# Patient Record
Sex: FEMALE | Race: WHITE | NOT HISPANIC OR LATINO
[De-identification: names, ages, dates, MRNs, and addresses within clinical notes are randomized per-mention and may not be internally consistent; named-entity substitution may affect disease eponyms.]

---

## 2015-05-01 RX ORDER — GABAPENTIN 400 MG/1
4 CAPSULE ORAL
Qty: 0 | Refills: 0 | DISCHARGE
Start: 2015-05-01

## 2017-01-23 ENCOUNTER — APPOINTMENT (OUTPATIENT)
Dept: FAMILY MEDICINE | Facility: CLINIC | Age: 67
End: 2017-01-23

## 2017-01-23 VITALS
DIASTOLIC BLOOD PRESSURE: 72 MMHG | WEIGHT: 178 LBS | HEART RATE: 66 BPM | HEIGHT: 61 IN | OXYGEN SATURATION: 96 % | SYSTOLIC BLOOD PRESSURE: 140 MMHG | BODY MASS INDEX: 33.61 KG/M2 | RESPIRATION RATE: 14 BRPM | TEMPERATURE: 98 F

## 2017-01-23 DIAGNOSIS — M25.562 PAIN IN LEFT KNEE: ICD-10-CM

## 2017-01-26 ENCOUNTER — NON-APPOINTMENT (OUTPATIENT)
Age: 67
End: 2017-01-26

## 2017-01-26 ENCOUNTER — APPOINTMENT (OUTPATIENT)
Dept: CARDIOLOGY | Facility: CLINIC | Age: 67
End: 2017-01-26

## 2017-01-26 VITALS
HEART RATE: 60 BPM | HEIGHT: 61 IN | OXYGEN SATURATION: 95 % | RESPIRATION RATE: 15 BRPM | WEIGHT: 185 LBS | DIASTOLIC BLOOD PRESSURE: 75 MMHG | SYSTOLIC BLOOD PRESSURE: 130 MMHG | BODY MASS INDEX: 34.93 KG/M2

## 2017-01-26 VITALS — SYSTOLIC BLOOD PRESSURE: 110 MMHG | HEART RATE: 60 BPM | DIASTOLIC BLOOD PRESSURE: 60 MMHG

## 2017-02-02 ENCOUNTER — RX RENEWAL (OUTPATIENT)
Age: 67
End: 2017-02-02

## 2017-02-14 ENCOUNTER — RX RENEWAL (OUTPATIENT)
Age: 67
End: 2017-02-14

## 2017-02-21 ENCOUNTER — MEDICATION RENEWAL (OUTPATIENT)
Age: 67
End: 2017-02-21

## 2017-02-22 ENCOUNTER — MEDICATION RENEWAL (OUTPATIENT)
Age: 67
End: 2017-02-22

## 2017-03-04 ENCOUNTER — RX RENEWAL (OUTPATIENT)
Age: 67
End: 2017-03-04

## 2017-03-08 ENCOUNTER — RX RENEWAL (OUTPATIENT)
Age: 67
End: 2017-03-08

## 2017-03-09 ENCOUNTER — APPOINTMENT (OUTPATIENT)
Dept: OBGYN | Facility: CLINIC | Age: 67
End: 2017-03-09

## 2017-03-09 VITALS
BODY MASS INDEX: 33.99 KG/M2 | RESPIRATION RATE: 16 BRPM | SYSTOLIC BLOOD PRESSURE: 112 MMHG | DIASTOLIC BLOOD PRESSURE: 72 MMHG | WEIGHT: 180 LBS | OXYGEN SATURATION: 96 % | HEIGHT: 61 IN | HEART RATE: 67 BPM

## 2017-03-09 DIAGNOSIS — Z12.31 ENCOUNTER FOR SCREENING MAMMOGRAM FOR MALIGNANT NEOPLASM OF BREAST: ICD-10-CM

## 2017-03-09 DIAGNOSIS — Z01.419 ENCOUNTER FOR GYNECOLOGICAL EXAMINATION (GENERAL) (ROUTINE) W/OUT ABNORMAL FINDINGS: ICD-10-CM

## 2017-03-09 DIAGNOSIS — Z87.891 PERSONAL HISTORY OF NICOTINE DEPENDENCE: ICD-10-CM

## 2017-03-09 DIAGNOSIS — Z87.59 PERSONAL HISTORY OF OTHER COMPLICATIONS OF PREGNANCY, CHILDBIRTH AND THE PUERPERIUM: ICD-10-CM

## 2017-03-09 DIAGNOSIS — Z13.820 ENCOUNTER FOR SCREENING FOR OSTEOPOROSIS: ICD-10-CM

## 2017-03-10 ENCOUNTER — APPOINTMENT (OUTPATIENT)
Dept: ORTHOPEDIC SURGERY | Facility: CLINIC | Age: 67
End: 2017-03-10

## 2017-03-13 LAB — HPV HIGH+LOW RISK DNA PNL CVX: NEGATIVE

## 2017-03-15 LAB — CYTOLOGY CVX/VAG DOC THIN PREP: NORMAL

## 2017-03-20 ENCOUNTER — RX RENEWAL (OUTPATIENT)
Age: 67
End: 2017-03-20

## 2017-03-20 ENCOUNTER — APPOINTMENT (OUTPATIENT)
Dept: ORTHOPEDIC SURGERY | Facility: CLINIC | Age: 67
End: 2017-03-20
Payer: MEDICAID

## 2017-03-20 ENCOUNTER — APPOINTMENT (OUTPATIENT)
Dept: FAMILY MEDICINE | Facility: CLINIC | Age: 67
End: 2017-03-20

## 2017-03-20 VITALS
SYSTOLIC BLOOD PRESSURE: 130 MMHG | TEMPERATURE: 98.3 F | DIASTOLIC BLOOD PRESSURE: 80 MMHG | OXYGEN SATURATION: 94 % | BODY MASS INDEX: 34.95 KG/M2 | HEIGHT: 60 IN | HEART RATE: 77 BPM | WEIGHT: 178 LBS

## 2017-03-20 PROCEDURE — 99214 OFFICE O/P EST MOD 30 MIN: CPT

## 2017-03-22 ENCOUNTER — APPOINTMENT (OUTPATIENT)
Dept: SURGERY | Facility: CLINIC | Age: 67
End: 2017-03-22

## 2017-03-22 VITALS — BODY MASS INDEX: 32.62 KG/M2 | HEIGHT: 61.5 IN | WEIGHT: 175 LBS

## 2017-03-22 DIAGNOSIS — Z86.79 PERSONAL HISTORY OF OTHER DISEASES OF THE CIRCULATORY SYSTEM: ICD-10-CM

## 2017-03-22 DIAGNOSIS — Z87.39 PERSONAL HISTORY OF OTHER DISEASES OF THE MUSCULOSKELETAL SYSTEM AND CONNECTIVE TISSUE: ICD-10-CM

## 2017-03-22 DIAGNOSIS — Z12.11 ENCOUNTER FOR SCREENING FOR MALIGNANT NEOPLASM OF COLON: ICD-10-CM

## 2017-03-27 ENCOUNTER — APPOINTMENT (OUTPATIENT)
Dept: MAMMOGRAPHY | Facility: HOSPITAL | Age: 67
End: 2017-03-27

## 2017-03-27 ENCOUNTER — APPOINTMENT (OUTPATIENT)
Dept: RADIOLOGY | Facility: HOSPITAL | Age: 67
End: 2017-03-27

## 2017-03-27 ENCOUNTER — OUTPATIENT (OUTPATIENT)
Dept: OUTPATIENT SERVICES | Facility: HOSPITAL | Age: 67
LOS: 1 days | End: 2017-03-27
Payer: MEDICAID

## 2017-03-27 DIAGNOSIS — Z96.653 PRESENCE OF ARTIFICIAL KNEE JOINT, BILATERAL: Chronic | ICD-10-CM

## 2017-03-27 DIAGNOSIS — Z98.89 OTHER SPECIFIED POSTPROCEDURAL STATES: Chronic | ICD-10-CM

## 2017-03-27 DIAGNOSIS — Z12.31 ENCOUNTER FOR SCREENING MAMMOGRAM FOR MALIGNANT NEOPLASM OF BREAST: ICD-10-CM

## 2017-03-27 DIAGNOSIS — M85.839 OTHER SPECIFIED DISORDERS OF BONE DENSITY AND STRUCTURE, UNSPECIFIED FOREARM: ICD-10-CM

## 2017-03-27 DIAGNOSIS — Z78.0 ASYMPTOMATIC MENOPAUSAL STATE: ICD-10-CM

## 2017-03-27 DIAGNOSIS — K43.2 INCISIONAL HERNIA WITHOUT OBSTRUCTION OR GANGRENE: Chronic | ICD-10-CM

## 2017-03-27 PROCEDURE — 77067 SCR MAMMO BI INCL CAD: CPT

## 2017-03-27 PROCEDURE — 77080 DXA BONE DENSITY AXIAL: CPT

## 2017-03-27 PROCEDURE — 77063 BREAST TOMOSYNTHESIS BI: CPT

## 2017-03-28 ENCOUNTER — TRANSCRIPTION ENCOUNTER (OUTPATIENT)
Age: 67
End: 2017-03-28

## 2017-03-28 ENCOUNTER — OUTPATIENT (OUTPATIENT)
Dept: OUTPATIENT SERVICES | Facility: HOSPITAL | Age: 67
LOS: 1 days | Discharge: HOME | End: 2017-03-28
Payer: MEDICAID

## 2017-03-28 DIAGNOSIS — Z98.89 OTHER SPECIFIED POSTPROCEDURAL STATES: Chronic | ICD-10-CM

## 2017-03-28 DIAGNOSIS — K43.2 INCISIONAL HERNIA WITHOUT OBSTRUCTION OR GANGRENE: Chronic | ICD-10-CM

## 2017-03-28 DIAGNOSIS — Z96.653 PRESENCE OF ARTIFICIAL KNEE JOINT, BILATERAL: Chronic | ICD-10-CM

## 2017-03-28 DIAGNOSIS — K57.30 DIVERTICULOSIS OF LARGE INTESTINE WITHOUT PERFORATION OR ABSCESS WITHOUT BLEEDING: ICD-10-CM

## 2017-03-28 DIAGNOSIS — Z12.11 ENCOUNTER FOR SCREENING FOR MALIGNANT NEOPLASM OF COLON: ICD-10-CM

## 2017-03-28 DIAGNOSIS — K59.00 CONSTIPATION, UNSPECIFIED: ICD-10-CM

## 2017-03-28 DIAGNOSIS — M10.9 GOUT, UNSPECIFIED: ICD-10-CM

## 2017-03-28 DIAGNOSIS — K62.5 HEMORRHAGE OF ANUS AND RECTUM: ICD-10-CM

## 2017-03-28 DIAGNOSIS — G89.29 OTHER CHRONIC PAIN: ICD-10-CM

## 2017-03-28 PROCEDURE — 45378 DIAGNOSTIC COLONOSCOPY: CPT | Mod: 33

## 2017-03-28 PROCEDURE — G0121: CPT

## 2017-03-31 ENCOUNTER — RX RENEWAL (OUTPATIENT)
Age: 67
End: 2017-03-31

## 2017-04-04 ENCOUNTER — RX RENEWAL (OUTPATIENT)
Age: 67
End: 2017-04-04

## 2017-04-07 ENCOUNTER — APPOINTMENT (OUTPATIENT)
Dept: FAMILY MEDICINE | Facility: CLINIC | Age: 67
End: 2017-04-07

## 2017-04-07 VITALS
HEIGHT: 61 IN | BODY MASS INDEX: 32.85 KG/M2 | DIASTOLIC BLOOD PRESSURE: 70 MMHG | SYSTOLIC BLOOD PRESSURE: 130 MMHG | RESPIRATION RATE: 16 BRPM | HEART RATE: 70 BPM | WEIGHT: 174 LBS | OXYGEN SATURATION: 96 %

## 2017-04-07 DIAGNOSIS — K12.1 OTHER FORMS OF STOMATITIS: ICD-10-CM

## 2017-04-10 ENCOUNTER — RX RENEWAL (OUTPATIENT)
Age: 67
End: 2017-04-10

## 2017-04-28 ENCOUNTER — RX RENEWAL (OUTPATIENT)
Age: 67
End: 2017-04-28

## 2017-05-03 ENCOUNTER — FORM ENCOUNTER (OUTPATIENT)
Age: 67
End: 2017-05-03

## 2017-05-03 ENCOUNTER — APPOINTMENT (OUTPATIENT)
Dept: FAMILY MEDICINE | Facility: CLINIC | Age: 67
End: 2017-05-03

## 2017-05-03 VITALS
TEMPERATURE: 97.9 F | HEART RATE: 52 BPM | BODY MASS INDEX: 32.28 KG/M2 | DIASTOLIC BLOOD PRESSURE: 58 MMHG | HEIGHT: 61 IN | WEIGHT: 171 LBS | SYSTOLIC BLOOD PRESSURE: 110 MMHG | OXYGEN SATURATION: 94 %

## 2017-05-03 VITALS — RESPIRATION RATE: 16 BRPM

## 2017-05-03 DIAGNOSIS — Z78.9 OTHER SPECIFIED HEALTH STATUS: ICD-10-CM

## 2017-05-04 ENCOUNTER — OUTPATIENT (OUTPATIENT)
Dept: OUTPATIENT SERVICES | Facility: HOSPITAL | Age: 67
LOS: 1 days | End: 2017-05-04
Payer: MEDICARE

## 2017-05-04 ENCOUNTER — APPOINTMENT (OUTPATIENT)
Dept: CT IMAGING | Facility: HOSPITAL | Age: 67
End: 2017-05-04

## 2017-05-04 DIAGNOSIS — K57.30 DIVERTICULOSIS OF LARGE INTESTINE WITHOUT PERFORATION OR ABSCESS WITHOUT BLEEDING: ICD-10-CM

## 2017-05-04 DIAGNOSIS — K43.2 INCISIONAL HERNIA WITHOUT OBSTRUCTION OR GANGRENE: Chronic | ICD-10-CM

## 2017-05-04 DIAGNOSIS — Z98.89 OTHER SPECIFIED POSTPROCEDURAL STATES: Chronic | ICD-10-CM

## 2017-05-04 DIAGNOSIS — N28.1 CYST OF KIDNEY, ACQUIRED: ICD-10-CM

## 2017-05-04 DIAGNOSIS — Z96.653 PRESENCE OF ARTIFICIAL KNEE JOINT, BILATERAL: Chronic | ICD-10-CM

## 2017-05-04 PROCEDURE — 74176 CT ABD & PELVIS W/O CONTRAST: CPT

## 2017-05-23 ENCOUNTER — FORM ENCOUNTER (OUTPATIENT)
Age: 67
End: 2017-05-23

## 2017-05-24 ENCOUNTER — APPOINTMENT (OUTPATIENT)
Dept: MRI IMAGING | Facility: HOSPITAL | Age: 67
End: 2017-05-24

## 2017-05-24 ENCOUNTER — OUTPATIENT (OUTPATIENT)
Dept: OUTPATIENT SERVICES | Facility: HOSPITAL | Age: 67
LOS: 1 days | End: 2017-05-24
Payer: MEDICARE

## 2017-05-24 DIAGNOSIS — Z96.653 PRESENCE OF ARTIFICIAL KNEE JOINT, BILATERAL: Chronic | ICD-10-CM

## 2017-05-24 DIAGNOSIS — Z98.89 OTHER SPECIFIED POSTPROCEDURAL STATES: Chronic | ICD-10-CM

## 2017-05-24 DIAGNOSIS — M47.814 SPONDYLOSIS WITHOUT MYELOPATHY OR RADICULOPATHY, THORACIC REGION: ICD-10-CM

## 2017-05-24 DIAGNOSIS — K43.2 INCISIONAL HERNIA WITHOUT OBSTRUCTION OR GANGRENE: Chronic | ICD-10-CM

## 2017-05-24 PROCEDURE — 72146 MRI CHEST SPINE W/O DYE: CPT

## 2017-05-31 ENCOUNTER — APPOINTMENT (OUTPATIENT)
Dept: FAMILY MEDICINE | Facility: CLINIC | Age: 67
End: 2017-05-31

## 2017-05-31 VITALS
DIASTOLIC BLOOD PRESSURE: 66 MMHG | BODY MASS INDEX: 32.47 KG/M2 | TEMPERATURE: 98.6 F | OXYGEN SATURATION: 94 % | HEART RATE: 56 BPM | WEIGHT: 172 LBS | HEIGHT: 61 IN | SYSTOLIC BLOOD PRESSURE: 119 MMHG

## 2017-05-31 VITALS — RESPIRATION RATE: 16 BRPM

## 2017-06-01 ENCOUNTER — LABORATORY RESULT (OUTPATIENT)
Age: 67
End: 2017-06-01

## 2017-06-02 LAB
ALBUMIN SERPL ELPH-MCNC: 4.1 G/DL
ALP BLD-CCNC: 65 U/L
ALT SERPL-CCNC: 11 U/L
ANION GAP SERPL CALC-SCNC: 13 MMOL/L
AST SERPL-CCNC: 20 U/L
BASOPHILS # BLD AUTO: 0.1 K/UL
BASOPHILS NFR BLD AUTO: 2.3 %
BILIRUB SERPL-MCNC: 0.3 MG/DL
BUN SERPL-MCNC: 21 MG/DL
CALCIUM SERPL-MCNC: 9.1 MG/DL
CHLORIDE SERPL-SCNC: 105 MMOL/L
CHOLEST SERPL-MCNC: 148 MG/DL
CHOLEST/HDLC SERPL: 2.6 RATIO
CK SERPL-CCNC: 107 U/L
CO2 SERPL-SCNC: 26 MMOL/L
CREAT SERPL-MCNC: 1.1 MG/DL
EOSINOPHIL # BLD AUTO: 0.28 K/UL
EOSINOPHIL NFR BLD AUTO: 6.3 %
GLUCOSE SERPL-MCNC: 83 MG/DL
HBA1C MFR BLD HPLC: 5.8 %
HCT VFR BLD CALC: 34.4 %
HDLC SERPL-MCNC: 58 MG/DL
HGB BLD-MCNC: 10.3 G/DL
IMM GRANULOCYTES NFR BLD AUTO: 0.2 %
LDLC SERPL CALC-MCNC: 74 MG/DL
LYMPHOCYTES # BLD AUTO: 2.08 K/UL
LYMPHOCYTES NFR BLD AUTO: 47 %
MAN DIFF?: NORMAL
MCHC RBC-ENTMCNC: 28.9 PG
MCHC RBC-ENTMCNC: 29.9 GM/DL
MCV RBC AUTO: 96.6 FL
MONOCYTES # BLD AUTO: 0.49 K/UL
MONOCYTES NFR BLD AUTO: 11.1 %
NEUTROPHILS # BLD AUTO: 1.47 K/UL
NEUTROPHILS NFR BLD AUTO: 33.1 %
PLATELET # BLD AUTO: 186 K/UL
POTASSIUM SERPL-SCNC: 4.4 MMOL/L
PROT SERPL-MCNC: 6.8 G/DL
RBC # BLD: 3.56 M/UL
RBC # FLD: 14.4 %
SODIUM SERPL-SCNC: 144 MMOL/L
T4 FREE SERPL-MCNC: 1.5 NG/DL
TRIGL SERPL-MCNC: 78 MG/DL
TSH SERPL-ACNC: 0.97 UIU/ML
WBC # FLD AUTO: 4.43 K/UL

## 2017-06-08 ENCOUNTER — RX RENEWAL (OUTPATIENT)
Age: 67
End: 2017-06-08

## 2017-06-13 ENCOUNTER — RX RENEWAL (OUTPATIENT)
Age: 67
End: 2017-06-13

## 2017-07-05 ENCOUNTER — RX RENEWAL (OUTPATIENT)
Age: 67
End: 2017-07-05

## 2017-07-07 ENCOUNTER — RX RENEWAL (OUTPATIENT)
Age: 67
End: 2017-07-07

## 2017-07-14 ENCOUNTER — RX RENEWAL (OUTPATIENT)
Age: 67
End: 2017-07-14

## 2017-07-20 ENCOUNTER — APPOINTMENT (OUTPATIENT)
Dept: CARDIOLOGY | Facility: CLINIC | Age: 67
End: 2017-07-20

## 2017-07-24 ENCOUNTER — RX RENEWAL (OUTPATIENT)
Age: 67
End: 2017-07-24

## 2017-07-26 ENCOUNTER — APPOINTMENT (OUTPATIENT)
Dept: FAMILY MEDICINE | Facility: CLINIC | Age: 67
End: 2017-07-26

## 2017-07-26 VITALS
WEIGHT: 171 LBS | OXYGEN SATURATION: 95 % | HEIGHT: 61 IN | HEART RATE: 54 BPM | TEMPERATURE: 98.1 F | SYSTOLIC BLOOD PRESSURE: 123 MMHG | DIASTOLIC BLOOD PRESSURE: 75 MMHG | BODY MASS INDEX: 32.28 KG/M2

## 2017-07-26 VITALS — RESPIRATION RATE: 14 BRPM

## 2017-08-02 ENCOUNTER — RX RENEWAL (OUTPATIENT)
Age: 67
End: 2017-08-02

## 2017-08-30 ENCOUNTER — APPOINTMENT (OUTPATIENT)
Dept: FAMILY MEDICINE | Facility: CLINIC | Age: 67
End: 2017-08-30
Payer: MEDICARE

## 2017-08-30 ENCOUNTER — APPOINTMENT (OUTPATIENT)
Dept: SURGERY | Facility: CLINIC | Age: 67
End: 2017-08-30
Payer: MEDICARE

## 2017-08-30 VITALS
TEMPERATURE: 98 F | OXYGEN SATURATION: 96 % | HEART RATE: 62 BPM | DIASTOLIC BLOOD PRESSURE: 60 MMHG | SYSTOLIC BLOOD PRESSURE: 97 MMHG

## 2017-08-30 VITALS — RESPIRATION RATE: 16 BRPM

## 2017-08-30 DIAGNOSIS — Z86.711 PERSONAL HISTORY OF PULMONARY EMBOLISM: ICD-10-CM

## 2017-08-30 PROCEDURE — 99214 OFFICE O/P EST MOD 30 MIN: CPT

## 2017-08-31 ENCOUNTER — MEDICATION RENEWAL (OUTPATIENT)
Age: 67
End: 2017-08-31

## 2017-08-31 RX ORDER — AZITHROMYCIN 250 MG/1
250 TABLET, FILM COATED ORAL
Qty: 6 | Refills: 1 | Status: DISCONTINUED | COMMUNITY
Start: 2017-01-23 | End: 2017-08-31

## 2017-09-11 ENCOUNTER — RX RENEWAL (OUTPATIENT)
Age: 67
End: 2017-09-11

## 2017-09-14 ENCOUNTER — RX RENEWAL (OUTPATIENT)
Age: 67
End: 2017-09-14

## 2017-09-21 ENCOUNTER — APPOINTMENT (OUTPATIENT)
Dept: FAMILY MEDICINE | Facility: CLINIC | Age: 67
End: 2017-09-21
Payer: MEDICARE

## 2017-09-21 VITALS
WEIGHT: 174 LBS | TEMPERATURE: 98 F | HEIGHT: 61 IN | DIASTOLIC BLOOD PRESSURE: 70 MMHG | RESPIRATION RATE: 14 BRPM | HEART RATE: 66 BPM | BODY MASS INDEX: 32.85 KG/M2 | OXYGEN SATURATION: 98 % | SYSTOLIC BLOOD PRESSURE: 120 MMHG

## 2017-09-21 DIAGNOSIS — H60.509 UNSPECIFIED ACUTE NONINFECTIVE OTITIS EXTERNA, UNSPECIFIED EAR: ICD-10-CM

## 2017-09-21 PROCEDURE — 99213 OFFICE O/P EST LOW 20 MIN: CPT

## 2017-09-28 ENCOUNTER — APPOINTMENT (OUTPATIENT)
Dept: PHYSICAL MEDICINE AND REHAB | Facility: CLINIC | Age: 67
End: 2017-09-28
Payer: MEDICARE

## 2017-09-28 VITALS
BODY MASS INDEX: 32.85 KG/M2 | DIASTOLIC BLOOD PRESSURE: 76 MMHG | HEART RATE: 55 BPM | WEIGHT: 174 LBS | HEIGHT: 61 IN | SYSTOLIC BLOOD PRESSURE: 157 MMHG

## 2017-09-28 PROCEDURE — 99214 OFFICE O/P EST MOD 30 MIN: CPT

## 2017-10-02 ENCOUNTER — APPOINTMENT (OUTPATIENT)
Dept: FAMILY MEDICINE | Facility: CLINIC | Age: 67
End: 2017-10-02
Payer: MEDICARE

## 2017-10-02 VITALS
HEART RATE: 57 BPM | RESPIRATION RATE: 14 BRPM | OXYGEN SATURATION: 96 % | WEIGHT: 180 LBS | SYSTOLIC BLOOD PRESSURE: 120 MMHG | DIASTOLIC BLOOD PRESSURE: 70 MMHG | TEMPERATURE: 98 F | HEIGHT: 61 IN | BODY MASS INDEX: 33.99 KG/M2

## 2017-10-02 PROCEDURE — 99214 OFFICE O/P EST MOD 30 MIN: CPT | Mod: 25

## 2017-10-02 PROCEDURE — G0008: CPT

## 2017-10-02 PROCEDURE — 90688 IIV4 VACCINE SPLT 0.5 ML IM: CPT

## 2017-10-09 ENCOUNTER — RX RENEWAL (OUTPATIENT)
Age: 67
End: 2017-10-09

## 2017-10-17 ENCOUNTER — APPOINTMENT (OUTPATIENT)
Dept: PHYSICAL MEDICINE AND REHAB | Facility: CLINIC | Age: 67
End: 2017-10-17
Payer: MEDICARE

## 2017-10-17 ENCOUNTER — OUTPATIENT (OUTPATIENT)
Dept: OUTPATIENT SERVICES | Facility: HOSPITAL | Age: 67
LOS: 1 days | End: 2017-10-17
Payer: MEDICARE

## 2017-10-17 DIAGNOSIS — M54.14 RADICULOPATHY, THORACIC REGION: ICD-10-CM

## 2017-10-17 DIAGNOSIS — Z96.653 PRESENCE OF ARTIFICIAL KNEE JOINT, BILATERAL: Chronic | ICD-10-CM

## 2017-10-17 DIAGNOSIS — Z98.89 OTHER SPECIFIED POSTPROCEDURAL STATES: Chronic | ICD-10-CM

## 2017-10-17 DIAGNOSIS — K43.2 INCISIONAL HERNIA WITHOUT OBSTRUCTION OR GANGRENE: Chronic | ICD-10-CM

## 2017-10-17 PROCEDURE — 64484 NJX AA&/STRD TFRM EPI L/S EA: CPT

## 2017-10-17 PROCEDURE — 64483 NJX AA&/STRD TFRM EPI L/S 1: CPT

## 2017-10-20 DIAGNOSIS — M54.15 RADICULOPATHY, THORACOLUMBAR REGION: ICD-10-CM

## 2017-11-16 ENCOUNTER — MEDICATION RENEWAL (OUTPATIENT)
Age: 67
End: 2017-11-16

## 2017-11-29 ENCOUNTER — RX RENEWAL (OUTPATIENT)
Age: 67
End: 2017-11-29

## 2017-12-06 ENCOUNTER — APPOINTMENT (OUTPATIENT)
Dept: FAMILY MEDICINE | Facility: CLINIC | Age: 67
End: 2017-12-06

## 2017-12-13 ENCOUNTER — APPOINTMENT (OUTPATIENT)
Dept: FAMILY MEDICINE | Facility: CLINIC | Age: 67
End: 2017-12-13
Payer: MEDICARE

## 2017-12-13 VITALS
TEMPERATURE: 98.2 F | HEIGHT: 61 IN | SYSTOLIC BLOOD PRESSURE: 118 MMHG | DIASTOLIC BLOOD PRESSURE: 70 MMHG | RESPIRATION RATE: 14 BRPM | BODY MASS INDEX: 32.66 KG/M2 | WEIGHT: 173 LBS | HEART RATE: 62 BPM | OXYGEN SATURATION: 96 %

## 2017-12-13 DIAGNOSIS — S80.02XA CONTUSION OF LEFT KNEE, INITIAL ENCOUNTER: ICD-10-CM

## 2017-12-13 DIAGNOSIS — Z87.39 PERSONAL HISTORY OF OTHER DISEASES OF THE MUSCULOSKELETAL SYSTEM AND CONNECTIVE TISSUE: ICD-10-CM

## 2017-12-13 DIAGNOSIS — S20.00XA CONTUSION OF BREAST, UNSPECIFIED BREAST, INITIAL ENCOUNTER: ICD-10-CM

## 2017-12-13 DIAGNOSIS — Z87.898 PERSONAL HISTORY OF OTHER SPECIFIED CONDITIONS: ICD-10-CM

## 2017-12-13 PROCEDURE — 90732 PPSV23 VACC 2 YRS+ SUBQ/IM: CPT

## 2017-12-13 PROCEDURE — 99214 OFFICE O/P EST MOD 30 MIN: CPT | Mod: 25

## 2017-12-13 PROCEDURE — G0009: CPT

## 2017-12-13 RX ORDER — IBUPROFEN 800 MG/1
800 TABLET, FILM COATED ORAL
Qty: 20 | Refills: 0 | Status: COMPLETED | COMMUNITY
Start: 2017-09-29

## 2017-12-13 RX ORDER — POLYETHYLENE GLYCOL 3350, SODIUM SULFATE ANHYDROUS, SODIUM BICARBONATE, SODIUM CHLORIDE, POTASSIUM CHLORIDE 227.1; 21.5; 6.36; 5.53; .754 G/L; G/L; G/L; G/L; G/L
227.1 POWDER, FOR SOLUTION ORAL
Qty: 1 | Refills: 0 | Status: COMPLETED | COMMUNITY
Start: 2017-03-25 | End: 2017-12-13

## 2017-12-13 RX ORDER — CIPROFLOXACIN HYDROCHLORIDE AND HYDROCORTISONE 2; 10 MG/ML; MG/ML
0.2-1 SUSPENSION/ DROPS AURICULAR (OTIC) TWICE DAILY
Qty: 10 | Refills: 1 | Status: COMPLETED | COMMUNITY
Start: 2017-09-21 | End: 2017-12-13

## 2017-12-13 RX ORDER — CIPROFLOXACIN 3 MG/ML
0.3 SOLUTION OPHTHALMIC
Qty: 5 | Refills: 0 | Status: COMPLETED | COMMUNITY
Start: 2017-10-16

## 2017-12-13 RX ORDER — CHLORHEXIDINE GLUCONATE, 0.12% ORAL RINSE 1.2 MG/ML
0.12 SOLUTION DENTAL
Qty: 473 | Refills: 0 | Status: COMPLETED | COMMUNITY
Start: 2017-09-29

## 2017-12-13 RX ORDER — AZITHROMYCIN 250 MG/1
250 TABLET, FILM COATED ORAL
Qty: 6 | Refills: 1 | Status: COMPLETED | COMMUNITY
Start: 2017-08-30 | End: 2017-12-13

## 2017-12-24 ENCOUNTER — RX RENEWAL (OUTPATIENT)
Age: 67
End: 2017-12-24

## 2018-01-17 ENCOUNTER — RX RENEWAL (OUTPATIENT)
Age: 68
End: 2018-01-17

## 2018-02-07 ENCOUNTER — APPOINTMENT (OUTPATIENT)
Dept: FAMILY MEDICINE | Facility: CLINIC | Age: 68
End: 2018-02-07

## 2018-02-20 ENCOUNTER — RX RENEWAL (OUTPATIENT)
Age: 68
End: 2018-02-20

## 2018-02-21 ENCOUNTER — RX RENEWAL (OUTPATIENT)
Age: 68
End: 2018-02-21

## 2018-02-24 ENCOUNTER — APPOINTMENT (OUTPATIENT)
Dept: MRI IMAGING | Facility: HOSPITAL | Age: 68
End: 2018-02-24
Payer: MEDICARE

## 2018-02-24 ENCOUNTER — OUTPATIENT (OUTPATIENT)
Dept: OUTPATIENT SERVICES | Facility: HOSPITAL | Age: 68
LOS: 1 days | End: 2018-02-24
Payer: MEDICARE

## 2018-02-24 DIAGNOSIS — M41.86 OTHER FORMS OF SCOLIOSIS, LUMBAR REGION: ICD-10-CM

## 2018-02-24 DIAGNOSIS — Z96.653 PRESENCE OF ARTIFICIAL KNEE JOINT, BILATERAL: Chronic | ICD-10-CM

## 2018-02-24 DIAGNOSIS — Z98.89 OTHER SPECIFIED POSTPROCEDURAL STATES: Chronic | ICD-10-CM

## 2018-02-24 DIAGNOSIS — M47.816 SPONDYLOSIS WITHOUT MYELOPATHY OR RADICULOPATHY, LUMBAR REGION: ICD-10-CM

## 2018-02-24 DIAGNOSIS — M51.36 OTHER INTERVERTEBRAL DISC DEGENERATION, LUMBAR REGION: ICD-10-CM

## 2018-02-24 DIAGNOSIS — Z00.8 ENCOUNTER FOR OTHER GENERAL EXAMINATION: ICD-10-CM

## 2018-02-24 DIAGNOSIS — K43.2 INCISIONAL HERNIA WITHOUT OBSTRUCTION OR GANGRENE: Chronic | ICD-10-CM

## 2018-02-24 PROCEDURE — 72158 MRI LUMBAR SPINE W/O & W/DYE: CPT

## 2018-02-24 PROCEDURE — A9579: CPT

## 2018-02-24 PROCEDURE — 72158 MRI LUMBAR SPINE W/O & W/DYE: CPT | Mod: 26

## 2018-02-26 ENCOUNTER — APPOINTMENT (OUTPATIENT)
Dept: FAMILY MEDICINE | Facility: CLINIC | Age: 68
End: 2018-02-26
Payer: MEDICARE

## 2018-02-26 VITALS — RESPIRATION RATE: 14 BRPM

## 2018-02-26 VITALS
BODY MASS INDEX: 32.28 KG/M2 | DIASTOLIC BLOOD PRESSURE: 68 MMHG | SYSTOLIC BLOOD PRESSURE: 110 MMHG | WEIGHT: 171 LBS | OXYGEN SATURATION: 93 % | HEIGHT: 61 IN | HEART RATE: 59 BPM

## 2018-02-26 PROCEDURE — 99213 OFFICE O/P EST LOW 20 MIN: CPT

## 2018-02-26 RX ORDER — METRONIDAZOLE 500 MG/1
500 TABLET ORAL
Qty: 14 | Refills: 0 | Status: COMPLETED | COMMUNITY
Start: 2018-02-06

## 2018-02-26 RX ORDER — DICYCLOMINE HYDROCHLORIDE 20 MG/1
20 TABLET ORAL
Qty: 20 | Refills: 0 | Status: COMPLETED | COMMUNITY
Start: 2018-02-06

## 2018-02-28 ENCOUNTER — EMERGENCY (EMERGENCY)
Facility: HOSPITAL | Age: 68
LOS: 1 days | Discharge: HOME | End: 2018-02-28
Admitting: EMERGENCY MEDICINE
Payer: MEDICARE

## 2018-02-28 DIAGNOSIS — K52.9 NONINFECTIVE GASTROENTERITIS AND COLITIS, UNSPECIFIED: ICD-10-CM

## 2018-02-28 DIAGNOSIS — I10 ESSENTIAL (PRIMARY) HYPERTENSION: ICD-10-CM

## 2018-02-28 DIAGNOSIS — M75.52 BURSITIS OF LEFT SHOULDER: ICD-10-CM

## 2018-02-28 DIAGNOSIS — R10.32 LEFT LOWER QUADRANT PAIN: ICD-10-CM

## 2018-02-28 DIAGNOSIS — Z96.653 PRESENCE OF ARTIFICIAL KNEE JOINT, BILATERAL: Chronic | ICD-10-CM

## 2018-02-28 DIAGNOSIS — K21.9 GASTRO-ESOPHAGEAL REFLUX DISEASE WITHOUT ESOPHAGITIS: ICD-10-CM

## 2018-02-28 DIAGNOSIS — Z88.8 ALLERGY STATUS TO OTHER DRUGS, MEDICAMENTS AND BIOLOGICAL SUBSTANCES: ICD-10-CM

## 2018-02-28 DIAGNOSIS — R65.10 SYSTEMIC INFLAMMATORY RESPONSE SYNDROME (SIRS) OF NON-INFECTIOUS ORIGIN WITHOUT ACUTE ORGAN DYSFUNCTION: ICD-10-CM

## 2018-02-28 DIAGNOSIS — E03.9 HYPOTHYROIDISM, UNSPECIFIED: ICD-10-CM

## 2018-02-28 DIAGNOSIS — Z98.89 OTHER SPECIFIED POSTPROCEDURAL STATES: Chronic | ICD-10-CM

## 2018-02-28 DIAGNOSIS — Z98.890 OTHER SPECIFIED POSTPROCEDURAL STATES: ICD-10-CM

## 2018-02-28 DIAGNOSIS — Z87.39 PERSONAL HISTORY OF OTHER DISEASES OF THE MUSCULOSKELETAL SYSTEM AND CONNECTIVE TISSUE: ICD-10-CM

## 2018-02-28 DIAGNOSIS — K43.2 INCISIONAL HERNIA WITHOUT OBSTRUCTION OR GANGRENE: Chronic | ICD-10-CM

## 2018-02-28 DIAGNOSIS — Z87.891 PERSONAL HISTORY OF NICOTINE DEPENDENCE: ICD-10-CM

## 2018-02-28 PROCEDURE — 93010 ELECTROCARDIOGRAM REPORT: CPT

## 2018-02-28 PROCEDURE — 74177 CT ABD & PELVIS W/CONTRAST: CPT | Mod: 26

## 2018-02-28 PROCEDURE — 71045 X-RAY EXAM CHEST 1 VIEW: CPT | Mod: 26

## 2018-02-28 PROCEDURE — 73030 X-RAY EXAM OF SHOULDER: CPT | Mod: 26,LT

## 2018-02-28 PROCEDURE — 99285 EMERGENCY DEPT VISIT HI MDM: CPT

## 2018-03-01 PROCEDURE — 96365 THER/PROPH/DIAG IV INF INIT: CPT | Mod: XU

## 2018-03-01 PROCEDURE — 96367 TX/PROPH/DG ADDL SEQ IV INF: CPT

## 2018-03-01 PROCEDURE — 85610 PROTHROMBIN TIME: CPT

## 2018-03-01 PROCEDURE — 85027 COMPLETE CBC AUTOMATED: CPT

## 2018-03-01 PROCEDURE — 73030 X-RAY EXAM OF SHOULDER: CPT

## 2018-03-01 PROCEDURE — 93005 ELECTROCARDIOGRAM TRACING: CPT

## 2018-03-01 PROCEDURE — 83605 ASSAY OF LACTIC ACID: CPT

## 2018-03-01 PROCEDURE — 87086 URINE CULTURE/COLONY COUNT: CPT

## 2018-03-01 PROCEDURE — 80053 COMPREHEN METABOLIC PANEL: CPT

## 2018-03-01 PROCEDURE — 87040 BLOOD CULTURE FOR BACTERIA: CPT

## 2018-03-01 PROCEDURE — 83690 ASSAY OF LIPASE: CPT

## 2018-03-01 PROCEDURE — 96375 TX/PRO/DX INJ NEW DRUG ADDON: CPT

## 2018-03-01 PROCEDURE — 74177 CT ABD & PELVIS W/CONTRAST: CPT

## 2018-03-01 PROCEDURE — 81003 URINALYSIS AUTO W/O SCOPE: CPT

## 2018-03-01 PROCEDURE — 85730 THROMBOPLASTIN TIME PARTIAL: CPT

## 2018-03-01 PROCEDURE — 82550 ASSAY OF CK (CPK): CPT

## 2018-03-01 PROCEDURE — 99284 EMERGENCY DEPT VISIT MOD MDM: CPT | Mod: 25

## 2018-03-01 PROCEDURE — 71045 X-RAY EXAM CHEST 1 VIEW: CPT

## 2018-03-16 ENCOUNTER — APPOINTMENT (OUTPATIENT)
Dept: FAMILY MEDICINE | Facility: CLINIC | Age: 68
End: 2018-03-16
Payer: MEDICARE

## 2018-03-16 VITALS
WEIGHT: 170 LBS | TEMPERATURE: 98 F | SYSTOLIC BLOOD PRESSURE: 120 MMHG | DIASTOLIC BLOOD PRESSURE: 70 MMHG | RESPIRATION RATE: 14 BRPM | HEIGHT: 61 IN | OXYGEN SATURATION: 96 % | HEART RATE: 66 BPM | BODY MASS INDEX: 32.1 KG/M2

## 2018-03-16 DIAGNOSIS — T30.0 BURN OF UNSPECIFIED BODY REGION, UNSPECIFIED DEGREE: ICD-10-CM

## 2018-03-16 DIAGNOSIS — K52.9 NONINFECTIVE GASTROENTERITIS AND COLITIS, UNSPECIFIED: ICD-10-CM

## 2018-03-16 PROCEDURE — 99215 OFFICE O/P EST HI 40 MIN: CPT | Mod: 25

## 2018-04-18 ENCOUNTER — RX RENEWAL (OUTPATIENT)
Age: 68
End: 2018-04-18

## 2018-07-06 ENCOUNTER — RX RENEWAL (OUTPATIENT)
Age: 68
End: 2018-07-06

## 2018-07-09 ENCOUNTER — RX RENEWAL (OUTPATIENT)
Age: 68
End: 2018-07-09

## 2018-07-10 ENCOUNTER — APPOINTMENT (OUTPATIENT)
Dept: FAMILY MEDICINE | Facility: CLINIC | Age: 68
End: 2018-07-10

## 2018-07-11 ENCOUNTER — APPOINTMENT (OUTPATIENT)
Dept: PHYSICAL MEDICINE AND REHAB | Facility: CLINIC | Age: 68
End: 2018-07-11
Payer: MEDICARE

## 2018-07-11 VITALS
DIASTOLIC BLOOD PRESSURE: 67 MMHG | TEMPERATURE: 98 F | HEART RATE: 61 BPM | SYSTOLIC BLOOD PRESSURE: 102 MMHG | OXYGEN SATURATION: 95 %

## 2018-07-11 PROCEDURE — 99213 OFFICE O/P EST LOW 20 MIN: CPT

## 2018-07-12 ENCOUNTER — APPOINTMENT (OUTPATIENT)
Dept: FAMILY MEDICINE | Facility: CLINIC | Age: 68
End: 2018-07-12
Payer: MEDICARE

## 2018-07-12 VITALS
BODY MASS INDEX: 32.1 KG/M2 | HEART RATE: 66 BPM | TEMPERATURE: 98.7 F | DIASTOLIC BLOOD PRESSURE: 60 MMHG | SYSTOLIC BLOOD PRESSURE: 110 MMHG | OXYGEN SATURATION: 94 % | WEIGHT: 170 LBS | HEIGHT: 61 IN

## 2018-07-12 VITALS — RESPIRATION RATE: 14 BRPM

## 2018-07-12 VITALS — RESPIRATION RATE: 16 BRPM

## 2018-07-12 PROCEDURE — 99214 OFFICE O/P EST MOD 30 MIN: CPT

## 2018-07-12 RX ORDER — CIPROFLOXACIN HYDROCHLORIDE 500 MG/1
500 TABLET, FILM COATED ORAL
Qty: 20 | Refills: 0 | Status: COMPLETED | COMMUNITY
Start: 2018-02-28

## 2018-07-12 NOTE — HISTORY OF PRESENT ILLNESS
[Hyperlipidemia] : Hyperlipidemia [Hypertension] : Hypertension [Obesity] : Obesity [Other: ___] : [unfilled] [Checks BP Sporadically] : The patient checks ~his/her~ blood pressure sporadically [<130/90] : Target blood pressure is  <130/90 [Target goal met] : BP target goal met [Doing Well] : Patient is doing well [Managed with medications] : managed with  medication [Weight Loss ___ Pounds] : Weight loss of [unfilled] pounds [___ # of attempts] : [unfilled] weight lost attempts [Following Diet Successfully] : Following the diet successfully [Sedentary] : Patient is sedentary [Following  treatment as advised] : Patient is following  treatment as advised [Action] : Action: patient is following a plan to lose weight [Good understanding] : Patient has a good understanding of disease, goals and obesity follow-up plan [FreeTextEntry6] : Patient is a 68-year-old female, who presents today for disease management. Patient's chief complaint right-sided flank mass that has been increasing in size since last year. Patient had CT scan of abdomen, which was basically unremarkable. The CAT scan was done in May of 2017. She was also seen by surgery. Presently, the flank mass has increased in size, firm to touch tender with deep palpation. Patient says that the abdominal pain decreases with rest and increases as the day progresses. Medical history is significant for hypertension, hyperlipidemia, and elevated BMI. Patient has been following a weight loss program since October has lost 10 pounds.
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
## 2018-07-12 NOTE — REVIEW OF SYSTEMS
[Abdominal Pain] : abdominal pain [Joint Pain] : joint pain [Joint Stiffness] : joint stiffness [Back Pain] : back pain [Negative] : Heme/Lymph [Nausea] : no nausea [Constipation] : no constipation [Diarrhea] : diarrhea [Vomiting] : no vomiting [Heartburn] : no heartburn [Melena] : no melena [Joint Swelling] : no joint swelling [Muscle Weakness] : no muscle weakness [Muscle Pain] : no muscle pain

## 2018-07-12 NOTE — HEALTH RISK ASSESSMENT
[No falls in past year] : Patient reported no falls in the past year [0] : 2) Feeling down, depressed, or hopeless: Not at all (0) [] : No [de-identified] : Social [[id number]] : 0

## 2018-07-12 NOTE — COUNSELING
[Weight management counseling provided] : Weight management [Healthy eating counseling provided] : healthy eating [Activity counseling provided] : activity [Disease Management counseling provided] : disease management  [Behavioral health counseling provided] : behavioral health  [Take medications as directed] : Take medications as directed [Sleep ___ hours/day] : Sleep [unfilled] hours/day [Engage in a relaxing activity] : Engage in a relaxing activity [Plan in advance] : Plan in advance

## 2018-07-12 NOTE — ASSESSMENT
[FreeTextEntry1] : Assessment and plan:\par \par Left flank/abdominal mass. Obtain MRI with and without contrast.\par \par Continue present medical management would not change detailed discussion with patient regarding weight loss program. Chronic pain syndrome. Unfortunately, her physiatry as that is not available. I will give one-week supply of Percocet.

## 2018-07-15 ENCOUNTER — FORM ENCOUNTER (OUTPATIENT)
Age: 68
End: 2018-07-15

## 2018-07-16 ENCOUNTER — OUTPATIENT (OUTPATIENT)
Dept: OUTPATIENT SERVICES | Facility: HOSPITAL | Age: 68
LOS: 1 days | End: 2018-07-16
Payer: MEDICARE

## 2018-07-16 ENCOUNTER — APPOINTMENT (OUTPATIENT)
Dept: ULTRASOUND IMAGING | Facility: HOSPITAL | Age: 68
End: 2018-07-16
Payer: MEDICARE

## 2018-07-16 ENCOUNTER — OUTPATIENT (OUTPATIENT)
Dept: OUTPATIENT SERVICES | Facility: HOSPITAL | Age: 68
LOS: 1 days | End: 2018-07-16

## 2018-07-16 ENCOUNTER — APPOINTMENT (OUTPATIENT)
Dept: MRI IMAGING | Facility: HOSPITAL | Age: 68
End: 2018-07-16
Payer: MEDICARE

## 2018-07-16 DIAGNOSIS — Z98.89 OTHER SPECIFIED POSTPROCEDURAL STATES: Chronic | ICD-10-CM

## 2018-07-16 DIAGNOSIS — R19.00 INTRA-ABDOMINAL AND PELVIC SWELLING, MASS AND LUMP, UNSPECIFIED SITE: ICD-10-CM

## 2018-07-16 DIAGNOSIS — K43.2 INCISIONAL HERNIA WITHOUT OBSTRUCTION OR GANGRENE: Chronic | ICD-10-CM

## 2018-07-16 DIAGNOSIS — Z00.8 ENCOUNTER FOR OTHER GENERAL EXAMINATION: ICD-10-CM

## 2018-07-16 DIAGNOSIS — Z96.653 PRESENCE OF ARTIFICIAL KNEE JOINT, BILATERAL: Chronic | ICD-10-CM

## 2018-07-16 PROCEDURE — 74183 MRI ABD W/O CNTR FLWD CNTR: CPT | Mod: 26

## 2018-07-16 PROCEDURE — 74183 MRI ABD W/O CNTR FLWD CNTR: CPT

## 2018-07-16 PROCEDURE — A9579: CPT

## 2018-07-16 PROCEDURE — 76705 ECHO EXAM OF ABDOMEN: CPT

## 2018-07-16 PROCEDURE — 76705 ECHO EXAM OF ABDOMEN: CPT | Mod: 26

## 2018-07-16 PROCEDURE — 82565 ASSAY OF CREATININE: CPT

## 2018-07-18 ENCOUNTER — RX RENEWAL (OUTPATIENT)
Age: 68
End: 2018-07-18

## 2018-07-20 ENCOUNTER — APPOINTMENT (OUTPATIENT)
Dept: FAMILY MEDICINE | Facility: CLINIC | Age: 68
End: 2018-07-20
Payer: MEDICARE

## 2018-07-20 VITALS — RESPIRATION RATE: 14 BRPM

## 2018-07-20 VITALS
HEART RATE: 65 BPM | WEIGHT: 170 LBS | BODY MASS INDEX: 32.1 KG/M2 | DIASTOLIC BLOOD PRESSURE: 64 MMHG | HEIGHT: 61 IN | OXYGEN SATURATION: 94 % | SYSTOLIC BLOOD PRESSURE: 110 MMHG | TEMPERATURE: 97.7 F

## 2018-07-20 PROCEDURE — 99213 OFFICE O/P EST LOW 20 MIN: CPT
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
## 2018-07-20 NOTE — HISTORY OF PRESENT ILLNESS
[FreeTextEntry1] : See history of present illness [de-identified] : This is a 68-year-old female presents today for followup appointment. Still complaining of right-sided abdominal discomfort. Still has asymmetry of the abdominal wall. Both CT scan and MRI with and without contrast were done, which showed no abdominal wall, and no abdominal mass. Patient will be following up with pain management and physiatry. Most likely will get epidural steroid injection for her chronic back pain and possibly will work for the neuropathy. Also.

## 2018-07-20 NOTE — HEALTH RISK ASSESSMENT
[No falls in past year] : Patient reported no falls in the past year [0] : 2) Feeling down, depressed, or hopeless: Not at all (0) [] : No [[id number]] : 0

## 2018-07-20 NOTE — ASSESSMENT
[FreeTextEntry1] : Assessment and plan:\par \par Continue present medical management without change. Followup with physiatry and pain management

## 2018-07-25 ENCOUNTER — RX RENEWAL (OUTPATIENT)
Age: 68
End: 2018-07-25

## 2018-07-27 PROBLEM — Z12.31 BREAST CANCER SCREENING: Status: ACTIVE | Noted: 2017-03-09

## 2018-07-31 ENCOUNTER — APPOINTMENT (OUTPATIENT)
Dept: PHYSICAL MEDICINE AND REHAB | Facility: CLINIC | Age: 68
End: 2018-07-31
Payer: MEDICARE

## 2018-07-31 ENCOUNTER — OUTPATIENT (OUTPATIENT)
Dept: OUTPATIENT SERVICES | Facility: HOSPITAL | Age: 68
LOS: 1 days | End: 2018-07-31
Payer: MEDICARE

## 2018-07-31 DIAGNOSIS — Z96.653 PRESENCE OF ARTIFICIAL KNEE JOINT, BILATERAL: Chronic | ICD-10-CM

## 2018-07-31 DIAGNOSIS — K43.2 INCISIONAL HERNIA WITHOUT OBSTRUCTION OR GANGRENE: Chronic | ICD-10-CM

## 2018-07-31 DIAGNOSIS — M54.15 RADICULOPATHY, THORACOLUMBAR REGION: ICD-10-CM

## 2018-07-31 DIAGNOSIS — Z98.89 OTHER SPECIFIED POSTPROCEDURAL STATES: Chronic | ICD-10-CM

## 2018-07-31 DIAGNOSIS — M54.14 RADICULOPATHY, THORACIC REGION: ICD-10-CM

## 2018-07-31 PROCEDURE — 64479 NJX AA&/STRD TFRM EPI C/T 1: CPT

## 2018-07-31 PROCEDURE — 64483 NJX AA&/STRD TFRM EPI L/S 1: CPT

## 2018-07-31 PROCEDURE — 64484 NJX AA&/STRD TFRM EPI L/S EA: CPT

## 2018-07-31 PROCEDURE — 64480 NJX AA&/STRD TFRM EPI C/T EA: CPT

## 2018-08-01 ENCOUNTER — APPOINTMENT (OUTPATIENT)
Dept: FAMILY MEDICINE | Facility: CLINIC | Age: 68
End: 2018-08-01

## 2018-08-08 ENCOUNTER — RX RENEWAL (OUTPATIENT)
Age: 68
End: 2018-08-08

## 2018-08-23 ENCOUNTER — RX RENEWAL (OUTPATIENT)
Age: 68
End: 2018-08-23

## 2018-09-26 ENCOUNTER — APPOINTMENT (OUTPATIENT)
Dept: FAMILY MEDICINE | Facility: CLINIC | Age: 68
End: 2018-09-26
Payer: MEDICARE

## 2018-09-26 VITALS
TEMPERATURE: 98 F | BODY MASS INDEX: 30.96 KG/M2 | HEART RATE: 73 BPM | OXYGEN SATURATION: 93 % | HEIGHT: 61 IN | WEIGHT: 164 LBS | DIASTOLIC BLOOD PRESSURE: 60 MMHG | SYSTOLIC BLOOD PRESSURE: 116 MMHG

## 2018-09-26 VITALS — RESPIRATION RATE: 14 BRPM
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
## 2018-09-26 DIAGNOSIS — S81.812S LACERATION W/OUT FOREIGN BODY, LEFT LOWER LEG, SEQUELA: ICD-10-CM

## 2018-09-26 PROCEDURE — G0008: CPT

## 2018-09-26 PROCEDURE — 90662 IIV NO PRSV INCREASED AG IM: CPT

## 2018-09-26 PROCEDURE — 99214 OFFICE O/P EST MOD 30 MIN: CPT | Mod: 25

## 2018-09-26 RX ORDER — CEPHALEXIN 500 MG/1
500 CAPSULE ORAL
Qty: 40 | Refills: 0 | Status: COMPLETED | COMMUNITY
Start: 2018-08-21
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
## 2018-09-26 NOTE — HEALTH RISK ASSESSMENT
[Any fall with injury in past year] : Patient reported fall with injury in the past year [0] : 2) Feeling down, depressed, or hopeless: Not at all (0) [] : No [[id number]] : 0
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
## 2018-09-26 NOTE — REVIEW OF SYSTEMS
[Abdominal Pain] : abdominal pain [Joint Pain] : joint pain [Joint Stiffness] : joint stiffness [Back Pain] : back pain [Negative] : Heme/Lymph [Nausea] : no nausea [Constipation] : no constipation [Diarrhea] : diarrhea [Vomiting] : no vomiting [Heartburn] : no heartburn [Melena] : no melena [Joint Swelling] : no joint swelling [Muscle Weakness] : no muscle weakness [Muscle Pain] : no muscle pain [de-identified] : Laceration left calf
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
## 2018-09-27 ENCOUNTER — MEDICATION RENEWAL (OUTPATIENT)
Age: 68
End: 2018-09-27

## 2018-10-04 ENCOUNTER — APPOINTMENT (OUTPATIENT)
Dept: MRI IMAGING | Facility: HOSPITAL | Age: 68
End: 2018-10-04

## 2018-10-04 ENCOUNTER — RX RENEWAL (OUTPATIENT)
Age: 68
End: 2018-10-04

## 2018-10-04 ENCOUNTER — OUTPATIENT (OUTPATIENT)
Dept: OUTPATIENT SERVICES | Facility: HOSPITAL | Age: 68
LOS: 1 days | End: 2018-10-04
Payer: SELF-PAY

## 2018-10-04 DIAGNOSIS — Z00.8 ENCOUNTER FOR OTHER GENERAL EXAMINATION: ICD-10-CM

## 2018-10-04 DIAGNOSIS — K43.2 INCISIONAL HERNIA WITHOUT OBSTRUCTION OR GANGRENE: Chronic | ICD-10-CM

## 2018-10-04 DIAGNOSIS — Z98.89 OTHER SPECIFIED POSTPROCEDURAL STATES: Chronic | ICD-10-CM

## 2018-10-04 DIAGNOSIS — Z96.653 PRESENCE OF ARTIFICIAL KNEE JOINT, BILATERAL: Chronic | ICD-10-CM

## 2018-10-04 PROCEDURE — 72141 MRI NECK SPINE W/O DYE: CPT

## 2018-10-04 PROCEDURE — 72148 MRI LUMBAR SPINE W/O DYE: CPT

## 2018-10-04 PROCEDURE — 72141 MRI NECK SPINE W/O DYE: CPT | Mod: 26

## 2018-10-04 PROCEDURE — 72148 MRI LUMBAR SPINE W/O DYE: CPT | Mod: 26

## 2018-10-11 ENCOUNTER — APPOINTMENT (OUTPATIENT)
Dept: RADIOLOGY | Facility: HOSPITAL | Age: 68
End: 2018-10-11
Payer: MEDICARE

## 2018-10-11 ENCOUNTER — OUTPATIENT (OUTPATIENT)
Dept: OUTPATIENT SERVICES | Facility: HOSPITAL | Age: 68
LOS: 1 days | End: 2018-10-11
Payer: MEDICARE

## 2018-10-11 ENCOUNTER — APPOINTMENT (OUTPATIENT)
Dept: CT IMAGING | Facility: HOSPITAL | Age: 68
End: 2018-10-11
Payer: MEDICARE

## 2018-10-11 ENCOUNTER — APPOINTMENT (OUTPATIENT)
Dept: ORTHOPEDIC SURGERY | Facility: CLINIC | Age: 68
End: 2018-10-11

## 2018-10-11 DIAGNOSIS — K43.2 INCISIONAL HERNIA WITHOUT OBSTRUCTION OR GANGRENE: Chronic | ICD-10-CM

## 2018-10-11 DIAGNOSIS — Z96.653 PRESENCE OF ARTIFICIAL KNEE JOINT, BILATERAL: Chronic | ICD-10-CM

## 2018-10-11 DIAGNOSIS — Z00.8 ENCOUNTER FOR OTHER GENERAL EXAMINATION: ICD-10-CM

## 2018-10-11 DIAGNOSIS — Z98.89 OTHER SPECIFIED POSTPROCEDURAL STATES: Chronic | ICD-10-CM

## 2018-10-11 PROCEDURE — 72125 CT NECK SPINE W/O DYE: CPT

## 2018-10-11 PROCEDURE — 72125 CT NECK SPINE W/O DYE: CPT | Mod: 26

## 2018-10-11 PROCEDURE — 73562 X-RAY EXAM OF KNEE 3: CPT | Mod: 26,RT

## 2018-10-11 PROCEDURE — 73562 X-RAY EXAM OF KNEE 3: CPT

## 2018-10-25 ENCOUNTER — APPOINTMENT (OUTPATIENT)
Dept: FAMILY MEDICINE | Facility: CLINIC | Age: 68
End: 2018-10-25
Payer: MEDICARE

## 2018-10-25 VITALS — RESPIRATION RATE: 16 BRPM

## 2018-10-25 VITALS
OXYGEN SATURATION: 95 % | HEIGHT: 61 IN | HEART RATE: 61 BPM | SYSTOLIC BLOOD PRESSURE: 90 MMHG | WEIGHT: 168 LBS | DIASTOLIC BLOOD PRESSURE: 48 MMHG | BODY MASS INDEX: 31.72 KG/M2 | TEMPERATURE: 97.8 F

## 2018-10-25 DIAGNOSIS — M75.32 CALCIFIC TENDINITIS OF LEFT SHOULDER: ICD-10-CM

## 2018-10-25 DIAGNOSIS — M54.2 CERVICALGIA: ICD-10-CM

## 2018-10-25 PROCEDURE — 99214 OFFICE O/P EST MOD 30 MIN: CPT

## 2018-10-25 RX ORDER — DILTIAZEM HYDROCHLORIDE 240 MG/1
240 CAPSULE, EXTENDED RELEASE ORAL
Qty: 30 | Refills: 4 | Status: COMPLETED | COMMUNITY
Start: 2017-09-11 | End: 2018-10-25

## 2018-10-25 NOTE — HISTORY OF PRESENT ILLNESS
[FreeTextEntry1] : See history of present illness [de-identified] : Patient is a 68-year-old female, who presents today for followup appointment. Patient has multiple medical problems, especially arthritic patient has a long history of back pain. She has a history of calcific tendinitis of shoulder and neck pain recently seen by physiatry and steroid injection was administered. Medical history is significant for hypothyroidism, hypertension, hyperlipidemia, and elevated BMI. Patient is awake, alert, and oriented x3 in no acute distress at this time. Patient presently denies any chest pain, shortness of breath, nausea, or vomiting

## 2018-10-25 NOTE — REVIEW OF SYSTEMS
[Joint Pain] : joint pain [Joint Stiffness] : joint stiffness [Back Pain] : back pain [Negative] : Heme/Lymph [Joint Swelling] : no joint swelling [Muscle Weakness] : no muscle weakness [Muscle Pain] : no muscle pain [FreeTextEntry7] : History of diverticulitis, resolved [de-identified] : Laceration left calf

## 2018-10-25 NOTE — ASSESSMENT
[FreeTextEntry1] : Assessment and plan:\par \par 1. Hypertension in fact, the patient presently is hypotensive. Therefore, I will decrease the diltiazem to 120 mg p.o. daily. Patient will be checking blood pressure and notify me if the blood pressure increases.\par \par 2. Hypothyroidism. Continue levothyroxine 125 mcg\par \par 3. Peripheral neuropathy decreased the dose of gabapentin to 300 mg 3 times a day.\par \par 4. No change in other medications. Detailed discussion with patient regarding blood pressure management. Patient will be returning within one month at that time. I will obtain comprehensive blood work.\par \par 5. Blood pressure monitor prescribed. Patient will be checking home blood pressure and keep a log
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
## 2018-10-25 NOTE — HEALTH RISK ASSESSMENT
[Any fall with injury in past year] : Patient reported fall with injury in the past year [0] : 2) Feeling down, depressed, or hopeless: Not at all (0) [] : No [[id number]] : 0

## 2018-11-20 ENCOUNTER — RX RENEWAL (OUTPATIENT)
Age: 68
End: 2018-11-20

## 2018-11-27 ENCOUNTER — RX RENEWAL (OUTPATIENT)
Age: 68
End: 2018-11-27

## 2018-12-05 ENCOUNTER — LABORATORY RESULT (OUTPATIENT)
Age: 68
End: 2018-12-05

## 2018-12-05 ENCOUNTER — APPOINTMENT (OUTPATIENT)
Dept: FAMILY MEDICINE | Facility: CLINIC | Age: 68
End: 2018-12-05
Payer: MEDICARE

## 2018-12-05 VITALS
TEMPERATURE: 97.9 F | HEIGHT: 61 IN | BODY MASS INDEX: 30.21 KG/M2 | WEIGHT: 160 LBS | SYSTOLIC BLOOD PRESSURE: 128 MMHG | DIASTOLIC BLOOD PRESSURE: 80 MMHG | HEART RATE: 63 BPM | OXYGEN SATURATION: 96 %

## 2018-12-05 PROCEDURE — 99214 OFFICE O/P EST MOD 30 MIN: CPT | Mod: 25

## 2018-12-05 PROCEDURE — 36415 COLL VENOUS BLD VENIPUNCTURE: CPT

## 2018-12-05 NOTE — ASSESSMENT
[FreeTextEntry1] : Assessment and plan:\par \par 1. Hypertension excellent blood pressure control. We will continue present medical management without change.\par \par 2. Hyperlipidemia. Patient tolerating statin without side effects. Continue lovastatin.\par \par 3. Hypothyroidism. Obtain comprehensive blood work, TSH, and free T4.\par \par 4. Elevated BMI. Patient doing well with diet continues to lose weight.
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
## 2018-12-05 NOTE — HISTORY OF PRESENT ILLNESS
[Hyperlipidemia] : Hyperlipidemia [Hypertension] : Hypertension [Obesity] : Obesity [Checks BP Sporadically] : The patient checks ~his/her~ blood pressure sporadically [<130/90] : Target blood pressure is  <130/90 [Target goal met] : BP target goal met [Doing Well] : Patient is doing well [Managed with medications] : managed with  medication [Weight Loss ___ Pounds] : Weight loss of [unfilled] pounds [___ # of attempts] : [unfilled] weight lost attempts [Following Diet Successfully] : Following the diet successfully [Sedentary] : Patient is sedentary [Following  treatment as advised] : Patient is following  treatment as advised [Action] : Action: patient is following a plan to lose weight [Good understanding] : Patient has a good understanding of disease, goals and obesity follow-up plan [FreeTextEntry6] : Patient is a 68-year-old female, who presents today for followup appointment. Medical history is significant for essential hypertension. Patient's blood pressure was running high. Socially. She has been taking home blood pressure in home blood pressure log is within normal limits. Therefore, the blood pressure appears to have returned to normal. Patient also has history of hyperlipidemia, hypothyroidism, and degenerative joint disease with chronic back and knee pain. Patient is followed closely by physiatry and physical therapy.

## 2018-12-05 NOTE — REVIEW OF SYSTEMS
[Joint Pain] : joint pain [Joint Stiffness] : joint stiffness [Back Pain] : back pain [Negative] : Heme/Lymph [Joint Swelling] : no joint swelling [Muscle Weakness] : no muscle weakness [Muscle Pain] : no muscle pain [FreeTextEntry7] : History of diverticulitis, resolved [de-identified] : Laceration left calf

## 2018-12-05 NOTE — HEALTH RISK ASSESSMENT
[No falls in past year] : Patient reported no falls in the past year [0] : 2) Feeling down, depressed, or hopeless: Not at all (0) [] : No [[id number]] : 0

## 2018-12-06 LAB
ALBUMIN SERPL ELPH-MCNC: 4.1 G/DL
ALP BLD-CCNC: 71 U/L
ALT SERPL-CCNC: 10 U/L
ANION GAP SERPL CALC-SCNC: 14 MMOL/L
AST SERPL-CCNC: 19 U/L
BASOPHILS # BLD AUTO: 0.19 K/UL
BASOPHILS NFR BLD AUTO: 2.7 %
BILIRUB SERPL-MCNC: 0.3 MG/DL
BUN SERPL-MCNC: 21 MG/DL
CALCIUM SERPL-MCNC: 9.5 MG/DL
CHLORIDE SERPL-SCNC: 106 MMOL/L
CHOLEST SERPL-MCNC: 135 MG/DL
CHOLEST/HDLC SERPL: 2.9 RATIO
CK SERPL-CCNC: 95 U/L
CO2 SERPL-SCNC: 25 MMOL/L
CREAT SERPL-MCNC: 1.21 MG/DL
EOSINOPHIL # BLD AUTO: 0.28 K/UL
EOSINOPHIL NFR BLD AUTO: 4 %
GLUCOSE SERPL-MCNC: 92 MG/DL
HBA1C MFR BLD HPLC: 5.7 %
HCT VFR BLD CALC: 38.7 %
HDLC SERPL-MCNC: 46 MG/DL
HGB BLD-MCNC: 11.7 G/DL
IMM GRANULOCYTES NFR BLD AUTO: 0.4 %
LDLC SERPL CALC-MCNC: 67 MG/DL
LYMPHOCYTES # BLD AUTO: 2.44 K/UL
LYMPHOCYTES NFR BLD AUTO: 34.7 %
MAN DIFF?: NORMAL
MCHC RBC-ENTMCNC: 27.3 PG
MCHC RBC-ENTMCNC: 30.2 GM/DL
MCV RBC AUTO: 90.4 FL
MONOCYTES # BLD AUTO: 0.71 K/UL
MONOCYTES NFR BLD AUTO: 10.1 %
NEUTROPHILS # BLD AUTO: 3.38 K/UL
NEUTROPHILS NFR BLD AUTO: 48.1 %
PLATELET # BLD AUTO: 258 K/UL
POTASSIUM SERPL-SCNC: 4.5 MMOL/L
PROT SERPL-MCNC: 6.8 G/DL
RBC # BLD: 4.28 M/UL
RBC # FLD: 14.8 %
SODIUM SERPL-SCNC: 145 MMOL/L
T4 FREE SERPL-MCNC: 1.8 NG/DL
TRIGL SERPL-MCNC: 109 MG/DL
TSH SERPL-ACNC: 0.15 UIU/ML
URATE SERPL-MCNC: 4.5 MG/DL
WBC # FLD AUTO: 7.03 K/UL

## 2019-01-17 ENCOUNTER — RX RENEWAL (OUTPATIENT)
Age: 69
End: 2019-01-17

## 2019-01-28 ENCOUNTER — RX RENEWAL (OUTPATIENT)
Age: 69
End: 2019-01-28

## 2019-02-01 ENCOUNTER — APPOINTMENT (OUTPATIENT)
Dept: SURGERY | Facility: CLINIC | Age: 69
End: 2019-02-01
Payer: MEDICARE

## 2019-02-01 VITALS — BODY MASS INDEX: 31.34 KG/M2 | HEIGHT: 61 IN | WEIGHT: 166 LBS

## 2019-02-01 DIAGNOSIS — Z80.0 FAMILY HISTORY OF MALIGNANT NEOPLASM OF DIGESTIVE ORGANS: ICD-10-CM

## 2019-02-01 DIAGNOSIS — S30.1XXA CONTUSION OF ABDOMINAL WALL, INITIAL ENCOUNTER: ICD-10-CM

## 2019-02-01 PROCEDURE — 99214 OFFICE O/P EST MOD 30 MIN: CPT

## 2019-02-03 NOTE — ASSESSMENT
[FreeTextEntry1] : Long discussion regarding all options and risks\par I did not recommend repair of the ventral hernia due to the very high recurrence rate in this subcostal position.

## 2019-02-03 NOTE — HISTORY OF PRESENT ILLNESS
[de-identified] : The right sided abdominal mass is larger and becoming more uncomfortable. She denies any fever or chills nor any difficulty eating or drinking. The mass disappears upon reclining

## 2019-02-03 NOTE — REVIEW OF SYSTEMS
[Heart Rate Is Slow] : the heart rate was not slow [Chest Pain] : no chest pain [Shortness Of Breath] : no shortness of breath [Abdominal Pain] : no abdominal pain [Constipation] : no constipation [Negative] : Genitourinary

## 2019-02-03 NOTE — PHYSICAL EXAM
[Normal Breath Sounds] : Normal breath sounds [Normal Heart Sounds] : normal heart sounds [Normal Rate and Rhythm] : normal rate and rhythm [Abdominal Masses] : Abdominal mass present [Abdomen Tenderness] : ~T ~M No abdominal tenderness [No Rash or Lesion] : No rash or lesion [de-identified] : nl [de-identified] : nl [de-identified] : reducible right subcostal hernia [de-identified] : nl

## 2019-02-04 ENCOUNTER — RX RENEWAL (OUTPATIENT)
Age: 69
End: 2019-02-04

## 2019-02-05 ENCOUNTER — MEDICATION RENEWAL (OUTPATIENT)
Age: 69
End: 2019-02-05

## 2019-02-11 ENCOUNTER — RX RENEWAL (OUTPATIENT)
Age: 69
End: 2019-02-11

## 2019-02-13 ENCOUNTER — APPOINTMENT (OUTPATIENT)
Dept: FAMILY MEDICINE | Facility: CLINIC | Age: 69
End: 2019-02-13

## 2019-03-05 ENCOUNTER — APPOINTMENT (OUTPATIENT)
Dept: FAMILY MEDICINE | Facility: CLINIC | Age: 69
End: 2019-03-05
Payer: MEDICARE

## 2019-03-05 VITALS
TEMPERATURE: 97.8 F | HEIGHT: 61 IN | DIASTOLIC BLOOD PRESSURE: 76 MMHG | HEART RATE: 65 BPM | OXYGEN SATURATION: 96 % | BODY MASS INDEX: 31.53 KG/M2 | SYSTOLIC BLOOD PRESSURE: 148 MMHG | WEIGHT: 167 LBS

## 2019-03-05 DIAGNOSIS — R19.00 INTRA-ABDOMINAL AND PELVIC SWELLING, MASS AND LUMP, UNSPECIFIED SITE: ICD-10-CM

## 2019-03-05 DIAGNOSIS — H61.23 IMPACTED CERUMEN, BILATERAL: ICD-10-CM

## 2019-03-05 DIAGNOSIS — H60.90 UNSPECIFIED OTITIS EXTERNA, UNSPECIFIED EAR: ICD-10-CM

## 2019-03-05 PROCEDURE — 99215 OFFICE O/P EST HI 40 MIN: CPT | Mod: 25

## 2019-03-05 PROCEDURE — 69210 REMOVE IMPACTED EAR WAX UNI: CPT

## 2019-03-05 RX ORDER — AZITHROMYCIN 250 MG/1
250 TABLET, FILM COATED ORAL
Qty: 1 | Refills: 0 | Status: COMPLETED | COMMUNITY
Start: 2018-10-25 | End: 2019-03-05

## 2019-03-05 RX ORDER — DILTIAZEM HYDROCHLORIDE 120 MG/1
120 CAPSULE, EXTENDED RELEASE ORAL
Qty: 90 | Refills: 3 | Status: COMPLETED | COMMUNITY
Start: 2018-11-20 | End: 2019-03-05

## 2019-03-05 RX ORDER — AMOXICILLIN 500 MG/1
500 TABLET, FILM COATED ORAL
Qty: 20 | Refills: 1 | Status: COMPLETED | COMMUNITY
Start: 2017-12-13 | End: 2019-03-05

## 2019-03-05 NOTE — HISTORY OF PRESENT ILLNESS
[FreeTextEntry1] : 1. Ear pain, right.\par \par 2. Flank pain, right, radiating to back.\par \par 3. Rectal bleeding [de-identified] : Patient is a 68-year-old female, who presents today for a followup appointment with multiple medical problems. Chief complaint today is ear pain, right ear that's been ongoing for greater than one week and a right flank pain that radiates to the back. Patient has been seen by surgeons. Has had CT scans, MRIs, and also x-rays. Her most recent evaluation by surgery there stating that the patient has a hernia in that area. Patient is going for a second opinion medical history is significant for hypothyroidism, hypertension, hyperlipidemia, and elevated BMI.\par \par Patient also complaining of rectal bleeding, which has been worked up in the past with colonoscopy done by Dr. Villafana approximately 2 years ago. Patient will be following up which general surgery later today. Most likely the rectal bleeding is hemorrhoidal, but she will have comprehensive workup later today.
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
## 2019-03-05 NOTE — REVIEW OF SYSTEMS
[Fatigue] : fatigue [Earache] : earache [Hearing Loss] : hearing loss [Abdominal Pain] : abdominal pain [Joint Pain] : joint pain [Joint Stiffness] : joint stiffness [Back Pain] : back pain [Negative] : Heme/Lymph [Nosebleed] : no nosebleeds [Hoarseness] : no hoarseness [Nasal Discharge] : no nasal discharge [Sore Throat] : no sore throat [Postnasal Drip] : no postnasal drip [Nausea] : no nausea [Constipation] : no constipation [Diarrhea] : diarrhea [Vomiting] : no vomiting [Heartburn] : no heartburn [Melena] : no melena [Joint Swelling] : no joint swelling [Muscle Weakness] : no muscle weakness [Muscle Pain] : no muscle pain [de-identified] : Laceration left calf

## 2019-03-06 ENCOUNTER — APPOINTMENT (OUTPATIENT)
Dept: PHYSICAL MEDICINE AND REHAB | Facility: CLINIC | Age: 69
End: 2019-03-06
Payer: MEDICARE

## 2019-03-06 VITALS
TEMPERATURE: 97.6 F | SYSTOLIC BLOOD PRESSURE: 116 MMHG | HEART RATE: 62 BPM | OXYGEN SATURATION: 91 % | DIASTOLIC BLOOD PRESSURE: 70 MMHG

## 2019-03-06 PROCEDURE — 99214 OFFICE O/P EST MOD 30 MIN: CPT

## 2019-03-06 NOTE — REVIEW OF SYSTEMS
[Abdominal Pain] : abdominal pain [Constipation] : constipation [Joint Pain] : joint pain [Joint Stiffness] : joint stiffness [Easy Bruising] : a tendency for easy bruising [Negative] : Heme/Lymph

## 2019-03-06 NOTE — PHYSICAL EXAM
[FreeTextEntry1] : General: NAD, alert\par Psych: normal mood and affect\par HEENT: NC/AT, normal visual tracking\par Pulmonary: no resp distress, chest expansion appears symmetrical\par CV: extremities are warm and perfused\par Abd: non-distended\par Ext: no c/c/e\par \par Lumbar/Hip Spine:\par Inspection: normal muscle bulk without asymmetry\par Tenderness to palpation: swelling over the right lateral abdomen with tenderness, mild TTP over the right upper lumbar paraspinals, low thoracic paraspinals, no TTP over the PSIS, greater trochanter, sacroiliac joints,  piriformis\par ROM: within functional limits and without pain\par MMT: 5/5 bilateral lower extremities\par Reflexes: depressed right patella reflex, otherwise symmetric bilateral achilles\par Sensory: intact to light touch in all dermatomes of the bilateral lower extremities.

## 2019-03-06 NOTE — ASSESSMENT
[FreeTextEntry1] : Ms. WRIGHT is a 68 year old woman here for evaluation of mid back pain radiating to her abdomen. Based on the clinical evaluation, review of available imaging, pain is likely secondary to right thoracic radiculopathy; however, there is a degree with swelling over the right anterior abdomen. She is planned for hernia repair. In regards to her pain, I feel that there is a radicular component to her pain. Recommedn repeat right  T11/12, T12/L1 TFESI with fluoroscopic guidance; however, given the proximity of surgery, recommedn that she contact her surgeon prior to procedure. She will follow up with me for procedure.\par

## 2019-03-06 NOTE — HISTORY OF PRESENT ILLNESS
[FreeTextEntry1] : Ms. EZEQUIEL WRIGHT is a 68 year old female here for follow up for right mid back pain radiating to her abdomen. She had improvement with MRI, epidural injection on 10/2017. She reports swelling over her right anterior abdomen. She spoke with surgeon, thought to be related to hernia, is pending surgical correction. She had prior MRI, found to have right T11/12 neuroforaminal narrowing with worsening radicular, here for consultation for repeat BRYSON.\par \par Location: right sided mid back radiating to her abdomen\par Duration: started > 1 year ago, improved with BRYSON, now returned\par Inciting Event/Context: no specific inciting event or trauma\par Onset/Timing: constant\par Quality: sharp, shooting\par Severity: 7-8/10\par Exacerbating factors: worse with activities\par Relieving factors: gabapentin\par Radiation: into the right abdomen in T10, 11 distribution\par Numbness/Tingling: in distribution of radiation at times\par Bowel/Bladder neurological incontinence: denies\par lower ext. weakness: denies\par \par Prior Treatments:\par Physical Therapy: PT without benefit\par Injections: BRYSON with several months of relief\par Surgeries: gabapentin\par Imaging: MRI thoracic spine with T11/ 12 neuroforaminal narrowing

## 2019-03-12 ENCOUNTER — APPOINTMENT (OUTPATIENT)
Dept: PHYSICAL MEDICINE AND REHAB | Facility: CLINIC | Age: 69
End: 2019-03-12

## 2019-03-15 ENCOUNTER — OUTPATIENT (OUTPATIENT)
Dept: OUTPATIENT SERVICES | Facility: HOSPITAL | Age: 69
LOS: 1 days | End: 2019-03-15
Payer: MEDICARE

## 2019-03-15 VITALS
OXYGEN SATURATION: 99 % | TEMPERATURE: 98 F | HEART RATE: 55 BPM | SYSTOLIC BLOOD PRESSURE: 120 MMHG | HEIGHT: 60 IN | WEIGHT: 164.02 LBS | RESPIRATION RATE: 14 BRPM | DIASTOLIC BLOOD PRESSURE: 60 MMHG

## 2019-03-15 DIAGNOSIS — Z98.89 OTHER SPECIFIED POSTPROCEDURAL STATES: Chronic | ICD-10-CM

## 2019-03-15 DIAGNOSIS — K46.9 UNSPECIFIED ABDOMINAL HERNIA WITHOUT OBSTRUCTION OR GANGRENE: ICD-10-CM

## 2019-03-15 DIAGNOSIS — K43.2 INCISIONAL HERNIA WITHOUT OBSTRUCTION OR GANGRENE: Chronic | ICD-10-CM

## 2019-03-15 DIAGNOSIS — Z98.890 OTHER SPECIFIED POSTPROCEDURAL STATES: Chronic | ICD-10-CM

## 2019-03-15 DIAGNOSIS — Z01.818 ENCOUNTER FOR OTHER PREPROCEDURAL EXAMINATION: ICD-10-CM

## 2019-03-15 DIAGNOSIS — K43.9 VENTRAL HERNIA WITHOUT OBSTRUCTION OR GANGRENE: ICD-10-CM

## 2019-03-15 DIAGNOSIS — Z96.653 PRESENCE OF ARTIFICIAL KNEE JOINT, BILATERAL: Chronic | ICD-10-CM

## 2019-03-15 LAB
ANION GAP SERPL CALC-SCNC: 6 MMOL/L — SIGNIFICANT CHANGE UP (ref 5–17)
BUN SERPL-MCNC: 22 MG/DL — SIGNIFICANT CHANGE UP (ref 7–23)
CALCIUM SERPL-MCNC: 9.4 MG/DL — SIGNIFICANT CHANGE UP (ref 8.4–10.5)
CHLORIDE SERPL-SCNC: 104 MMOL/L — SIGNIFICANT CHANGE UP (ref 96–108)
CO2 SERPL-SCNC: 31 MMOL/L — SIGNIFICANT CHANGE UP (ref 22–31)
CREAT SERPL-MCNC: 0.9 MG/DL — SIGNIFICANT CHANGE UP (ref 0.5–1.3)
GLUCOSE SERPL-MCNC: 89 MG/DL — SIGNIFICANT CHANGE UP (ref 70–99)
HCT VFR BLD CALC: 37.8 % — SIGNIFICANT CHANGE UP (ref 34.5–45)
HGB BLD-MCNC: 11.8 G/DL — SIGNIFICANT CHANGE UP (ref 11.5–15.5)
MCHC RBC-ENTMCNC: 28 PG — SIGNIFICANT CHANGE UP (ref 27–34)
MCHC RBC-ENTMCNC: 31.2 GM/DL — LOW (ref 32–36)
MCV RBC AUTO: 89.6 FL — SIGNIFICANT CHANGE UP (ref 80–100)
NRBC # BLD: 0 /100 WBCS — SIGNIFICANT CHANGE UP (ref 0–0)
PLATELET # BLD AUTO: 234 K/UL — SIGNIFICANT CHANGE UP (ref 150–400)
POTASSIUM SERPL-MCNC: 3.8 MMOL/L — SIGNIFICANT CHANGE UP (ref 3.5–5.3)
POTASSIUM SERPL-SCNC: 3.8 MMOL/L — SIGNIFICANT CHANGE UP (ref 3.5–5.3)
RBC # BLD: 4.22 M/UL — SIGNIFICANT CHANGE UP (ref 3.8–5.2)
RBC # FLD: 14 % — SIGNIFICANT CHANGE UP (ref 10.3–14.5)
SODIUM SERPL-SCNC: 141 MMOL/L — SIGNIFICANT CHANGE UP (ref 135–145)
WBC # BLD: 6.63 K/UL — SIGNIFICANT CHANGE UP (ref 3.8–10.5)
WBC # FLD AUTO: 6.63 K/UL — SIGNIFICANT CHANGE UP (ref 3.8–10.5)

## 2019-03-15 PROCEDURE — 93010 ELECTROCARDIOGRAM REPORT: CPT

## 2019-03-15 PROCEDURE — G0463: CPT

## 2019-03-15 PROCEDURE — 80048 BASIC METABOLIC PNL TOTAL CA: CPT

## 2019-03-15 PROCEDURE — 93005 ELECTROCARDIOGRAM TRACING: CPT

## 2019-03-15 PROCEDURE — 36415 COLL VENOUS BLD VENIPUNCTURE: CPT

## 2019-03-15 PROCEDURE — 85027 COMPLETE CBC AUTOMATED: CPT

## 2019-03-15 NOTE — H&P PST ADULT - NSICDXFAMILYHX_GEN_ALL_CORE_FT
FAMILY HISTORY:  Family history of premature CAD  Family history of stomach cancer    Sibling  Still living? No  Family history of diabetes mellitus (DM), Age at diagnosis: 51-60

## 2019-03-15 NOTE — H&P PST ADULT - NEGATIVE ENMT SYMPTOMS
no abnormal taste sensation/no hearing difficulty/no vertigo/no nasal congestion/no sinus symptoms/no ear pain/no tinnitus

## 2019-03-15 NOTE — H&P PST ADULT - NSANTHOSAYNRD_GEN_A_CORE
No. JUAN M screening performed.  STOP BANG Legend: 0-2 = LOW Risk; 3-4 = INTERMEDIATE Risk; 5-8 = HIGH Risk

## 2019-03-15 NOTE — H&P PST ADULT - GASTROINTESTINAL COMMENTS
large abdominal hernia abdomen ; large hernia noted on right upper quadrant / flank area . tender on palpation

## 2019-03-15 NOTE — H&P PST ADULT - HISTORY OF PRESENT ILLNESS
69 y/o female with 2 year history of abdominal hernia presents with complaint of enlarging hernia and right-side/ flank abdominal pain . Denies nausea or vomiting . Cat scan  was done and confirmed the diagnosis Evaluated by surgeon referred for surgery . scheduled for ventral hernia repair on 4/4/19

## 2019-03-15 NOTE — H&P PST ADULT - RS GEN PE MLT RESP DETAILS PC
respirations non-labored/no rales/no wheezes/no rhonchi/clear to auscultation bilaterally/airway patent/breath sounds equal

## 2019-03-15 NOTE — H&P PST ADULT - NSICDXPASTSURGICALHX_GEN_ALL_CORE_FT
PAST SURGICAL HISTORY:  H/O right inguinal hernia repair 2014    H/O total knee replacement, bilateral 2007    History of cholecystectomy 1997    S/P lumbar laminectomy 2015

## 2019-03-15 NOTE — H&P PST ADULT - NSICDXPROBLEM_GEN_ALL_CORE_FT
PROBLEM DIAGNOSES  Problem: Abdominal hernia  Assessment and Plan: Ventral hernia repair   Medical clearance   Pre op instructions   EKG ; PCP will address EKG in clearance , will call for EKG   attached copy of stress and Echo report done on 2016

## 2019-03-15 NOTE — H&P PST ADULT - NSICDXPASTMEDICALHX_GEN_ALL_CORE_FT
PAST MEDICAL HISTORY:  COPD, mild     GERD (gastroesophageal reflux disease)     Gout     Heart murmur     HTN (hypertension)     Hyperlipidemia     Hypothyroidism     Kidney calculi     Lumbar spinal stenosis     OA (osteoarthritis)     Pulmonary embolism 1974  due to  immobilty s/p fall and right knee injury    Rheumatic fever At 17 years old    Scoliosis

## 2019-03-19 ENCOUNTER — OUTPATIENT (OUTPATIENT)
Dept: OUTPATIENT SERVICES | Facility: HOSPITAL | Age: 69
LOS: 1 days | End: 2019-03-19
Payer: MEDICARE

## 2019-03-19 ENCOUNTER — APPOINTMENT (OUTPATIENT)
Dept: PHYSICAL MEDICINE AND REHAB | Facility: CLINIC | Age: 69
End: 2019-03-19

## 2019-03-19 DIAGNOSIS — Z98.890 OTHER SPECIFIED POSTPROCEDURAL STATES: Chronic | ICD-10-CM

## 2019-03-19 DIAGNOSIS — Z98.89 OTHER SPECIFIED POSTPROCEDURAL STATES: Chronic | ICD-10-CM

## 2019-03-19 DIAGNOSIS — M54.16 RADICULOPATHY, LUMBAR REGION: ICD-10-CM

## 2019-03-19 DIAGNOSIS — Z96.653 PRESENCE OF ARTIFICIAL KNEE JOINT, BILATERAL: Chronic | ICD-10-CM

## 2019-03-19 PROCEDURE — 64483 NJX AA&/STRD TFRM EPI L/S 1: CPT

## 2019-03-19 PROCEDURE — 64479 NJX AA&/STRD TFRM EPI C/T 1: CPT

## 2019-03-19 PROCEDURE — 64484 NJX AA&/STRD TFRM EPI L/S EA: CPT

## 2019-03-21 DIAGNOSIS — M54.15 RADICULOPATHY, THORACOLUMBAR REGION: ICD-10-CM

## 2019-03-22 ENCOUNTER — APPOINTMENT (OUTPATIENT)
Dept: FAMILY MEDICINE | Facility: CLINIC | Age: 69
End: 2019-03-22
Payer: MEDICARE

## 2019-03-22 VITALS
RESPIRATION RATE: 20 BRPM | WEIGHT: 167 LBS | HEART RATE: 68 BPM | HEIGHT: 61 IN | SYSTOLIC BLOOD PRESSURE: 114 MMHG | BODY MASS INDEX: 31.53 KG/M2 | DIASTOLIC BLOOD PRESSURE: 68 MMHG

## 2019-03-22 PROBLEM — R01.1 CARDIAC MURMUR, UNSPECIFIED: Chronic | Status: ACTIVE | Noted: 2019-03-15

## 2019-03-22 PROBLEM — M48.061 SPINAL STENOSIS, LUMBAR REGION WITHOUT NEUROGENIC CLAUDICATION: Chronic | Status: ACTIVE | Noted: 2019-03-15

## 2019-03-22 PROBLEM — J44.9 CHRONIC OBSTRUCTIVE PULMONARY DISEASE, UNSPECIFIED: Chronic | Status: ACTIVE | Noted: 2019-03-15

## 2019-03-22 PROBLEM — M41.9 SCOLIOSIS, UNSPECIFIED: Chronic | Status: ACTIVE | Noted: 2019-03-15

## 2019-03-22 PROCEDURE — 99214 OFFICE O/P EST MOD 30 MIN: CPT

## 2019-03-22 PROCEDURE — 99244 OFF/OP CNSLTJ NEW/EST MOD 40: CPT

## 2019-03-22 NOTE — RESULTS/DATA
[] : results reviewed [de-identified] : acceptable [de-identified] : acceptable  [de-identified] : no change as compared to multiple old tracings; felt to be normal tracing for this patient

## 2019-03-22 NOTE — PHYSICAL EXAM
[No Acute Distress] : no acute distress [Normal Outer Ear/Nose] : the outer ears and nose were normal in appearance [Normal Oropharynx] : the oropharynx was normal [Normal TMs] : both tympanic membranes were normal [Normal Nasal Mucosa] : the nasal mucosa was normal [No JVD] : no jugular venous distention [Supple] : supple [No Lymphadenopathy] : no lymphadenopathy [Thyroid Normal, No Nodules] : the thyroid was normal and there were no nodules present [No Respiratory Distress] : no respiratory distress  [Clear to Auscultation] : lungs were clear to auscultation bilaterally [No Accessory Muscle Use] : no accessory muscle use [Normal Rate] : normal rate  [Regular Rhythm] : with a regular rhythm [Normal S1, S2] : normal S1 and S2 [No Murmur] : no murmur heard [No Edema] : there was no peripheral edema [Soft] : abdomen soft [Non Tender] : non-tender [Non-distended] : non-distended [No Masses] : no abdominal mass palpated [No HSM] : no HSM [Normal Bowel Sounds] : normal bowel sounds [Normal Posterior Cervical Nodes] : no posterior cervical lymphadenopathy [Normal Anterior Cervical Nodes] : no anterior cervical lymphadenopathy [No Focal Deficits] : no focal deficits [Alert and Oriented x3] : oriented to person, place, and time [de-identified] : large abdominal hernia noted; not incarcerated

## 2019-03-22 NOTE — ASSESSMENT
[Patient Optimized for Surgery] : Patient optimized for surgery [No Further Testing Recommended] : no further testing recommended [FreeTextEntry4] : Clinically stable as of this encounter with acceptable PSTs.  Note again EKG unchanged and is felt to be pt's baseline

## 2019-03-22 NOTE — HISTORY OF PRESENT ILLNESS
[No Pertinent Cardiac History] : no history of aortic stenosis, atrial fibrillation, coronary artery disease, recent myocardial infarction, or implantable device/pacemaker [No Pertinent Pulmonary History] : no history of asthma, COPD, sleep apnea, or smoking [No Adverse Anesthesia Reaction] : no adverse anesthesia reaction in self or family member [Chronic Anticoagulation] : no chronic anticoagulation [Chronic Kidney Disease] : no chronic kidney disease [Diabetes] : no diabetes [(Patient denies any chest pain, claudication, dyspnea on exertion, orthopnea, palpitations or syncope)] : Patient denies any chest pain, claudication, dyspnea on exertion, orthopnea, palpitations or syncope [FreeTextEntry1] : abdominal hernia repair [FreeTextEntry2] : 4/4/19 [FreeTextEntry3] : Dr. Rose [FreeTextEntry4] : Presents for presurgical exam.  Denies recent illness, cough, temp elevation, urinary symptoms. [FreeTextEntry5] : Patient has well controlled hypertension, hypothyroidism, hyperlipidemia

## 2019-03-27 ENCOUNTER — APPOINTMENT (OUTPATIENT)
Dept: FAMILY MEDICINE | Facility: CLINIC | Age: 69
End: 2019-03-27

## 2019-03-29 NOTE — ASU DISCHARGE PLAN (ADULT/PEDIATRIC) - CALL YOUR DOCTOR IF YOU HAVE ANY OF THE FOLLOWING:
Numbness, tingling, color or temperature change to extremity/Nausea and vomiting that does not stop/Unable to urinate/Wound/Surgical Site with redness, or foul smelling discharge or pus/Excessive diarrhea/Fever greater than (need to indicate Fahrenheit or Celsius)/Pain not relieved by Medications/Increased irritability or sluggishness/Inability to tolerate liquids or foods/Bleeding that does not stop/Swelling that gets worse

## 2019-03-29 NOTE — ASU DISCHARGE PLAN (ADULT/PEDIATRIC) - ASU DC ADDTIONAL DRESSING INSTRUCTIONS FT
in 48 hours remove clear dressing and catheter. Cover site with bandaid.Change if wet. May shower Remove pain pump catheter in 48 hours (when empty) and apply band-aids to insertion site.

## 2019-03-29 NOTE — ASU DISCHARGE PLAN (ADULT/PEDIATRIC) - CARE PROVIDER_API CALL
Az Rose)  Surgery; Surgical Critical Care  [facility], [address] [place] [address]  Phone: [phone]  Fax: [phone]  Follow Up Time: 2 weeks

## 2019-03-29 NOTE — ASU DISCHARGE PLAN (ADULT/PEDIATRIC) - ASU DC SPECIAL INSTRUCTIONSFT
Please call Dr. PETRA Rose's office ([phone]) to schedule a follow-up appointment to be seen in 7-10 days.    REST! Apply ice packs to incision sites 20 mins on, 20 mins off every 4 hours for first 24 hours.  If taking narcotic pain medications, may take COLACE (OTC stool softener) as directed to prevent constipation.

## 2019-04-03 ENCOUNTER — TRANSCRIPTION ENCOUNTER (OUTPATIENT)
Age: 69
End: 2019-04-03

## 2019-04-03 ENCOUNTER — APPOINTMENT (OUTPATIENT)
Dept: PHYSICAL MEDICINE AND REHAB | Facility: CLINIC | Age: 69
End: 2019-04-03

## 2019-04-03 NOTE — ASU PATIENT PROFILE, ADULT - PMH
COPD, mild    GERD (gastroesophageal reflux disease)    Gout    Heart murmur    HTN (hypertension)    Hyperlipidemia    Hypothyroidism    Kidney calculi    Lumbar spinal stenosis    OA (osteoarthritis)    Pulmonary embolism  1974  due to  immobilty s/p fall and right knee injury  Rheumatic fever  At 17 years old  Scoliosis

## 2019-04-03 NOTE — ASU PATIENT PROFILE, ADULT - TEACHING/LEARNING CULTURAL CONSIDERATIONS
This is a 68 y/o female received ambulatory AXO&4 c/o headache 5/10. Denies any nausea, vomiting, fever, chills, diarrhea will monitor support and safety maintained. none

## 2019-04-03 NOTE — ASU PATIENT PROFILE, ADULT - PSH
H/O right inguinal hernia repair  2014  H/O total knee replacement, bilateral  2007  History of cholecystectomy  1997  S/P lumbar laminectomy  2015

## 2019-04-04 ENCOUNTER — OUTPATIENT (OUTPATIENT)
Dept: OUTPATIENT SERVICES | Facility: HOSPITAL | Age: 69
LOS: 1 days | End: 2019-04-04
Payer: MEDICARE

## 2019-04-04 VITALS
SYSTOLIC BLOOD PRESSURE: 108 MMHG | DIASTOLIC BLOOD PRESSURE: 67 MMHG | RESPIRATION RATE: 13 BRPM | OXYGEN SATURATION: 96 % | HEART RATE: 60 BPM

## 2019-04-04 VITALS
OXYGEN SATURATION: 95 % | SYSTOLIC BLOOD PRESSURE: 160 MMHG | TEMPERATURE: 98 F | HEIGHT: 61 IN | WEIGHT: 166.01 LBS | DIASTOLIC BLOOD PRESSURE: 77 MMHG | RESPIRATION RATE: 18 BRPM | HEART RATE: 65 BPM

## 2019-04-04 DIAGNOSIS — Z98.890 OTHER SPECIFIED POSTPROCEDURAL STATES: Chronic | ICD-10-CM

## 2019-04-04 DIAGNOSIS — Z96.653 PRESENCE OF ARTIFICIAL KNEE JOINT, BILATERAL: Chronic | ICD-10-CM

## 2019-04-04 DIAGNOSIS — Z98.89 OTHER SPECIFIED POSTPROCEDURAL STATES: Chronic | ICD-10-CM

## 2019-04-04 DIAGNOSIS — K43.9 VENTRAL HERNIA WITHOUT OBSTRUCTION OR GANGRENE: ICD-10-CM

## 2019-04-04 DIAGNOSIS — Z01.818 ENCOUNTER FOR OTHER PREPROCEDURAL EXAMINATION: ICD-10-CM

## 2019-04-04 PROCEDURE — 49560: CPT

## 2019-04-04 PROCEDURE — 49560: CPT | Mod: AS

## 2019-04-04 PROCEDURE — 49568: CPT | Mod: AS

## 2019-04-04 PROCEDURE — 49568: CPT

## 2019-04-04 PROCEDURE — C1781: CPT

## 2019-04-04 RX ORDER — OXYCODONE HYDROCHLORIDE 5 MG/1
5 TABLET ORAL ONCE
Qty: 0 | Refills: 0 | Status: DISCONTINUED | OUTPATIENT
Start: 2019-04-04 | End: 2019-04-04

## 2019-04-04 RX ORDER — BUPIVACAINE HCL/PF 7.5 MG/ML
100 VIAL (ML) INJECTION
Qty: 0 | Refills: 0 | Status: DISCONTINUED | OUTPATIENT
Start: 2019-04-04 | End: 2019-04-04

## 2019-04-04 RX ORDER — ONDANSETRON 8 MG/1
4 TABLET, FILM COATED ORAL ONCE
Qty: 0 | Refills: 0 | Status: COMPLETED | OUTPATIENT
Start: 2019-04-04 | End: 2019-04-04

## 2019-04-04 RX ORDER — CHLORHEXIDINE GLUCONATE 213 G/1000ML
1 SOLUTION TOPICAL ONCE
Qty: 0 | Refills: 0 | Status: COMPLETED | OUTPATIENT
Start: 2019-04-04 | End: 2019-04-04

## 2019-04-04 RX ORDER — CEFAZOLIN SODIUM 1 G
2000 VIAL (EA) INJECTION ONCE
Qty: 0 | Refills: 0 | Status: COMPLETED | OUTPATIENT
Start: 2019-04-04 | End: 2019-04-04

## 2019-04-04 RX ORDER — ENOXAPARIN SODIUM 100 MG/ML
40 INJECTION SUBCUTANEOUS ONCE
Qty: 0 | Refills: 0 | Status: COMPLETED | OUTPATIENT
Start: 2019-04-04 | End: 2019-04-04

## 2019-04-04 RX ORDER — SODIUM CHLORIDE 9 MG/ML
1000 INJECTION, SOLUTION INTRAVENOUS
Qty: 0 | Refills: 0 | Status: DISCONTINUED | OUTPATIENT
Start: 2019-04-04 | End: 2019-04-04

## 2019-04-04 RX ORDER — HYDROMORPHONE HYDROCHLORIDE 2 MG/ML
0.5 INJECTION INTRAMUSCULAR; INTRAVENOUS; SUBCUTANEOUS
Qty: 0 | Refills: 0 | Status: DISCONTINUED | OUTPATIENT
Start: 2019-04-04 | End: 2019-04-04

## 2019-04-04 RX ADMIN — HYDROMORPHONE HYDROCHLORIDE 0.5 MILLIGRAM(S): 2 INJECTION INTRAMUSCULAR; INTRAVENOUS; SUBCUTANEOUS at 11:40

## 2019-04-04 RX ADMIN — OXYCODONE HYDROCHLORIDE 5 MILLIGRAM(S): 5 TABLET ORAL at 12:40

## 2019-04-04 RX ADMIN — ONDANSETRON 4 MILLIGRAM(S): 8 TABLET, FILM COATED ORAL at 11:40

## 2019-04-04 RX ADMIN — CHLORHEXIDINE GLUCONATE 1 APPLICATION(S): 213 SOLUTION TOPICAL at 08:37

## 2019-04-04 RX ADMIN — SODIUM CHLORIDE 100 MILLILITER(S): 9 INJECTION, SOLUTION INTRAVENOUS at 11:47

## 2019-04-04 RX ADMIN — OXYCODONE HYDROCHLORIDE 5 MILLIGRAM(S): 5 TABLET ORAL at 13:10

## 2019-04-04 RX ADMIN — HYDROMORPHONE HYDROCHLORIDE 0.5 MILLIGRAM(S): 2 INJECTION INTRAMUSCULAR; INTRAVENOUS; SUBCUTANEOUS at 11:55

## 2019-04-04 RX ADMIN — ENOXAPARIN SODIUM 40 MILLIGRAM(S): 100 INJECTION SUBCUTANEOUS at 09:20

## 2019-04-04 NOTE — ASU PREOP CHECKLIST - IDENTIFICATION BAND VERIFIED
Telephone Encounter by Mariaelena Ingram CMA at 01/26/18 07:46 AM     Author:  Mariaelena Ingram CMA Service:  (none) Author Type:  Certified Medical Assistant     Filed:  01/26/18 07:47 AM Encounter Date:  1/25/2018 Status:  Signed     :  Mariaelena Ingram CMA (Certified Medical Assistant)            Patient is[HS1.1T] over[HS1.1M]due for an appt w pcp[HS1.1T] and fasting labs[HS1.1M]. Please ask patient to schedule an appt, then we can[HS1.1T] ask provider if we can[HS1.1M] send an[HS1.1T]other[HS1.1M] exception refill. Thanks to non clinical.[HS1.1T]        Revision History        User Key Date/Time User Provider Type Action    > HS1.1 01/26/18 07:47 AM Mariaelena Ingram CMA Certified Medical Assistant Sign    M - Manual, T - Template             done

## 2019-04-14 RX ORDER — ACETAMINOPHEN 500 MG
650 TABLET ORAL EVERY 6 HOURS
Qty: 0 | Refills: 0 | Status: DISCONTINUED | OUTPATIENT
Start: 2019-04-17 | End: 2019-05-02

## 2019-04-16 ENCOUNTER — TRANSCRIPTION ENCOUNTER (OUTPATIENT)
Age: 69
End: 2019-04-16

## 2019-04-16 ENCOUNTER — RX RENEWAL (OUTPATIENT)
Age: 69
End: 2019-04-16

## 2019-04-16 ENCOUNTER — APPOINTMENT (OUTPATIENT)
Dept: FAMILY MEDICINE | Facility: CLINIC | Age: 69
End: 2019-04-16
Payer: MEDICARE

## 2019-04-16 VITALS
OXYGEN SATURATION: 96 % | TEMPERATURE: 97.6 F | HEIGHT: 61 IN | SYSTOLIC BLOOD PRESSURE: 124 MMHG | WEIGHT: 170 LBS | BODY MASS INDEX: 32.1 KG/M2 | HEART RATE: 60 BPM | DIASTOLIC BLOOD PRESSURE: 66 MMHG

## 2019-04-16 VITALS — RESPIRATION RATE: 14 BRPM

## 2019-04-16 DIAGNOSIS — H26.9 UNSPECIFIED CATARACT: ICD-10-CM

## 2019-04-16 PROCEDURE — 99214 OFFICE O/P EST MOD 30 MIN: CPT

## 2019-04-16 NOTE — PHYSICAL EXAM
[No Acute Distress] : no acute distress [Well Developed] : well developed [Well-Appearing] : well-appearing [Well Nourished] : well nourished [Normal Sclera/Conjunctiva] : normal sclera/conjunctiva [Normal Voice/Communication] : normal voice/communication [EOMI] : extraocular movements intact [PERRL] : pupils equal round and reactive to light [Normal Outer Ear/Nose] : the outer ears and nose were normal in appearance [Normal Oropharynx] : the oropharynx was normal [Normal Nasal Mucosa] : the nasal mucosa was normal [Normal TMs] : both tympanic membranes were normal [No Lymphadenopathy] : no lymphadenopathy [Supple] : supple [No JVD] : no jugular venous distention [No Respiratory Distress] : no respiratory distress  [Thyroid Normal, No Nodules] : the thyroid was normal and there were no nodules present [No Accessory Muscle Use] : no accessory muscle use [Normal Rate] : normal rate  [Clear to Auscultation] : lungs were clear to auscultation bilaterally [Regular Rhythm] : with a regular rhythm [Normal S1, S2] : normal S1 and S2 [No Murmur] : no murmur heard [No Abdominal Bruit] : a ~M bruit was not heard ~T in the abdomen [No Carotid Bruits] : no carotid bruits [No Edema] : there was no peripheral edema [Pedal Pulses Present] : the pedal pulses are present [No Varicosities] : no varicosities [No Extremity Clubbing/Cyanosis] : no extremity clubbing/cyanosis [No Palpable Aorta] : no palpable aorta [Non Tender] : non-tender [Soft] : abdomen soft [Non-distended] : non-distended [No HSM] : no HSM [No Masses] : no abdominal mass palpated [Normal Bowel Sounds] : normal bowel sounds [Normal Supraclavicular Nodes] : no supraclavicular lymphadenopathy [Normal Anterior Cervical Nodes] : no anterior cervical lymphadenopathy [No CVA Tenderness] : no CVA  tenderness [Normal Posterior Cervical Nodes] : no posterior cervical lymphadenopathy [No Spinal Tenderness] : no spinal tenderness [No Joint Swelling] : no joint swelling [No Rash] : no rash [Grossly Normal Strength/Tone] : grossly normal strength/tone [Normal Gait] : normal gait [Coordination Grossly Intact] : coordination grossly intact [Deep Tendon Reflexes (DTR)] : deep tendon reflexes were 2+ and symmetric [No Focal Deficits] : no focal deficits [Memory Grossly Normal] : memory grossly normal [Speech Grossly Normal] : speech grossly normal [Alert and Oriented x3] : oriented to person, place, and time [Normal Affect] : the affect was normal [Normal Insight/Judgement] : insight and judgment were intact [Normal Mood] : the mood was normal [de-identified] : Incision healing well right abdominal wall status post ventral hernia repair

## 2019-04-16 NOTE — PLAN
[FreeTextEntry1] : Assessment and plan:\par \par There is no medical contraindication for the proposed procedure

## 2019-04-16 NOTE — CONSULT LETTER
[Dear  ___] : Dear ~GAVIN, [Consult Letter:] : I had the pleasure of evaluating your patient, [unfilled]. [Please see my note below.] : Please see my note below. [Consult Closing:] : Thank you very much for allowing me to participate in the care of this patient.  If you have any questions, please do not hesitate to contact me. [Sincerely,] : Sincerely, [FreeTextEntry3] : Marcin

## 2019-04-16 NOTE — COUNSELING
[Behavioral health counseling provided] : behavioral health  [Fall prevention counseling provided] : fall prevention  [Sleep ___ hours/day] : Sleep [unfilled] hours/day [Plan in advance] : Plan in advance [Engage in a relaxing activity] : Engage in a relaxing activity [No throw rugs] : No throw rugs [Adequate lighting] : Adequate lighting [Use recommended devices] : Use recommended devices [Use proper foot wear] : Use proper foot wear [None] : None [Good understanding] : Patient has a good understanding of lifestyle changes and the steps needed to achieve self management goals

## 2019-04-16 NOTE — HISTORY OF PRESENT ILLNESS
[No Pertinent Cardiac History] : no history of aortic stenosis, atrial fibrillation, coronary artery disease, recent myocardial infarction, or implantable device/pacemaker [Aortic Stenosis] : no aortic stenosis [Atrial Fibrillation] : no atrial fibrillation [Coronary Artery Disease] : no coronary artery disease [Recent Myocardial Infarction] : no recent myocardial infarction [No Pertinent Pulmonary History] : no history of asthma, COPD, sleep apnea, or smoking [Implantable Device/Pacemaker] : no implantable device/pacemaker [COPD] : no COPD [Asthma] : no asthma [No Adverse Anesthesia Reaction] : no adverse anesthesia reaction in self or family member [Smoker] : not a smoker [Sleep Apnea] : no sleep apnea [Self] : no previous adverse anesthesia reaction [Family Member] : no family member with adverse anesthesia reaction/sudden death [Chronic Anticoagulation] : no chronic anticoagulation [Diabetes] : no diabetes [Chronic Kidney Disease] : no chronic kidney disease [(Patient denies any chest pain, claudication, dyspnea on exertion, orthopnea, palpitations or syncope)] : Patient denies any chest pain, claudication, dyspnea on exertion, orthopnea, palpitations or syncope [FreeTextEntry1] : Right cataract [FreeTextEntry2] : April 17, 2019 [FreeTextEntry3] : Dr. Jj Sigala [FreeTextEntry4] : This is a 69-year-old female, who presents today for preop evaluation. Patient will be having right cataract surgery on Wednesday, April 17, 2019. Patient recently underwent right abdominal hernia repair and doing extremely well.\par \par Patient is awake, alert, and oriented x3. She is presently in no acute distress. She is calm, cooperative, well-groomed, and nourished. Presently denies any chest pain, shortness of breath, nausea, or vomiting.\par \par Electrocardiogram from her most recent surgery, which was done approximately 2 weeks ago, normal sinus rhythm. No acute ST-T wave changes. [FreeTextEntry7] : Patient recently had electrocardiogram, which was done for her abdominal surgery, which I reviewed showed no acute ST-T wave changes

## 2019-04-16 NOTE — REVIEW OF SYSTEMS
[Fatigue] : fatigue [Joint Pain] : joint pain [Joint Swelling] : no joint swelling [Joint Stiffness] : joint stiffness [Muscle Weakness] : no muscle weakness [Muscle Pain] : no muscle pain [Back Pain] : back pain [Negative] : Psychiatric

## 2019-04-16 NOTE — ASSESSMENT
[Patient Optimized for Surgery] : Patient optimized for surgery [Continue medications as is] : Continue current medications [No Further Testing Recommended] : no further testing recommended [FreeTextEntry4] : Cardiac risk index class II, procedure itself, low risk procedure

## 2019-04-17 ENCOUNTER — OUTPATIENT (OUTPATIENT)
Dept: OUTPATIENT SERVICES | Facility: HOSPITAL | Age: 69
LOS: 1 days | End: 2019-04-17
Payer: MEDICARE

## 2019-04-17 VITALS
DIASTOLIC BLOOD PRESSURE: 82 MMHG | OXYGEN SATURATION: 95 % | RESPIRATION RATE: 16 BRPM | SYSTOLIC BLOOD PRESSURE: 130 MMHG | TEMPERATURE: 98 F | HEART RATE: 64 BPM

## 2019-04-17 VITALS
SYSTOLIC BLOOD PRESSURE: 126 MMHG | DIASTOLIC BLOOD PRESSURE: 66 MMHG | HEIGHT: 61 IN | TEMPERATURE: 99 F | WEIGHT: 166.89 LBS | HEART RATE: 65 BPM | OXYGEN SATURATION: 96 % | RESPIRATION RATE: 14 BRPM

## 2019-04-17 DIAGNOSIS — Z96.653 PRESENCE OF ARTIFICIAL KNEE JOINT, BILATERAL: Chronic | ICD-10-CM

## 2019-04-17 DIAGNOSIS — Z98.890 OTHER SPECIFIED POSTPROCEDURAL STATES: Chronic | ICD-10-CM

## 2019-04-17 DIAGNOSIS — H25.11 AGE-RELATED NUCLEAR CATARACT, RIGHT EYE: ICD-10-CM

## 2019-04-17 DIAGNOSIS — Z98.89 OTHER SPECIFIED POSTPROCEDURAL STATES: Chronic | ICD-10-CM

## 2019-04-17 PROCEDURE — 66984 XCAPSL CTRC RMVL W/O ECP: CPT | Mod: RT,79

## 2019-04-17 PROCEDURE — V2632: CPT

## 2019-04-17 RX ORDER — SODIUM CHLORIDE 9 MG/ML
1000 INJECTION, SOLUTION INTRAVENOUS
Qty: 0 | Refills: 0 | Status: DISCONTINUED | OUTPATIENT
Start: 2019-04-17 | End: 2019-04-17

## 2019-04-17 RX ORDER — PHENYLEPHRINE HCL 2.5 %
1 DROPS OPHTHALMIC (EYE)
Qty: 0 | Refills: 0 | Status: COMPLETED | OUTPATIENT
Start: 2019-04-17 | End: 2019-04-17

## 2019-04-17 RX ORDER — CYCLOPENTOLATE HYDROCHLORIDE 10 MG/ML
1 SOLUTION/ DROPS OPHTHALMIC
Qty: 0 | Refills: 0 | Status: COMPLETED | OUTPATIENT
Start: 2019-04-17 | End: 2019-04-17

## 2019-04-17 RX ORDER — KETOROLAC TROMETHAMINE 0.5 %
1 DROPS OPHTHALMIC (EYE)
Qty: 0 | Refills: 0 | Status: COMPLETED | OUTPATIENT
Start: 2019-04-17 | End: 2019-04-17

## 2019-04-17 RX ORDER — TROPICAMIDE 1 %
1 DROPS OPHTHALMIC (EYE)
Qty: 0 | Refills: 0 | Status: COMPLETED | OUTPATIENT
Start: 2019-04-17 | End: 2019-04-17

## 2019-04-17 RX ADMIN — Medication 1 DROP(S): at 08:49

## 2019-04-17 RX ADMIN — Medication 1 DROP(S): at 08:50

## 2019-04-17 RX ADMIN — Medication 1 DROP(S): at 08:59

## 2019-04-17 RX ADMIN — Medication 1 DROP(S): at 09:14

## 2019-04-17 RX ADMIN — Medication 1 DROP(S): at 08:56

## 2019-04-17 RX ADMIN — CYCLOPENTOLATE HYDROCHLORIDE 1 DROP(S): 10 SOLUTION/ DROPS OPHTHALMIC at 08:49

## 2019-04-17 RX ADMIN — CYCLOPENTOLATE HYDROCHLORIDE 1 DROP(S): 10 SOLUTION/ DROPS OPHTHALMIC at 08:59

## 2019-04-17 RX ADMIN — CYCLOPENTOLATE HYDROCHLORIDE 1 DROP(S): 10 SOLUTION/ DROPS OPHTHALMIC at 08:55

## 2019-04-17 RX ADMIN — Medication 1 DROP(S): at 08:54

## 2019-04-17 NOTE — ASU DISCHARGE PLAN (ADULT/PEDIATRIC) - ASU DC SPECIAL INSTRUCTIONSFT
Keep shield on eye until office visit today at 2:15 pm  Any problems call office at [phone]    OFFICE VISIT TODAY AT 2:15 pm  Bring Drops

## 2019-04-17 NOTE — BRIEF OPERATIVE NOTE - NSICDXBRIEFPOSTOP_GEN_ALL_CORE_FT
Date Performed: 04/08/2018       Time Performed: 12:06:04

 

PTAGE:      63 years

 

EKG:      SINUS TACHYCARDIA WITH OCCASIONAL SUPRAVENTRICULAR PREMATURE COMPLEXES MARKED LEFT AXIS DEV
IATION MODERATE ST DEPRESSION Since the previous tracing, no significant change noted ABNORMAL ECG

 

PREVIOUS TRACING       : 03/11/2018 14.30

 

DOCTOR:   Brittany Steele  Interpretating Date/Time  04/09/2018 15:13:38 POST-OP DIAGNOSIS:  Nuclear senile cataract, right 17-Apr-2019 10:28:32  Jj Sigala

## 2019-04-30 ENCOUNTER — APPOINTMENT (OUTPATIENT)
Dept: PHYSICAL MEDICINE AND REHAB | Facility: CLINIC | Age: 69
End: 2019-04-30
Payer: MEDICARE

## 2019-04-30 VITALS
TEMPERATURE: 98.2 F | HEART RATE: 63 BPM | OXYGEN SATURATION: 94 % | SYSTOLIC BLOOD PRESSURE: 131 MMHG | DIASTOLIC BLOOD PRESSURE: 81 MMHG

## 2019-04-30 PROCEDURE — 20552 NJX 1/MLT TRIGGER POINT 1/2: CPT

## 2019-04-30 PROCEDURE — 99214 OFFICE O/P EST MOD 30 MIN: CPT | Mod: 25

## 2019-05-01 ENCOUNTER — MEDICATION RENEWAL (OUTPATIENT)
Age: 69
End: 2019-05-01

## 2019-05-06 NOTE — REVIEW OF SYSTEMS
[Abdominal Pain] : abdominal pain [Joint Pain] : joint pain [Constipation] : constipation [Easy Bruising] : a tendency for easy bruising [Joint Stiffness] : joint stiffness [Negative] : Neurological

## 2019-05-06 NOTE — ASSESSMENT
[FreeTextEntry1] : Ms. WRIGHT is a 69 year old woman here for evaluation of mid back pain radiating to her abdomen, low back pain. Based on the clinical evaluation, review of available imaging, pain is likely secondary to myofascial pain, right thoracic radiculopathy with partial improvement following BRYSON. Trigger point injection performed today for her low back. Discussed results of surgery and epidural injection. follow up with her general surgeon. continue conservative management, consider repeat BRYSON if radicular pain returns. Follow up in 1-2 months.

## 2019-05-06 NOTE — PROCEDURE
[de-identified] : Reason for procedure: myofascial pain\par \par Procedure: trigger point injection right lumbar paraspinals (2)\par Physician: Dr. Calvo\par Medication injected: 40mg Kenalog, 2cc Lidocaine 1%\par Sedation medications: None\par Estimated blood loss: None\par Complications: None\par \par Technique: The procedure was explained in detail including associated risk and informed consent was obtained. The patient was placed in prone position and the trigger point of the above site were identified. The area was prepped in normal sterile fashion with Chloroprep. A 25 gauge 1.5 inch needle was advanced into the palpable trigger point with reproduction of pain. After negative aspiration of heme, the above medications were injected into the trigger area. Needle was then removed, bandaid placed over injection site. There was no complications, the patient was provided with post injection instructions and noted improvement in pain and ROM after injection.

## 2019-05-08 ENCOUNTER — MEDICATION RENEWAL (OUTPATIENT)
Age: 69
End: 2019-05-08

## 2019-05-08 ENCOUNTER — RX RENEWAL (OUTPATIENT)
Age: 69
End: 2019-05-08

## 2019-05-08 NOTE — H&P PST ADULT - EKG AND INTERPRETATION
W Plasty Text: The lesion was extirpated to the level of the fat with a #15 scalpel blade.  Given the location of the defect, shape of the defect and the proximity to free margins a W-plasty was deemed most appropriate for repair.  Using a sterile surgical marker, the appropriate transposition arms of the W-plasty were drawn incorporating the defect and placing the expected incisions within the relaxed skin tension lines where possible.    The area thus outlined was incised deep to adipose tissue with a #15 scalpel blade.  The skin margins were undermined to an appropriate distance in all directions utilizing iris scissors.  The opposing transposition arms were then transposed into place in opposite direction and anchored with interrupted buried subcutaneous sutures. SB 47 bpm . poor R wave progression in anterior leads

## 2019-05-17 ENCOUNTER — APPOINTMENT (OUTPATIENT)
Dept: FAMILY MEDICINE | Facility: CLINIC | Age: 69
End: 2019-05-17

## 2019-05-31 ENCOUNTER — RX RENEWAL (OUTPATIENT)
Age: 69
End: 2019-05-31

## 2019-06-19 ENCOUNTER — APPOINTMENT (OUTPATIENT)
Dept: PHYSICAL MEDICINE AND REHAB | Facility: CLINIC | Age: 69
End: 2019-06-19
Payer: MEDICARE

## 2019-06-19 VITALS — OXYGEN SATURATION: 95 % | SYSTOLIC BLOOD PRESSURE: 113 MMHG | HEART RATE: 60 BPM | DIASTOLIC BLOOD PRESSURE: 65 MMHG

## 2019-06-19 PROCEDURE — 99214 OFFICE O/P EST MOD 30 MIN: CPT

## 2019-06-21 NOTE — ASSESSMENT
[FreeTextEntry1] : Ms. WRIGHT is a 69 year old woman here for  follow up of low back pain.  Pain likely secondary to lumbar stenosis, spondylosis, with worsening right sided proximal radiculopathy. Lumbar spine trigger point injection provided significant pain relief.  At this time, will order for right L2/3 L3/4 TFESI.  Procedure details, risk and benefits reviewed.  Follow up after procedure.

## 2019-06-21 NOTE — HISTORY OF PRESENT ILLNESS
[FreeTextEntry1] : Ms. EZEQUIEL WRIGHT is a 69 year old female here for follow up for back pain.  Have had BRYSON and trigger point injections which gave her significant relief.  Today she complains of low back pain.\par \par Location: low back\par Inciting Event/Context: no specific inciting event or trauma\par Onset/Timing: constant\par Quality: sharp\par Severity: 8-9/10\par Exacerbating factors: supine, walking, back extension\par Relieving factors: massage\par Radiation: intermittently to right anterior thigh, groin > knee\par Numbness/Tingling:intermittent to bilateral lower shin x 12 years after bilateral TKR\par Bowel/Bladder neurological incontinence: had an episode of bowel incontinence 2 months ago - resolved\par lower ext. weakness: denies\par \par Prior Treatments:\par Physical Therapy: PT without benefit\par Injections: BRYSON with several months of relief\par medication: biofreeze, ibuprofen 800, oxycodone 30 QID PRN\par Imaging: MRI thoracic spine with T11/ 12 neuroforaminal narrowing

## 2019-06-21 NOTE — PHYSICAL EXAM
[FreeTextEntry1] : General: NAD, alert\par Psych: normal mood and affect\par HEENT: NC/AT, normal visual tracking\par Pulmonary: no resp distress, chest expansion appears symmetrical\par CV: extremities are warm and perfused\par Abd: non-distended\par Ext: no c/c/e\par \par Lumbar/Hip Spine:\par Gait: non-antalgic - forward flexed\par Inspection: normal muscle bulk without asymmetry\par Tenderness to palpation: mild TTP over the right upper lumbar paraspinals, PSIS, no TTP over the greater trochanter, sacroiliac joints,  piriformis\par ROM: within functional limits and without pain\par MMT: 5/5 bilateral lower extremities\par Reflexes: depressed right patella reflex, otherwise symmetric bilateral achilles\par Sensory: intact to light touch in all dermatomes of the bilateral lower extremities\par Provocative test:\par Yung - positive\par Reverse SLR - positive

## 2019-06-24 ENCOUNTER — APPOINTMENT (OUTPATIENT)
Dept: FAMILY MEDICINE | Facility: CLINIC | Age: 69
End: 2019-06-24
Payer: MEDICARE

## 2019-06-24 VITALS
SYSTOLIC BLOOD PRESSURE: 108 MMHG | WEIGHT: 165 LBS | DIASTOLIC BLOOD PRESSURE: 60 MMHG | TEMPERATURE: 97.9 F | BODY MASS INDEX: 31.15 KG/M2 | HEIGHT: 61 IN | OXYGEN SATURATION: 95 % | HEART RATE: 58 BPM

## 2019-06-24 DIAGNOSIS — H26.9 UNSPECIFIED CATARACT: ICD-10-CM

## 2019-06-24 DIAGNOSIS — Z87.09 PERSONAL HISTORY OF OTHER DISEASES OF THE RESPIRATORY SYSTEM: ICD-10-CM

## 2019-06-24 PROCEDURE — 99214 OFFICE O/P EST MOD 30 MIN: CPT

## 2019-06-24 NOTE — RESULTS/DATA
[] : not indicated [de-identified] : Patient recently had electrocardiogram done in March of 2019 no acute ST-T wave changes

## 2019-06-24 NOTE — CONSULT LETTER
[Dear  ___] : Dear ~GAVIN, [Consult Letter:] : I had the pleasure of evaluating your patient, [unfilled]. [Please see my note below.] : Please see my note below. [Consult Closing:] : Thank you very much for allowing me to participate in the care of this patient.  If you have any questions, please do not hesitate to contact me. [Sincerely,] : Sincerely,

## 2019-06-24 NOTE — ASSESSMENT
[Ischemic Heart Disease] : Ischemic Heart Disease - No (0) [High Risk Surgery - Intraperitoneal, Intrathoracic or Supringuinal Vascular Procedures] : High Risk Surgery - Intraperitoneal, Intrathoracic or Supringuinal Vascular Procedures - No (0) [Prior Cerebrovascular Accident or TIA] : Prior Cerebrovascular Accident or TIA - No (0) [Congestive Heart Failure] : Congestive Heart Failure - No (0) [Creatinine >= 2mg/dL (1 Point)] : Creatinine >= 2mg/dL - No (0) [Insulin-dependent Diabetic (1 Point)] : Insulin-dependent Diabetic - No (0) [0] : 0 , RCRI Class: I, Risk of Post-Op Cardiac Complications: 0.4%, Procedure Risk: Low-Risk [Patient Optimized for Surgery] : Patient optimized for surgery [No Further Testing Recommended] : no further testing recommended [Continue medications as is] : Continue current medications [FreeTextEntry4] : Cardiac risk index class II, procedure itself, low risk procedure

## 2019-06-24 NOTE — HISTORY OF PRESENT ILLNESS
[No Pertinent Cardiac History] : no history of aortic stenosis, atrial fibrillation, coronary artery disease, recent myocardial infarction, or implantable device/pacemaker [No Pertinent Pulmonary History] : no history of asthma, COPD, sleep apnea, or smoking [No Adverse Anesthesia Reaction] : no adverse anesthesia reaction in self or family member [Atrial Fibrillation] : no atrial fibrillation [Aortic Stenosis] : no aortic stenosis [Implantable Device/Pacemaker] : no implantable device/pacemaker [Recent Myocardial Infarction] : no recent myocardial infarction [Coronary Artery Disease] : no coronary artery disease [Asthma] : no asthma [COPD] : no COPD [Sleep Apnea] : no sleep apnea [Smoker] : not a smoker [Family Member] : no family member with adverse anesthesia reaction/sudden death [Self] : no previous adverse anesthesia reaction [Chronic Anticoagulation] : no chronic anticoagulation [Diabetes] : no diabetes [Chronic Kidney Disease] : no chronic kidney disease [FreeTextEntry1] : Left cataract [FreeTextEntry3] : Dr. Jj Sigala [FreeTextEntry4] : Patient is a 72-year-old female, who presents today for preop clearance. Patient will be having left cataract surgery on Wednesday, June 26, 2019 medical history is significant for hypothyroidism, hypertension, hyperlipidemia.\par \par Patient is awake, alert, and oriented x3. She is in no acute distress, cooperative, well-groomed, and nourished. Presently denies any chest pain, shortness of breath, nausea, or vomiting. [FreeTextEntry2] : June 26, 2019

## 2019-06-24 NOTE — PHYSICAL EXAM
[No Acute Distress] : no acute distress [Well Nourished] : well nourished [Well-Appearing] : well-appearing [Well Developed] : well developed [Normal Voice/Communication] : normal voice/communication [PERRL] : pupils equal round and reactive to light [EOMI] : extraocular movements intact [Normal Sclera/Conjunctiva] : normal sclera/conjunctiva [Normal Oropharynx] : the oropharynx was normal [Normal Outer Ear/Nose] : the outer ears and nose were normal in appearance [No JVD] : no jugular venous distention [Supple] : supple [Normal Nasal Mucosa] : the nasal mucosa was normal [Normal TMs] : both tympanic membranes were normal [Thyroid Normal, No Nodules] : the thyroid was normal and there were no nodules present [No Respiratory Distress] : no respiratory distress  [No Lymphadenopathy] : no lymphadenopathy [Clear to Auscultation] : lungs were clear to auscultation bilaterally [No Accessory Muscle Use] : no accessory muscle use [Normal Rate] : normal rate  [Regular Rhythm] : with a regular rhythm [Normal S1, S2] : normal S1 and S2 [No Murmur] : no murmur heard [No Carotid Bruits] : no carotid bruits [No Abdominal Bruit] : a ~M bruit was not heard ~T in the abdomen [No Varicosities] : no varicosities [No Edema] : there was no peripheral edema [Pedal Pulses Present] : the pedal pulses are present [No Extremity Clubbing/Cyanosis] : no extremity clubbing/cyanosis [Soft] : abdomen soft [No Palpable Aorta] : no palpable aorta [Non-distended] : non-distended [No HSM] : no HSM [No Masses] : no abdominal mass palpated [Non Tender] : non-tender [Normal Bowel Sounds] : normal bowel sounds [Normal Posterior Cervical Nodes] : no posterior cervical lymphadenopathy [Normal Supraclavicular Nodes] : no supraclavicular lymphadenopathy [No CVA Tenderness] : no CVA  tenderness [Normal Anterior Cervical Nodes] : no anterior cervical lymphadenopathy [No Spinal Tenderness] : no spinal tenderness [Grossly Normal Strength/Tone] : grossly normal strength/tone [No Joint Swelling] : no joint swelling [No Rash] : no rash [Coordination Grossly Intact] : coordination grossly intact [No Focal Deficits] : no focal deficits [Normal Gait] : normal gait [Deep Tendon Reflexes (DTR)] : deep tendon reflexes were 2+ and symmetric [Speech Grossly Normal] : speech grossly normal [Memory Grossly Normal] : memory grossly normal [Normal Affect] : the affect was normal [Alert and Oriented x3] : oriented to person, place, and time [Normal Mood] : the mood was normal [Normal Insight/Judgement] : insight and judgment were intact

## 2019-06-24 NOTE — REVIEW OF SYSTEMS
[Fatigue] : fatigue [Joint Stiffness] : joint stiffness [Joint Pain] : joint pain [Joint Swelling] : no joint swelling [Muscle Weakness] : no muscle weakness [Muscle Pain] : no muscle pain [Back Pain] : back pain [Negative] : Heme/Lymph

## 2019-06-25 ENCOUNTER — TRANSCRIPTION ENCOUNTER (OUTPATIENT)
Age: 69
End: 2019-06-25

## 2019-06-25 VITALS
TEMPERATURE: 98 F | DIASTOLIC BLOOD PRESSURE: 73 MMHG | SYSTOLIC BLOOD PRESSURE: 131 MMHG | RESPIRATION RATE: 18 BRPM | OXYGEN SATURATION: 97 % | HEIGHT: 61 IN | HEART RATE: 56 BPM | WEIGHT: 164.91 LBS

## 2019-06-25 NOTE — ASU PATIENT PROFILE, ADULT - PMH
COPD, mild    GERD (gastroesophageal reflux disease)    Gout    Heart murmur    HTN (hypertension)    Hyperlipidemia    Hypothyroidism    Kidney calculi    Lumbar spinal stenosis    OA (osteoarthritis)    Osteoporosis    Pulmonary embolism  1974  due to  immobilty s/p fall and right knee injury  Rheumatic fever  At 17 years old  Scoliosis

## 2019-06-26 ENCOUNTER — OUTPATIENT (OUTPATIENT)
Dept: OUTPATIENT SERVICES | Facility: HOSPITAL | Age: 69
LOS: 1 days | End: 2019-06-26
Payer: MEDICARE

## 2019-06-26 VITALS
SYSTOLIC BLOOD PRESSURE: 140 MMHG | RESPIRATION RATE: 16 BRPM | DIASTOLIC BLOOD PRESSURE: 68 MMHG | HEART RATE: 68 BPM | TEMPERATURE: 97 F

## 2019-06-26 DIAGNOSIS — Z98.890 OTHER SPECIFIED POSTPROCEDURAL STATES: Chronic | ICD-10-CM

## 2019-06-26 DIAGNOSIS — H25.12 AGE-RELATED NUCLEAR CATARACT, LEFT EYE: ICD-10-CM

## 2019-06-26 DIAGNOSIS — Z98.89 OTHER SPECIFIED POSTPROCEDURAL STATES: Chronic | ICD-10-CM

## 2019-06-26 DIAGNOSIS — Z96.653 PRESENCE OF ARTIFICIAL KNEE JOINT, BILATERAL: Chronic | ICD-10-CM

## 2019-06-26 PROBLEM — M81.0 AGE-RELATED OSTEOPOROSIS WITHOUT CURRENT PATHOLOGICAL FRACTURE: Chronic | Status: ACTIVE | Noted: 2019-06-25

## 2019-06-26 PROCEDURE — V2632: CPT

## 2019-06-26 PROCEDURE — 66984 XCAPSL CTRC RMVL W/O ECP: CPT | Mod: LT,79

## 2019-06-26 RX ORDER — KETOROLAC TROMETHAMINE 0.5 %
1 DROPS OPHTHALMIC (EYE)
Refills: 0 | Status: COMPLETED | OUTPATIENT
Start: 2019-06-26 | End: 2019-06-26

## 2019-06-26 RX ORDER — TROPICAMIDE 1 %
1 DROPS OPHTHALMIC (EYE)
Refills: 0 | Status: COMPLETED | OUTPATIENT
Start: 2019-06-26 | End: 2019-06-26

## 2019-06-26 RX ORDER — CYCLOPENTOLATE HYDROCHLORIDE 10 MG/ML
1 SOLUTION/ DROPS OPHTHALMIC
Refills: 0 | Status: COMPLETED | OUTPATIENT
Start: 2019-06-26 | End: 2019-06-26

## 2019-06-26 RX ORDER — PHENYLEPHRINE HCL 2.5 %
1 DROPS OPHTHALMIC (EYE)
Refills: 0 | Status: COMPLETED | OUTPATIENT
Start: 2019-06-26 | End: 2019-06-26

## 2019-06-26 RX ORDER — CHOLECALCIFEROL (VITAMIN D3) 125 MCG
1 CAPSULE ORAL
Qty: 0 | Refills: 0 | DISCHARGE

## 2019-06-26 RX ORDER — ASPIRIN/CALCIUM CARB/MAGNESIUM 324 MG
1 TABLET ORAL
Qty: 0 | Refills: 0 | DISCHARGE

## 2019-06-26 RX ORDER — NYSTATIN 500MM UNIT
4 POWDER (EA) MISCELLANEOUS
Qty: 0 | Refills: 0 | DISCHARGE

## 2019-06-26 RX ORDER — VALACYCLOVIR 500 MG/1
1 TABLET, FILM COATED ORAL
Qty: 0 | Refills: 0 | DISCHARGE

## 2019-06-26 RX ORDER — POTASSIUM CHLORIDE 20 MEQ
1 PACKET (EA) ORAL
Qty: 0 | Refills: 0 | DISCHARGE

## 2019-06-26 RX ORDER — SODIUM CHLORIDE 9 MG/ML
1000 INJECTION, SOLUTION INTRAVENOUS
Refills: 0 | Status: DISCONTINUED | OUTPATIENT
Start: 2019-06-26 | End: 2019-06-26

## 2019-06-26 RX ORDER — ZOLPIDEM TARTRATE 10 MG/1
1 TABLET ORAL
Qty: 0 | Refills: 0 | DISCHARGE

## 2019-06-26 RX ADMIN — Medication 1 DROP(S): at 08:18

## 2019-06-26 RX ADMIN — Medication 1 DROP(S): at 08:22

## 2019-06-26 RX ADMIN — SODIUM CHLORIDE 40 MILLILITER(S): 9 INJECTION, SOLUTION INTRAVENOUS at 08:31

## 2019-06-26 RX ADMIN — CYCLOPENTOLATE HYDROCHLORIDE 1 DROP(S): 10 SOLUTION/ DROPS OPHTHALMIC at 08:18

## 2019-06-26 RX ADMIN — Medication 1 DROP(S): at 08:19

## 2019-06-26 RX ADMIN — Medication 1 DROP(S): at 08:12

## 2019-06-26 RX ADMIN — Medication 1 DROP(S): at 08:13

## 2019-06-26 RX ADMIN — Medication 1 DROP(S): at 08:23

## 2019-06-26 RX ADMIN — CYCLOPENTOLATE HYDROCHLORIDE 1 DROP(S): 10 SOLUTION/ DROPS OPHTHALMIC at 08:12

## 2019-06-26 RX ADMIN — CYCLOPENTOLATE HYDROCHLORIDE 1 DROP(S): 10 SOLUTION/ DROPS OPHTHALMIC at 08:22

## 2019-06-26 NOTE — ASU DISCHARGE PLAN (ADULT/PEDIATRIC) - ASU DC SPECIAL INSTRUCTIONSFT
Keep shield on eye until office visit today at 2 pm  Any problems call office at [phone]    OFFICE VISIT TODAY AT 2 pm  Bring Drops

## 2019-06-26 NOTE — PRE-ANESTHESIA EVALUATION ADULT - NSANTHADDINFOFT_GEN_ALL_CORE
Discussed MAC/IVS in detail with patient and all questions sought and answered. Pt. agrees to all plans and wishes to proceed with surgical care. Discussed MAC/IVS in detail with patient and all questions sought and answered. Pt. agrees to all plans and wishes to proceed with surgical care.    Medical evaluation/optimization and clearance noted and appreciated.

## 2019-06-26 NOTE — ASU DISCHARGE PLAN (ADULT/PEDIATRIC) - NURSING INSTRUCTIONS
All discharge instructions reviewed with patient including pain, safety and medications and follow up care.

## 2019-07-02 ENCOUNTER — APPOINTMENT (OUTPATIENT)
Dept: PHYSICAL MEDICINE AND REHAB | Facility: CLINIC | Age: 69
End: 2019-07-02

## 2019-07-02 ENCOUNTER — OUTPATIENT (OUTPATIENT)
Dept: OUTPATIENT SERVICES | Facility: HOSPITAL | Age: 69
LOS: 1 days | End: 2019-07-02
Payer: SELF-PAY

## 2019-07-02 DIAGNOSIS — Z98.89 OTHER SPECIFIED POSTPROCEDURAL STATES: Chronic | ICD-10-CM

## 2019-07-02 DIAGNOSIS — Z96.653 PRESENCE OF ARTIFICIAL KNEE JOINT, BILATERAL: Chronic | ICD-10-CM

## 2019-07-02 DIAGNOSIS — M54.16 RADICULOPATHY, LUMBAR REGION: ICD-10-CM

## 2019-07-02 DIAGNOSIS — Z98.890 OTHER SPECIFIED POSTPROCEDURAL STATES: Chronic | ICD-10-CM

## 2019-07-02 PROCEDURE — 64484 NJX AA&/STRD TFRM EPI L/S EA: CPT

## 2019-07-02 PROCEDURE — 64483 NJX AA&/STRD TFRM EPI L/S 1: CPT

## 2019-07-22 ENCOUNTER — MEDICATION RENEWAL (OUTPATIENT)
Age: 69
End: 2019-07-22

## 2019-08-05 ENCOUNTER — RX RENEWAL (OUTPATIENT)
Age: 69
End: 2019-08-05

## 2019-08-07 ENCOUNTER — APPOINTMENT (OUTPATIENT)
Dept: FAMILY MEDICINE | Facility: CLINIC | Age: 69
End: 2019-08-07
Payer: MEDICARE

## 2019-08-07 ENCOUNTER — RX RENEWAL (OUTPATIENT)
Age: 69
End: 2019-08-07

## 2019-08-07 VITALS
HEIGHT: 61 IN | HEART RATE: 55 BPM | DIASTOLIC BLOOD PRESSURE: 74 MMHG | BODY MASS INDEX: 31.72 KG/M2 | OXYGEN SATURATION: 96 % | SYSTOLIC BLOOD PRESSURE: 132 MMHG | TEMPERATURE: 97.6 F | WEIGHT: 168 LBS

## 2019-08-07 DIAGNOSIS — E66.9 OBESITY, UNSPECIFIED: ICD-10-CM

## 2019-08-07 PROCEDURE — 99214 OFFICE O/P EST MOD 30 MIN: CPT

## 2019-08-07 RX ORDER — DILTIAZEM HYDROCHLORIDE 240 MG/1
240 CAPSULE, EXTENDED RELEASE ORAL
Qty: 90 | Refills: 0 | Status: COMPLETED | COMMUNITY
Start: 2019-04-16 | End: 2019-08-07

## 2019-08-07 RX ORDER — PREDNISONE 1 MG/1
1 TABLET ORAL DAILY
Qty: 100 | Refills: 0 | Status: COMPLETED | COMMUNITY
Start: 2017-08-31 | End: 2019-08-07

## 2019-08-07 RX ORDER — AMOXICILLIN AND CLAVULANATE POTASSIUM 500; 125 MG/1; MG/1
500-125 TABLET, FILM COATED ORAL
Qty: 14 | Refills: 0 | Status: COMPLETED | COMMUNITY
Start: 2019-03-05 | End: 2019-08-07

## 2019-08-07 NOTE — ASSESSMENT
[FreeTextEntry1] : Assessment and plan:\par \par 1. Bright red rectal bleeding with history of hemorrhoids. Patient presently on nonsteroidal anti-inflammatory drug ibuprofen 800 mg, which can worsen rectal bleeding, especially if his hemorrhoidal I will attempt rectal cream, sitz baths, and hold anti-inflammatories for now and for completeness sake. Gastroenterology evaluation release for sigmoidoscopy.\par \par 2. Hypertension. Blood pressure control is good. Continue present medical management with diltiazem 240 mg p.o. daily, enalapril 2.5 mg, furosemide 20 times.\par \par 3. Hyperlipidemia. Continue low-fat, low-cholesterol diet, and lovastatin 20 mg p.o. daily at bedtime.\par \par 4. Hypothyroidism levothyroxin 125 mcg.

## 2019-08-07 NOTE — HEALTH RISK ASSESSMENT
[No] : In the past 12 months have you used drugs other than those required for medical reasons? No [No falls in past year] : Patient reported no falls in the past year [0] : 1) Little interest or pleasure doing things: Not at all (0) [] : No [Audit-CScore] : 0 [[id number]] : 0

## 2019-08-07 NOTE — HISTORY OF PRESENT ILLNESS
[FreeTextEntry1] : Rectal bleeding occurs at least twice a month at times heavy. Patient states that there is nothing that she does that makes it worse or anything that improves it comes on its own and it resolves on its own. Patient does have history of hemorrhoids [de-identified] : A 69-year-old female, who presents today for followup and disease management. We will be addressing hypothyroidism, hypertension, hyperlipidemia, elevated BMI.\par \par Chief complaint recurrent rectal bleeding at times heavy painless. Patient does have history of hemorrhoids. Most recent colonoscopy 2017 for completeness sake, I recommend a followup colonoscopy.

## 2019-08-07 NOTE — PHYSICAL EXAM
[Well Nourished] : well nourished [Well Developed] : well developed [Normal Voice/Communication] : normal voice/communication [No Abdominal Bruit] : a ~M bruit was not heard ~T in the abdomen [No Varicosities] : no varicosities [No Carotid Bruits] : no carotid bruits [Pedal Pulses Present] : the pedal pulses are present [No Edema] : there was no peripheral edema [No Palpable Aorta] : no palpable aorta [No Extremity Clubbing/Cyanosis] : no extremity clubbing/cyanosis [Normal] : affect was normal and insight and judgment were intact [de-identified] : Eyesight much improved after cataract surgery [de-identified] : No guarding or rebound [de-identified] : L/S spasm [de-identified] : diffuse joint pain without swelling

## 2019-08-07 NOTE — REVIEW OF SYSTEMS
[Fatigue] : fatigue [Joint Pain] : joint pain [Joint Stiffness] : joint stiffness [Back Pain] : back pain [Negative] : Heme/Lymph [Abdominal Pain] : no abdominal pain [Nausea] : no nausea [Constipation] : no constipation [Diarrhea] : diarrhea [Vomiting] : no vomiting [Heartburn] : no heartburn [Melena] : no melena [Joint Swelling] : no joint swelling [Muscle Weakness] : no muscle weakness [Muscle Pain] : no muscle pain [FreeTextEntry7] : Bright red blood rectal bleeding

## 2019-09-03 ENCOUNTER — APPOINTMENT (OUTPATIENT)
Dept: PHYSICAL MEDICINE AND REHAB | Facility: CLINIC | Age: 69
End: 2019-09-03
Payer: MEDICARE

## 2019-09-03 VITALS — SYSTOLIC BLOOD PRESSURE: 104 MMHG | OXYGEN SATURATION: 92 % | HEART RATE: 171 BPM | DIASTOLIC BLOOD PRESSURE: 63 MMHG

## 2019-09-03 PROCEDURE — 99214 OFFICE O/P EST MOD 30 MIN: CPT

## 2019-09-06 NOTE — HISTORY OF PRESENT ILLNESS
[FreeTextEntry1] : Ms. EZEQUIEL WRIGHT is a 69 year old female here for follow up for back pain.  She has a history of lumbar stenosis and spondylosis now s/p TFESI which has provided her significant relief.   Today her pain is localized in the mid back on the R radiating to her abdomen. She has had similar pain in the past when she initially established care at the clinic.  Denies trauma or new rashes, fevers, chills. \par Patient does have a history of ventral abdominal hernia and abdominal bulge in the area of pain which was surgically repaired in April.  She states since the pain was in the area of her hernia she visited the surgeon last week who believes her pain is not due to the hernia.  She does state she gets some relief with bowel movements.   She also has history of intermittent non-obstructive renal stones on R being followed by urology. \par \par Location: R mid back/thoracic/ flank pain radiating to anterior abdomen\par Inciting Event:  Years. Improved for a while then returned over the past few weeks. \par Onset/timing: constant\par Quality: sharp/ burning\par Severity: 5/10 \par Exacerbating Factor: with activity \par Relieving factor: gabapentin and oxycodone, at times with BM\par Radiation: to upper abdomen \par Numbness/Tingling: yes\par Bowel/Bladder incontinence: denies\par Extremity weakness:denies\par \par Prior Treatments\par Injections: only to lumbar spine\par Surgery: no prior spine surgery\par PT/OT: trialed with minimal relief\par Chiro/acupuncture: none\par Medications: Ibuprofen, oxycodone, gabapentin\par Images: MRI thoracic spine in 2017 with T11/12 foraminal narrowing

## 2019-09-06 NOTE — ASSESSMENT
[FreeTextEntry1] : Ms. WRIGHT is a 69 year old woman here for  follow up of thoracic back/flank pain. Based on clinical evaluation and review of available imaging, pain is likely secondary to thoracic radiculitis in the setting of known neuroforaminal narrowing in adjacent areas on MRI.  She did have a recent abdominal hernia repair in the R subcostal area, however it does not appear that pain is visceral in nature nor is it associated with abdominal pain. Of note, there is potential that this pain is secondary to renal colic to which she continues to be followed by urology.  \par \par Plan: \par - Plan for TFESI to T12/L1. Procedure risk and benefits were discussed with the patient\par - Follow up with \par - Follow up for procedure

## 2019-09-06 NOTE — PHYSICAL EXAM
[FreeTextEntry1] : General: NAD, alert\par Psych: normal mood and affect\par HEENT: NC/AT, normal visual tracking\par Pulmonary: no resp distress, chest expansion appears symmetrical\par CV: extremities are warm and perfused\par Abd: subcostal R abdominal hernia, non TTP. Abdominal scar noted. \par Ext: no c/c/e\par \par Lumbar/Hip Spine:\par Gait: non-antalgic - forward flexed\par Inspection: normal muscle bulk without asymmetry\par Tenderness to palpation: mild TTP over the right upper lumbar and lower thoracic paraspinals on R, PSIS, no TTP over the greater trochanter, sacroiliac joints,  piriformis\par ROM: within functional limits and without pain\par MMT: 5/5 bilateral lower extremities\par Reflexes: depressed right patella reflex, otherwise symmetric bilateral achilles\par Sensory: intact to light touch in all dermatomes of the bilateral lower extremities. No sensory changes to abdomen and surrounding scar.\par \par Provocative test:\par Yung - positive on R

## 2019-09-06 NOTE — REVIEW OF SYSTEMS
[Abdominal Pain] : abdominal pain [Constipation] : constipation [Joint Pain] : joint pain [Joint Stiffness] : joint stiffness [Negative] : Heme/Lymph [Easy Bruising] : no tendency for easy bruising

## 2019-09-17 ENCOUNTER — APPOINTMENT (OUTPATIENT)
Dept: PHYSICAL MEDICINE AND REHAB | Facility: CLINIC | Age: 69
End: 2019-09-17

## 2019-09-17 ENCOUNTER — OUTPATIENT (OUTPATIENT)
Dept: OUTPATIENT SERVICES | Facility: HOSPITAL | Age: 69
LOS: 1 days | End: 2019-09-17
Payer: MEDICARE

## 2019-09-17 DIAGNOSIS — Z98.89 OTHER SPECIFIED POSTPROCEDURAL STATES: Chronic | ICD-10-CM

## 2019-09-17 DIAGNOSIS — M54.14 RADICULOPATHY, THORACIC REGION: ICD-10-CM

## 2019-09-17 DIAGNOSIS — Z98.890 OTHER SPECIFIED POSTPROCEDURAL STATES: Chronic | ICD-10-CM

## 2019-09-17 DIAGNOSIS — M54.16 RADICULOPATHY, LUMBAR REGION: ICD-10-CM

## 2019-09-17 DIAGNOSIS — Z96.653 PRESENCE OF ARTIFICIAL KNEE JOINT, BILATERAL: Chronic | ICD-10-CM

## 2019-09-17 PROCEDURE — 64479 NJX AA&/STRD TFRM EPI C/T 1: CPT

## 2019-09-17 PROCEDURE — 64483 NJX AA&/STRD TFRM EPI L/S 1: CPT

## 2019-09-18 PROBLEM — M54.14 THORACIC RADICULITIS: Status: ACTIVE | Noted: 2017-09-30

## 2019-09-19 ENCOUNTER — APPOINTMENT (OUTPATIENT)
Dept: FAMILY MEDICINE | Facility: CLINIC | Age: 69
End: 2019-09-19
Payer: MEDICARE

## 2019-09-19 VITALS
TEMPERATURE: 97.6 F | BODY MASS INDEX: 31.15 KG/M2 | OXYGEN SATURATION: 99 % | DIASTOLIC BLOOD PRESSURE: 70 MMHG | HEIGHT: 61 IN | RESPIRATION RATE: 14 BRPM | SYSTOLIC BLOOD PRESSURE: 120 MMHG | WEIGHT: 165 LBS | HEART RATE: 57 BPM

## 2019-09-19 PROCEDURE — G0008: CPT

## 2019-09-19 PROCEDURE — 90653 IIV ADJUVANT VACCINE IM: CPT

## 2019-09-19 PROCEDURE — 93000 ELECTROCARDIOGRAM COMPLETE: CPT

## 2019-09-19 PROCEDURE — 99214 OFFICE O/P EST MOD 30 MIN: CPT | Mod: 25

## 2019-09-19 NOTE — HEALTH RISK ASSESSMENT
[No] : In the past 12 months have you used drugs other than those required for medical reasons? No [No falls in past year] : Patient reported no falls in the past year [0] : 2) Feeling down, depressed, or hopeless: Not at all (0) [] : No [Audit-CScore] : 0 [[id number]] : 0

## 2019-09-19 NOTE — REVIEW OF SYSTEMS
[Fatigue] : fatigue [Chest Pain] : chest pain [Palpitations] : palpitations [Joint Pain] : joint pain [Joint Stiffness] : joint stiffness [Back Pain] : back pain [Negative] : Heme/Lymph [Leg Claudication] : no leg claudication [Lower Ext Edema] : no lower extremity edema [Orthopnea] : no orthopnea [Paroysmal Nocturnal Dyspnea] : no paroysmal nocturnal dyspnea [Abdominal Pain] : no abdominal pain [Nausea] : no nausea [Constipation] : no constipation [Diarrhea] : diarrhea [Vomiting] : no vomiting [Heartburn] : no heartburn [Melena] : no melena [Joint Swelling] : no joint swelling [Muscle Weakness] : no muscle weakness [Muscle Pain] : no muscle pain [FreeTextEntry7] : Bright red blood rectal bleeding

## 2019-09-19 NOTE — PHYSICAL EXAM
[Well Nourished] : well nourished [Well Developed] : well developed [Normal Voice/Communication] : normal voice/communication [No Carotid Bruits] : no carotid bruits [No Abdominal Bruit] : a ~M bruit was not heard ~T in the abdomen [No Varicosities] : no varicosities [Pedal Pulses Present] : the pedal pulses are present [No Edema] : there was no peripheral edema [No Palpable Aorta] : no palpable aorta [No Extremity Clubbing/Cyanosis] : no extremity clubbing/cyanosis [Normal] : affect was normal and insight and judgment were intact [de-identified] : No guarding or rebound [FreeTextEntry1] : GI [de-identified] : L/S spasm [de-identified] : diffuse joint pain without swelling

## 2019-09-19 NOTE — HISTORY OF PRESENT ILLNESS
[FreeTextEntry1] : Chest pain, atypical, sternal border, resolved,  was reproducible with palpation of chest wall. Patient admits to diaphoresis and shortness of breath. Pain lasted 4 weeks and resolved on its own. Presently, chest pain-free [de-identified] : Patient is a 69-year-old female, who presents today for followup appointment. Chief complaint retrosternal chest pain, atypical in nature, lasted for several weeks. Presently, patient states that the pain resolved on its own. Presently, the patient is chest pain-free. She denies any neck, arm or jaw discomfort, and presently denies any diaphoresis, or shortness of breath. Patient does admit that the chest pain when he did come she would become diaphoretic and short of breath.\par \par Medical history is significant for hypothyroidism, degenerative joint disease, hypertension, and elevated BMI.\par \par Patient admits to occasional rectal bleeding. Has not seen gastroenterology yet. Patient will make appointment with gastroenterologist. She does have a history of hemorrhoids.

## 2019-09-19 NOTE — ASSESSMENT
[FreeTextEntry1] : Assessment and plan:\par \par 1. Atypical chest pain. Physical exam unremarkable. Electrocardiogram normal sinus rhythm. No acute ST-T wave changes. For completeness sake, I recommend cardiology workup, most likely it was secondary to a costochondritis, which resolved on its own.\par \par 2. Hypertension good blood pressure control. Continue diltiazem, enalapril furosemide.\par \par 3. Hyperlipidemia. Patient tolerating and doing well with lovastatin 20 mg p.o. at bedtime. Continue low-fat, low-cholesterol diet.\par \par 4. Hypothyroidism. Continue levothyroxine 125 mcg p.o. daily.\par \par Continue present medical management without change. High-dose influenza vaccine administered. Recommend followup with gastroenterology regarding paroxysmal rectal bleeding, which comes and goes most likely hemorrhoidal, but I still recommend gastrointestinal workup.

## 2019-09-19 NOTE — COUNSELING
[Fall prevention counseling provided] : Fall prevention counseling provided [Adequate lighting] : Adequate lighting [No throw rugs] : No throw rugs [Use proper foot wear] : Use proper foot wear [Behavioral health counseling provided] : Behavioral health counseling provided [Sleep ___ hours/day] : Sleep [unfilled] hours/day [Engage in a relaxing activity] : Engage in a relaxing activity [Plan in advance] : Plan in advance [AUDIT-C Screening administered and reviewed] : AUDIT-C Screening administered and reviewed [Potential consequences of obesity discussed] : Potential consequences of obesity discussed [Benefits of weight loss discussed] : Benefits of weight loss discussed [Structured Weight Management Program suggested:] : Structured weight management program suggested [Encouraged to maintain food diary] : Encouraged to maintain food diary [Encouraged to increase physical activity] : Encouraged to increase physical activity [Encouraged to use exercise tracking device] : Encouraged to use exercise tracking device [Target Wt Loss Goal ___] : Weight Loss Goals: Target weight loss goal [unfilled] lbs [Weigh Self Weekly] : weigh self weekly [Decrease Portions] : decrease portions [____ min/wk Activity] : [unfilled] min/wk activity [Keep Food Diary] : keep food diary [None] : None [Good understanding] : Patient has a good understanding of lifestyle changes and steps needed to achieve self management goal

## 2019-09-23 DIAGNOSIS — M54.15 RADICULOPATHY, THORACOLUMBAR REGION: ICD-10-CM

## 2019-09-25 ENCOUNTER — APPOINTMENT (OUTPATIENT)
Dept: CARDIOLOGY | Facility: CLINIC | Age: 69
End: 2019-09-25
Payer: MEDICARE

## 2019-09-25 ENCOUNTER — NON-APPOINTMENT (OUTPATIENT)
Age: 69
End: 2019-09-25

## 2019-09-25 VITALS
DIASTOLIC BLOOD PRESSURE: 80 MMHG | BODY MASS INDEX: 31.72 KG/M2 | SYSTOLIC BLOOD PRESSURE: 131 MMHG | HEIGHT: 61 IN | HEART RATE: 55 BPM | RESPIRATION RATE: 15 BRPM | WEIGHT: 168 LBS | OXYGEN SATURATION: 96 %

## 2019-09-25 VITALS — SYSTOLIC BLOOD PRESSURE: 120 MMHG | DIASTOLIC BLOOD PRESSURE: 80 MMHG | HEART RATE: 56 BPM

## 2019-09-25 PROCEDURE — 99214 OFFICE O/P EST MOD 30 MIN: CPT

## 2019-09-25 PROCEDURE — 93000 ELECTROCARDIOGRAM COMPLETE: CPT

## 2019-09-25 NOTE — REASON FOR VISIT
[Follow-Up - Clinic] : a clinic follow-up of [Chest Pain] : chest pain [FreeTextEntry1] : Patient returns for reevaluation. She has not been seen here for several years. She is concerned because several weeks ago she developed occasional chest pains lasting approximately 5 minutes there were on the left side of her chest and under her breast she we've seen him in worse with palpation there were not associated with exertion not associated with radiation to the neck back or arms. Not associated with shortness of breath. She is quite concerned however because all 4 of her older siblings had stents and  of coronary related illnesses by the age of 68. The patient does have hypertension and hyperlipidemia. He had a normal stress test 3 years ago and had a fair workload.She states she is quite upset and worried that she may have the same fate as her siblings

## 2019-09-25 NOTE — PHYSICAL EXAM
[General Appearance - Well Developed] : well developed [Normal Appearance] : normal appearance [Well Groomed] : well groomed [General Appearance - Well Nourished] : well nourished [No Deformities] : no deformities [General Appearance - In No Acute Distress] : no acute distress [Normal Oral Mucosa] : normal oral mucosa [No Oral Pallor] : no oral pallor [No Oral Cyanosis] : no oral cyanosis [Normal Jugular Venous A Waves Present] : normal jugular venous A waves present [Normal Jugular Venous V Waves Present] : normal jugular venous V waves present [No Jugular Venous Triana A Waves] : no jugular venous triana A waves [Exaggerated Use Of Accessory Muscles For Inspiration] : no accessory muscle use [Respiration, Rhythm And Depth] : normal respiratory rhythm and effort [Auscultation Breath Sounds / Voice Sounds] : lungs were clear to auscultation bilaterally [Abdomen Soft] : soft [Abdomen Tenderness] : non-tender [Abdomen Mass (___ Cm)] : no abdominal mass palpated [Abnormal Walk] : normal gait [Gait - Sufficient For Exercise Testing] : the gait was sufficient for exercise testing [Cyanosis, Localized] : no localized cyanosis [Nail Clubbing] : no clubbing of the fingernails [Petechial Hemorrhages (___cm)] : no petechial hemorrhages [Skin Color & Pigmentation] : normal skin color and pigmentation [No Venous Stasis] : no venous stasis [] : no rash [Skin Lesions] : no skin lesions [No Xanthoma] : no  xanthoma was observed [No Skin Ulcers] : no skin ulcer [Oriented To Time, Place, And Person] : oriented to person, place, and time [Mood] : the mood was normal [Affect] : the affect was normal [No Anxiety] : not feeling anxious [5th Left ICS - MCL] : palpated at the 5th LICS in the midclavicular line [Normal] : normal [Normal Rate] : normal [Rhythm Regular] : regular [Normal S1] : normal S1 [Normal S2] : normal S2 [S3] : no S3 [S4] : no S4 [II] : a grade 2 [Right Carotid Bruit] : no bruit heard over the right carotid [Left Carotid Bruit] : no bruit heard over the left carotid [Right Femoral Bruit] : no bruit heard over the right femoral artery [Left Femoral Bruit] : no bruit heard over the left femoral artery [2+] : left 2+ [No Abnormalities] : the abdominal aorta was not enlarged and no bruit was heard [No Pitting Edema] : no pitting edema present

## 2019-09-25 NOTE — ASSESSMENT
[FreeTextEntry1] : In summary, the patient is a 69-year-old woman with 3 known risk factors for heart disease and atypical chest pain. Amongst her risk factors are a family history of 4 siblings dying of coronary disease right around the age the patient is approaching. She is understandably quite upset about this.\par \par I have advised her to undergo cardiac CTA to assess her underlying disease. She reports as being normal this would be a great relief to the patient. If abnormal consideration will need to be given to aspirin therapy and further evaluation, given the high risk of cardiac related deaths in her family

## 2019-10-01 ENCOUNTER — APPOINTMENT (OUTPATIENT)
Dept: PHYSICAL MEDICINE AND REHAB | Facility: CLINIC | Age: 69
End: 2019-10-01

## 2019-10-04 ENCOUNTER — RX RENEWAL (OUTPATIENT)
Age: 69
End: 2019-10-04

## 2019-10-07 LAB
ALBUMIN SERPL ELPH-MCNC: 4.1 G/DL
ANION GAP SERPL CALC-SCNC: 11 MMOL/L
BUN SERPL-MCNC: 21 MG/DL
CALCIUM SERPL-MCNC: 9.5 MG/DL
CHLORIDE SERPL-SCNC: 104 MMOL/L
CO2 SERPL-SCNC: 27 MMOL/L
CREAT SERPL-MCNC: 1.08 MG/DL
GLUCOSE SERPL-MCNC: 94 MG/DL
PHOSPHATE SERPL-MCNC: 4 MG/DL
POTASSIUM SERPL-SCNC: 4.2 MMOL/L
SODIUM SERPL-SCNC: 142 MMOL/L

## 2019-10-29 ENCOUNTER — FORM ENCOUNTER (OUTPATIENT)
Age: 69
End: 2019-10-29

## 2019-10-30 ENCOUNTER — OUTPATIENT (OUTPATIENT)
Dept: OUTPATIENT SERVICES | Facility: HOSPITAL | Age: 69
LOS: 1 days | End: 2019-10-30
Payer: MEDICARE

## 2019-10-30 ENCOUNTER — APPOINTMENT (OUTPATIENT)
Dept: CARDIOLOGY | Facility: CLINIC | Age: 69
End: 2019-10-30

## 2019-10-30 DIAGNOSIS — I10 ESSENTIAL (PRIMARY) HYPERTENSION: ICD-10-CM

## 2019-10-30 DIAGNOSIS — E78.5 HYPERLIPIDEMIA, UNSPECIFIED: ICD-10-CM

## 2019-10-30 DIAGNOSIS — Z96.653 PRESENCE OF ARTIFICIAL KNEE JOINT, BILATERAL: Chronic | ICD-10-CM

## 2019-10-30 DIAGNOSIS — R07.89 OTHER CHEST PAIN: ICD-10-CM

## 2019-10-30 DIAGNOSIS — Z98.890 OTHER SPECIFIED POSTPROCEDURAL STATES: Chronic | ICD-10-CM

## 2019-10-30 DIAGNOSIS — Z98.89 OTHER SPECIFIED POSTPROCEDURAL STATES: Chronic | ICD-10-CM

## 2019-10-30 DIAGNOSIS — R07.9 CHEST PAIN, UNSPECIFIED: ICD-10-CM

## 2019-10-30 PROCEDURE — 75574 CT ANGIO HRT W/3D IMAGE: CPT | Mod: 26

## 2019-10-31 ENCOUNTER — APPOINTMENT (OUTPATIENT)
Dept: CARDIOLOGY | Facility: CLINIC | Age: 69
End: 2019-10-31
Payer: MEDICARE

## 2019-10-31 ENCOUNTER — NON-APPOINTMENT (OUTPATIENT)
Age: 69
End: 2019-10-31

## 2019-10-31 VITALS
HEIGHT: 61 IN | DIASTOLIC BLOOD PRESSURE: 72 MMHG | BODY MASS INDEX: 30.96 KG/M2 | HEART RATE: 85 BPM | WEIGHT: 164 LBS | OXYGEN SATURATION: 95 % | RESPIRATION RATE: 15 BRPM | SYSTOLIC BLOOD PRESSURE: 109 MMHG

## 2019-10-31 PROCEDURE — 99215 OFFICE O/P EST HI 40 MIN: CPT

## 2019-10-31 PROCEDURE — 93000 ELECTROCARDIOGRAM COMPLETE: CPT

## 2019-10-31 NOTE — ASSESSMENT
[FreeTextEntry1] : Summary, the patient is a 69-year-old woman with hypertension hyperlipidemia a remote history of smoking and very extensive family history of premature coronary disease and death. She is quite concerned over meeting the same fate and her noninvasive imaging demonstrates extensive coronary calcification.\par \par I have advised that she undergo angiography to fully assess her coronary arteries and assess whether or not she should continue with medication, have stents, or even the possibility of coronary bypass grafting.\par \par In the meantime the patient is advised to resume her aspirin which she had stopped in the past.

## 2019-10-31 NOTE — REASON FOR VISIT
[Follow-Up - Clinic] : a clinic follow-up of [Abnormal Test Result] : an abnormal test result [Coronary Artery Disease] : coronary artery disease [FreeTextEntry1] : She returns for followup following the events of yesterday. She was sent for a 64 slice CT a however this was terminated by the radiologist to 2 very extensive coronary calcification suggestive of severe coronary disease. He felt he would be unable to visualize the arteries due to the extensive calcium and recommended angiography. The patient does admit to fatigue in recent months. She does continue to have chest pain but it lasts about 25 minutes and she does not believe it is ever exertional. It does not radiate to the neck back or arms. It is not associated with diaphoresis or shortness of breath.

## 2019-11-01 ENCOUNTER — OUTPATIENT (OUTPATIENT)
Dept: OUTPATIENT SERVICES | Facility: HOSPITAL | Age: 69
LOS: 1 days | End: 2019-11-01
Payer: MEDICARE

## 2019-11-01 DIAGNOSIS — Z98.89 OTHER SPECIFIED POSTPROCEDURAL STATES: Chronic | ICD-10-CM

## 2019-11-01 DIAGNOSIS — Z96.653 PRESENCE OF ARTIFICIAL KNEE JOINT, BILATERAL: Chronic | ICD-10-CM

## 2019-11-01 DIAGNOSIS — Z98.890 OTHER SPECIFIED POSTPROCEDURAL STATES: Chronic | ICD-10-CM

## 2019-11-04 ENCOUNTER — INPATIENT (INPATIENT)
Facility: HOSPITAL | Age: 69
LOS: 1 days | Discharge: HOME | DRG: 247 | End: 2019-11-06
Attending: INTERNAL MEDICINE | Admitting: INTERNAL MEDICINE
Payer: MEDICARE

## 2019-11-04 ENCOUNTER — TRANSCRIPTION ENCOUNTER (OUTPATIENT)
Age: 69
End: 2019-11-04

## 2019-11-04 VITALS
HEART RATE: 61 BPM | WEIGHT: 164.91 LBS | OXYGEN SATURATION: 96 % | RESPIRATION RATE: 16 BRPM | HEIGHT: 60 IN | DIASTOLIC BLOOD PRESSURE: 73 MMHG | TEMPERATURE: 98 F | SYSTOLIC BLOOD PRESSURE: 168 MMHG

## 2019-11-04 DIAGNOSIS — Z98.890 OTHER SPECIFIED POSTPROCEDURAL STATES: Chronic | ICD-10-CM

## 2019-11-04 DIAGNOSIS — Z98.89 OTHER SPECIFIED POSTPROCEDURAL STATES: Chronic | ICD-10-CM

## 2019-11-04 DIAGNOSIS — K46.9 UNSPECIFIED ABDOMINAL HERNIA WITHOUT OBSTRUCTION OR GANGRENE: Chronic | ICD-10-CM

## 2019-11-04 DIAGNOSIS — Z96.653 PRESENCE OF ARTIFICIAL KNEE JOINT, BILATERAL: Chronic | ICD-10-CM

## 2019-11-04 DIAGNOSIS — R94.39 ABNORMAL RESULT OF OTHER CARDIOVASCULAR FUNCTION STUDY: ICD-10-CM

## 2019-11-04 LAB
ANION GAP SERPL CALC-SCNC: 11 MMOL/L — SIGNIFICANT CHANGE UP (ref 5–17)
BUN SERPL-MCNC: 16 MG/DL — SIGNIFICANT CHANGE UP (ref 7–23)
CALCIUM SERPL-MCNC: 9.5 MG/DL — SIGNIFICANT CHANGE UP (ref 8.4–10.5)
CHLORIDE SERPL-SCNC: 104 MMOL/L — SIGNIFICANT CHANGE UP (ref 96–108)
CO2 SERPL-SCNC: 28 MMOL/L — SIGNIFICANT CHANGE UP (ref 22–31)
CREAT SERPL-MCNC: 0.89 MG/DL — SIGNIFICANT CHANGE UP (ref 0.5–1.3)
GLUCOSE SERPL-MCNC: 87 MG/DL — SIGNIFICANT CHANGE UP (ref 70–99)
HCT VFR BLD CALC: 35.8 % — SIGNIFICANT CHANGE UP (ref 34.5–45)
HGB BLD-MCNC: 11.2 G/DL — LOW (ref 11.5–15.5)
MCHC RBC-ENTMCNC: 27.4 PG — SIGNIFICANT CHANGE UP (ref 27–34)
MCHC RBC-ENTMCNC: 31.3 GM/DL — LOW (ref 32–36)
MCV RBC AUTO: 87.5 FL — SIGNIFICANT CHANGE UP (ref 80–100)
NRBC # BLD: 0 /100 WBCS — SIGNIFICANT CHANGE UP (ref 0–0)
PLATELET # BLD AUTO: 254 K/UL — SIGNIFICANT CHANGE UP (ref 150–400)
POTASSIUM SERPL-MCNC: 3.9 MMOL/L — SIGNIFICANT CHANGE UP (ref 3.5–5.3)
POTASSIUM SERPL-SCNC: 3.9 MMOL/L — SIGNIFICANT CHANGE UP (ref 3.5–5.3)
RBC # BLD: 4.09 M/UL — SIGNIFICANT CHANGE UP (ref 3.8–5.2)
RBC # FLD: 14.7 % — HIGH (ref 10.3–14.5)
SODIUM SERPL-SCNC: 143 MMOL/L — SIGNIFICANT CHANGE UP (ref 135–145)
WBC # BLD: 5.9 K/UL — SIGNIFICANT CHANGE UP (ref 3.8–10.5)
WBC # FLD AUTO: 5.9 K/UL — SIGNIFICANT CHANGE UP (ref 3.8–10.5)

## 2019-11-04 PROCEDURE — 93005 ELECTROCARDIOGRAM TRACING: CPT

## 2019-11-04 PROCEDURE — 75574 CT ANGIO HRT W/3D IMAGE: CPT

## 2019-11-04 PROCEDURE — 92928 PRQ TCAT PLMT NTRAC ST 1 LES: CPT | Mod: LC,GC

## 2019-11-04 PROCEDURE — 93010 ELECTROCARDIOGRAM REPORT: CPT

## 2019-11-04 PROCEDURE — 93454 CORONARY ARTERY ANGIO S&I: CPT | Mod: 26,59,GC

## 2019-11-04 PROCEDURE — 99152 MOD SED SAME PHYS/QHP 5/>YRS: CPT | Mod: GC

## 2019-11-04 RX ORDER — DILTIAZEM HCL 120 MG
240 CAPSULE, EXT RELEASE 24 HR ORAL DAILY
Refills: 0 | Status: DISCONTINUED | OUTPATIENT
Start: 2019-11-04 | End: 2019-11-06

## 2019-11-04 RX ORDER — ASPIRIN/CALCIUM CARB/MAGNESIUM 324 MG
81 TABLET ORAL DAILY
Refills: 0 | Status: DISCONTINUED | OUTPATIENT
Start: 2019-11-04 | End: 2019-11-06

## 2019-11-04 RX ORDER — FUROSEMIDE 40 MG
20 TABLET ORAL DAILY
Refills: 0 | Status: DISCONTINUED | OUTPATIENT
Start: 2019-11-04 | End: 2019-11-06

## 2019-11-04 RX ORDER — OXYCODONE HYDROCHLORIDE 5 MG/1
15 TABLET ORAL ONCE
Refills: 0 | Status: DISCONTINUED | OUTPATIENT
Start: 2019-11-04 | End: 2019-11-04

## 2019-11-04 RX ORDER — ATORVASTATIN CALCIUM 80 MG/1
40 TABLET, FILM COATED ORAL AT BEDTIME
Refills: 0 | Status: DISCONTINUED | OUTPATIENT
Start: 2019-11-04 | End: 2019-11-06

## 2019-11-04 RX ORDER — PANTOPRAZOLE SODIUM 20 MG/1
40 TABLET, DELAYED RELEASE ORAL
Refills: 0 | Status: DISCONTINUED | OUTPATIENT
Start: 2019-11-04 | End: 2019-11-06

## 2019-11-04 RX ORDER — GABAPENTIN 400 MG/1
400 CAPSULE ORAL
Refills: 0 | Status: DISCONTINUED | OUTPATIENT
Start: 2019-11-04 | End: 2019-11-06

## 2019-11-04 RX ORDER — LEVOTHYROXINE SODIUM 125 MCG
125 TABLET ORAL DAILY
Refills: 0 | Status: DISCONTINUED | OUTPATIENT
Start: 2019-11-04 | End: 2019-11-06

## 2019-11-04 RX ORDER — ALLOPURINOL 300 MG
300 TABLET ORAL DAILY
Refills: 0 | Status: DISCONTINUED | OUTPATIENT
Start: 2019-11-04 | End: 2019-11-06

## 2019-11-04 RX ORDER — IBUPROFEN 200 MG
1 TABLET ORAL
Qty: 0 | Refills: 0 | DISCHARGE

## 2019-11-04 RX ORDER — CLOPIDOGREL BISULFATE 75 MG/1
75 TABLET, FILM COATED ORAL DAILY
Refills: 0 | Status: DISCONTINUED | OUTPATIENT
Start: 2019-11-05 | End: 2019-11-06

## 2019-11-04 RX ADMIN — Medication 2.5 MILLIGRAM(S): at 14:53

## 2019-11-04 RX ADMIN — Medication 240 MILLIGRAM(S): at 14:53

## 2019-11-04 RX ADMIN — GABAPENTIN 400 MILLIGRAM(S): 400 CAPSULE ORAL at 18:11

## 2019-11-04 RX ADMIN — PANTOPRAZOLE SODIUM 40 MILLIGRAM(S): 20 TABLET, DELAYED RELEASE ORAL at 14:53

## 2019-11-04 RX ADMIN — ATORVASTATIN CALCIUM 40 MILLIGRAM(S): 80 TABLET, FILM COATED ORAL at 20:45

## 2019-11-04 RX ADMIN — OXYCODONE HYDROCHLORIDE 15 MILLIGRAM(S): 5 TABLET ORAL at 16:40

## 2019-11-04 RX ADMIN — Medication 300 MILLIGRAM(S): at 14:53

## 2019-11-04 RX ADMIN — OXYCODONE HYDROCHLORIDE 15 MILLIGRAM(S): 5 TABLET ORAL at 16:02

## 2019-11-04 NOTE — H&P CARDIOLOGY - FAMILY HISTORY
Family history of premature CAD     Family history of stomach cancer     Sibling  Still living? No  Family history of diabetes mellitus (DM), Age at diagnosis: 51-60

## 2019-11-04 NOTE — CHART NOTE - NSCHARTNOTEFT_GEN_A_CORE
Admit- patient underwent a PCI procedure and is being admitted due to high risk characteristics and is considered to be at an increased risk of major adverse cardiovascular events if discharged at this time   - staged procedure

## 2019-11-04 NOTE — CHART NOTE - NSCHARTNOTEFT_GEN_A_CORE
The Drug Utilization search was performed before prescribing Oxycodone   reference #: [id number]     Search Terms: denia marshall, 1950     Search Date: 11/04/2019 03:57:41 PM   Patient Name: Denia Marshall YOB: 1950   Address: [address] [place] [id number] Sex: Female

## 2019-11-04 NOTE — H&P CARDIOLOGY - PSH
Abdominal hernia    H/O right inguinal hernia repair  2014  H/O total knee replacement, bilateral  2007  History of cholecystectomy  1997  S/P lumbar laminectomy  2015

## 2019-11-04 NOTE — H&P CARDIOLOGY - HISTORY OF PRESENT ILLNESS
This is a 68 y/o  female with no implantable devices with PMHX HTN, HLD, Heart murmur, Hypothyroidism , OA, OP, COPD, mild, Gout, Pulmonary Embolism 1974 due to immobility s/p fall and right knee surgery, Rheumatic fever at 17 years old, Scoliosis, Acid reflux and abdominal hernia. Pt presents with complaints CP Substernal both left and right chest wall with rest lasting 1-5minutes associated with sob and fatigue over past 4months pain relieved with a chest rub .Pt had lightheadedness denies any palpitations no dizziness no fainting noted. Pt noticed increased fatigue over last several months.  Denies nausea or vomiting. PT went to Dr Strange who referred her to  Dr. Ward and was referred for CT scan of chest . CT was terminated by the radiologist due to very extensive  coronary calcification suggestive of severe coronary disease. Now referred for C today with Dr. Louis Calvillo . Currently CP free no complains of palpitations no lightheadedness or dizziness noted    < from: CT Heart with Coronaries (10.30.19 @ 10:41) >  MPRESSION:    Incomplete coronary CTA. The post contrast portion was not performed due   to high calcium score.    Calcium Score: The observed calcium score of 1863  (; ; LCX   421) is at percentile 99 for subjects of the same age, gender, and   race/ethnicity who are free of clinical cardiovascular disease and   treated diabetes. (PEREZ Score)      NONCARDIAC FINDINGS: No potentially significant noncardiac findings in   the visualized portions of the chest and upper abdomen  ALECIA HUTCHISON M.D., ATTENDING RADIOLOGIST  This document has been electronically signed. Oct 30 2019 11:03AM              < end of copied text >

## 2019-11-05 DIAGNOSIS — I10 ESSENTIAL (PRIMARY) HYPERTENSION: ICD-10-CM

## 2019-11-05 DIAGNOSIS — E78.5 HYPERLIPIDEMIA, UNSPECIFIED: ICD-10-CM

## 2019-11-05 DIAGNOSIS — I25.118 ATHEROSCLEROTIC HEART DISEASE OF NATIVE CORONARY ARTERY WITH OTHER FORMS OF ANGINA PECTORIS: ICD-10-CM

## 2019-11-05 LAB
ALBUMIN SERPL ELPH-MCNC: 3.7 G/DL — SIGNIFICANT CHANGE UP (ref 3.3–5)
ALP SERPL-CCNC: 64 U/L — SIGNIFICANT CHANGE UP (ref 40–120)
ALT FLD-CCNC: 12 U/L — SIGNIFICANT CHANGE UP (ref 10–45)
ANION GAP SERPL CALC-SCNC: 13 MMOL/L — SIGNIFICANT CHANGE UP (ref 5–17)
AST SERPL-CCNC: 22 U/L — SIGNIFICANT CHANGE UP (ref 10–40)
BILIRUB SERPL-MCNC: 0.3 MG/DL — SIGNIFICANT CHANGE UP (ref 0.2–1.2)
BUN SERPL-MCNC: 14 MG/DL — SIGNIFICANT CHANGE UP (ref 7–23)
CALCIUM SERPL-MCNC: 9.4 MG/DL — SIGNIFICANT CHANGE UP (ref 8.4–10.5)
CHLORIDE SERPL-SCNC: 105 MMOL/L — SIGNIFICANT CHANGE UP (ref 96–108)
CO2 SERPL-SCNC: 24 MMOL/L — SIGNIFICANT CHANGE UP (ref 22–31)
CREAT SERPL-MCNC: 0.87 MG/DL — SIGNIFICANT CHANGE UP (ref 0.5–1.3)
GLUCOSE SERPL-MCNC: 96 MG/DL — SIGNIFICANT CHANGE UP (ref 70–99)
HBA1C BLD-MCNC: 5.6 % — SIGNIFICANT CHANGE UP (ref 4–5.6)
HCT VFR BLD CALC: 36.1 % — SIGNIFICANT CHANGE UP (ref 34.5–45)
HGB BLD-MCNC: 10.9 G/DL — LOW (ref 11.5–15.5)
MCHC RBC-ENTMCNC: 26.5 PG — LOW (ref 27–34)
MCHC RBC-ENTMCNC: 30.2 GM/DL — LOW (ref 32–36)
MCV RBC AUTO: 87.6 FL — SIGNIFICANT CHANGE UP (ref 80–100)
NRBC # BLD: 0 /100 WBCS — SIGNIFICANT CHANGE UP (ref 0–0)
PLATELET # BLD AUTO: 247 K/UL — SIGNIFICANT CHANGE UP (ref 150–400)
POTASSIUM SERPL-MCNC: 3.9 MMOL/L — SIGNIFICANT CHANGE UP (ref 3.5–5.3)
POTASSIUM SERPL-SCNC: 3.9 MMOL/L — SIGNIFICANT CHANGE UP (ref 3.5–5.3)
PROT SERPL-MCNC: 6.2 G/DL — SIGNIFICANT CHANGE UP (ref 6–8.3)
RBC # BLD: 4.12 M/UL — SIGNIFICANT CHANGE UP (ref 3.8–5.2)
RBC # FLD: 14.6 % — HIGH (ref 10.3–14.5)
SODIUM SERPL-SCNC: 142 MMOL/L — SIGNIFICANT CHANGE UP (ref 135–145)
WBC # BLD: 5.68 K/UL — SIGNIFICANT CHANGE UP (ref 3.8–10.5)
WBC # FLD AUTO: 5.68 K/UL — SIGNIFICANT CHANGE UP (ref 3.8–10.5)

## 2019-11-05 PROCEDURE — 99232 SBSQ HOSP IP/OBS MODERATE 35: CPT

## 2019-11-05 PROCEDURE — 92928 PRQ TCAT PLMT NTRAC ST 1 LES: CPT | Mod: RC

## 2019-11-05 PROCEDURE — 93010 ELECTROCARDIOGRAM REPORT: CPT

## 2019-11-05 PROCEDURE — 99152 MOD SED SAME PHYS/QHP 5/>YRS: CPT

## 2019-11-05 RX ORDER — OXYCODONE HYDROCHLORIDE 5 MG/1
15 TABLET ORAL ONCE
Refills: 0 | Status: DISCONTINUED | OUTPATIENT
Start: 2019-11-05 | End: 2019-11-05

## 2019-11-05 RX ORDER — ACETAMINOPHEN 500 MG
650 TABLET ORAL EVERY 6 HOURS
Refills: 0 | Status: DISCONTINUED | OUTPATIENT
Start: 2019-11-05 | End: 2019-11-06

## 2019-11-05 RX ADMIN — Medication 300 MILLIGRAM(S): at 11:25

## 2019-11-05 RX ADMIN — PANTOPRAZOLE SODIUM 40 MILLIGRAM(S): 20 TABLET, DELAYED RELEASE ORAL at 05:13

## 2019-11-05 RX ADMIN — OXYCODONE HYDROCHLORIDE 15 MILLIGRAM(S): 5 TABLET ORAL at 21:57

## 2019-11-05 RX ADMIN — Medication 650 MILLIGRAM(S): at 00:59

## 2019-11-05 RX ADMIN — Medication 650 MILLIGRAM(S): at 00:29

## 2019-11-05 RX ADMIN — GABAPENTIN 400 MILLIGRAM(S): 400 CAPSULE ORAL at 05:13

## 2019-11-05 RX ADMIN — Medication 2.5 MILLIGRAM(S): at 05:13

## 2019-11-05 RX ADMIN — ATORVASTATIN CALCIUM 40 MILLIGRAM(S): 80 TABLET, FILM COATED ORAL at 21:58

## 2019-11-05 RX ADMIN — OXYCODONE HYDROCHLORIDE 15 MILLIGRAM(S): 5 TABLET ORAL at 05:18

## 2019-11-05 RX ADMIN — GABAPENTIN 400 MILLIGRAM(S): 400 CAPSULE ORAL at 17:20

## 2019-11-05 RX ADMIN — Medication 240 MILLIGRAM(S): at 07:25

## 2019-11-05 RX ADMIN — Medication 650 MILLIGRAM(S): at 17:21

## 2019-11-05 RX ADMIN — Medication 650 MILLIGRAM(S): at 11:25

## 2019-11-05 RX ADMIN — Medication 20 MILLIGRAM(S): at 05:13

## 2019-11-05 RX ADMIN — CLOPIDOGREL BISULFATE 75 MILLIGRAM(S): 75 TABLET, FILM COATED ORAL at 05:13

## 2019-11-05 RX ADMIN — Medication 650 MILLIGRAM(S): at 18:22

## 2019-11-05 RX ADMIN — OXYCODONE HYDROCHLORIDE 15 MILLIGRAM(S): 5 TABLET ORAL at 22:50

## 2019-11-05 RX ADMIN — Medication 81 MILLIGRAM(S): at 05:13

## 2019-11-05 RX ADMIN — Medication 650 MILLIGRAM(S): at 12:30

## 2019-11-05 RX ADMIN — Medication 125 MICROGRAM(S): at 05:13

## 2019-11-05 RX ADMIN — OXYCODONE HYDROCHLORIDE 15 MILLIGRAM(S): 5 TABLET ORAL at 05:48

## 2019-11-05 NOTE — DISCHARGE NOTE PROVIDER - NSDCFUADDINST_GEN_ALL_CORE_FT
No heavy lifting,  pushing, pulling with affected arm for 2 weeks.  No strenuous  activity  for 2 weeks.  No driving for 2 days. You may shower 24 hours following procedure but avoid baths and swimming for 1 week. Check wrist site for bleeding and/or swelling daily following procedure. Call your doctor/cardiologist immediately should it occur or if you have increased/persistent pain at the site. Follow up with your cardiologist in 1- 2 weeks. You may call [facility] at [phone] or [phone] after office hours and weekends with any questions or concerns following your procedure.

## 2019-11-05 NOTE — DISCHARGE NOTE PROVIDER - NSDCCPTREATMENT_GEN_ALL_CORE_FT
PRINCIPAL PROCEDURE  Procedure: Placement of coronary artery stent  Findings and Treatment: 11/4 one mid circ stent PRINCIPAL PROCEDURE  Procedure: Placement of coronary artery stent  Findings and Treatment: s/p LHC JAC x 1 mCirc (11/054) & JAC x 1 RCA (11/05)

## 2019-11-05 NOTE — DISCHARGE NOTE PROVIDER - CARE PROVIDER_API CALL
Bernabe Pacheco)  Cardiology; Internal Medicine  [address] [facility], [address]  [place] [address]  Phone: [phone]  Fax: [phone]  Follow Up Time:

## 2019-11-05 NOTE — DISCHARGE NOTE PROVIDER - NSDCMRMEDTOKEN_GEN_ALL_CORE_FT
allopurinol 300 mg oral tablet: 1 tab(s) orally once a day  Aspir 81 oral delayed release tablet: 1 tab(s) orally once a day  diltiazem 240 mg/24 hours oral capsule, extended release: 1 cap(s) orally once a day  enalapril 2.5 mg oral tablet: 1 tab(s) orally once a day  furosemide 20 mg oral tablet: 1 tab(s) orally once a day  gabapentin 100 mg oral capsule: 4 cap(s) orally 2 times a day  Klor-Con M20 oral tablet, extended release:  orally once a day  levothyroxine 125 mcg (0.125 mg) oral capsule: 1 cap(s) orally once a day  lovastatin 20 mg oral tablet: 1 tab(s) orally once a day  pm  omeprazole 40 mg oral delayed release capsule: 1 cap(s) orally once a day  oxyCODONE 30 mg oral tablet: 1 tab(s) orally every 4 hours while awake, As Needed  potassium citrate 15 mEq oral tablet, extended release: 1 tab(s) orally once a day  valACYclovir 1 g oral tablet: 1 tab(s) orally 2 times a day, As Needed  Vitamin C 1000 mg oral tablet: 1 tab(s) orally once a day allopurinol 300 mg oral tablet: 1 tab(s) orally once a day  Aspir 81 oral delayed release tablet: 1 tab(s) orally once a day  diltiazem 240 mg/24 hours oral capsule, extended release: 1 cap(s) orally once a day  enalapril 2.5 mg oral tablet: 1 tab(s) orally once a day  furosemide 20 mg oral tablet: 1 tab(s) orally once a day  gabapentin 100 mg oral capsule: 4 cap(s) orally 2 times a day  Klor-Con M20 oral tablet, extended release:  orally once a day  levothyroxine 125 mcg (0.125 mg) oral capsule: 1 cap(s) orally once a day  lovastatin 20 mg oral tablet: 1 tab(s) orally once a day  pm  omeprazole 40 mg oral delayed release capsule: 1 cap(s) orally once a day  oxyCODONE 30 mg oral tablet: 1 tab(s) orally every 4 hours while awake, As Needed  potassium citrate 15 mEq oral tablet, extended release: 1 tab(s) orally once a day  valACYclovir 1 g oral tablet: 1 tab(s) orally 2 times a day, As Needed allopurinol 300 mg oral tablet: 1 tab(s) orally once a day  Aspir 81 oral delayed release tablet: 1 tab(s) orally once a day  clopidogrel 75 mg oral tablet: 1 tab(s) orally once a day MDD:One  diltiazem 240 mg/24 hours oral capsule, extended release: 1 cap(s) orally once a day  enalapril 2.5 mg oral tablet: 1 tab(s) orally once a day  furosemide 20 mg oral tablet: 1 tab(s) orally once a day  gabapentin 100 mg oral capsule: 4 cap(s) orally 2 times a day  Klor-Con M20 oral tablet, extended release:  orally once a day  levothyroxine 125 mcg (0.125 mg) oral capsule: 1 cap(s) orally once a day  lovastatin 20 mg oral tablet: 1 tab(s) orally once a day  pm  omeprazole 40 mg oral delayed release capsule: 1 cap(s) orally once a day  oxyCODONE 30 mg oral tablet: 1 tab(s) orally every 4 hours while awake, As Needed  potassium citrate 15 mEq oral tablet, extended release: 1 tab(s) orally once a day  valACYclovir 1 g oral tablet: 1 tab(s) orally 2 times a day, As Needed

## 2019-11-05 NOTE — PROGRESS NOTE ADULT - SUBJECTIVE AND OBJECTIVE BOX
Patient is a 69y old  Female who presents with a chief complaint of         Allergies    colchicine (Vomiting)  lactose (Other)    Intolerances        Medications:  acetaminophen   Tablet .. 650 milliGRAM(s) Oral every 6 hours PRN  allopurinol 300 milliGRAM(s) Oral daily  aspirin enteric coated 81 milliGRAM(s) Oral daily  atorvastatin 40 milliGRAM(s) Oral at bedtime  clopidogrel Tablet 75 milliGRAM(s) Oral daily  diltiazem    milliGRAM(s) Oral daily  enalapril 2.5 milliGRAM(s) Oral daily  furosemide    Tablet 20 milliGRAM(s) Oral daily  gabapentin 400 milliGRAM(s) Oral two times a day  levothyroxine 125 MICROGram(s) Oral daily  pantoprazole    Tablet 40 milliGRAM(s) Oral before breakfast      Vitals:  T(C): 36.6 (19 @ 19:27), Max: 36.8 (19 @ 09:17)  HR: 57 (19 @ 19:27) (57 - 66)  BP: 122/59 (19 @ 19:27) (122/59 - 170/68)  BP(mean): 104 (19 @ 09:17) (104 - 104)  RR: 18 (19 @ 19:27) (16 - 18)  SpO2: 97% (19 @ 19:27) (95% - 97%)  Wt(kg): --  Daily Height in cm: 152.4 (2019 11:25)    Daily Weight in k.8 (2019 09:17)  I&O's Summary    2019 07:01  -  2019 01:19  --------------------------------------------------------  IN: 420 mL / OUT: 0 mL / NET: 420 mL          Physical Exam:  Appearance: Normal  Eyes: PERRL, EOMI  HENT: Normal oral mus[place], NC/AT  Cardiovascular: S1S2, RRR, No M/R/G, no JVD, No Lower extremity edema  Procedural Access Site: No hematoma, Non-tender to palpation, 2+ pulse, No bruit, No Ecchymosis  Respiratory: Clear to auscultation bilaterally  Gastrointestinal: Soft, Non tender, Normal Bowel Sounds  Musculoskeletal: No clubbing, No joint deformity   Neurologic: Non-focal  Lymphatic: No lymphadenopathy  Psychiatry: AAOx3, Mood & affect appropriate  Skin: No rashes, No ecchymoses, No cyanosis        143  |  104  |  16  ----------------------------<  87  3.9   |  28  |  0.89    Ca    9.5      2019 10:15              Lipid panel   Hgb A1c                         11.2   5.90  )-----------( 254      ( 2019 10:15 )             35.8         ECG: SR 61 bpm    Cath: one mid circ stent    Imaging:    Interpretation of Telemetry:

## 2019-11-05 NOTE — PROGRESS NOTE ADULT - ASSESSMENT
HPI:  This is a 68 y/o  female with no implantable devices with PMHX HTN, HLD, Heart murmur, Hypothyroidism , OA, OP, COPD, mild, Gout, Pulmonary Embolism 1974 due to immobility s/p fall and right knee surgery, Rheumatic fever at 17 years old, Scoliosis, Acid reflux and abdominal hernia. Pt presents with complaints CP Substernal both left and right chest wall with rest lasting 1-5minutes associated with sob and fatigue over past 4months pain relieved with a chest rub .Pt had lightheadedness denies any palpitations no dizziness no fainting noted. Pt noticed increased fatigue over last several months.  Denies nausea or vomiting. PT went to Dr Strange who referred her to  Dr. Ward and was referred for CT scan of chest . CT was terminated by the radiologist due to very extensive  coronary calcification suggestive of severe coronary disease. Now referred for LHC today with Dr. Louis Calvillo . Currently CP free no complains of palpitations no lightheadedness or dizziness noted    < from: CT Heart with Coronaries (10.30.19 @ 10:41) >  MPRESSION:    Incomplete coronary CTA. The post contrast portion was not performed due   to high calcium score.    Calcium Score: The observed calcium score of 1863  (; ; LCX   421) is at percentile 99 for subjects of the same age, gender, and   race/ethnicity who are free of clinical cardiovascular disease and   treated diabetes. (PEREZ Score)      NONCARDIAC FINDINGS: No potentially significant noncardiac findings in   the visualized portions of the chest and upper abdomen  ALECIA HUTCHISON M.D., ATTENDING RADIOLOGIST  This document has been electronically signed. Oct 30 2019 11:03AM              < end of copied text > (04 Nov 2019 09:17)

## 2019-11-05 NOTE — DISCHARGE NOTE PROVIDER - NSDCFUSCHEDAPPT_GEN_ALL_CORE_FT
EZEQUIEL WRIGHT ; 11/20/2019 ; [facility] Gastro [facility] EZEQUIEL WRIGHT ; 11/20/2019 ; [facility] Gastro 10 [facility] EZEQUIEL WRIGHT ; 11/20/2019 ; NPP Gastro 10 [facility]

## 2019-11-05 NOTE — DISCHARGE NOTE PROVIDER - NSDCCPCAREPLAN_GEN_ALL_CORE_FT
PRINCIPAL DISCHARGE DIAGNOSIS  Diagnosis: CAD (coronary artery disease)  Assessment and Plan of Treatment: Pt remains chest pain free and understands post cath discharge instructions   No heavy lifting or pushing/pulling with procedure arm for 2 weeks. No driving for 2 days. You may shower 24 hours following the procedure but avoid baths/swimming for 1 week. Check your wrist site for bleeding and/or swelling daily following procedure and call your doctor immediately if it occurs or if you experience increased pain at the site. Follow up with your cardiologist in 1-2 weeks. You may call [facility] if you have any questions/concerns regarding your procedure [phone].      SECONDARY DISCHARGE DIAGNOSES  Diagnosis: HLD (hyperlipidemia)  Assessment and Plan of Treatment: Your LDL cholesterol will be less than 70mg/dL   Continue with your cholesterol medications. Eat a heart healthy diet that is low in saturated fats and salt, and includes whole grains, fruits, vegetables and lean protein; exercise regularly (consult with your physician or cardiologist first); maintain a heart healthy weight. Continue to follow with your primary physician or cardiologist for treatment goals, continue medication, have liver function testing every 3 months as anti lipid medications can cause liver irritation. If you smoke - quit (A resource to help you stop smoking is the [facility] [employer] for Tobacco Control – phone number [phone].).    Diagnosis: HTN (hypertension)  Assessment and Plan of Treatment: Your blood pressure will be controlled.   Continue with your blood pressure medications; eat a heart healthy diet with low salt diet; exercise regularly (consult with your physician or cardiologist first); maintain a heart healthy weight; if you smoke - quit (A resource to help you stop smoking is the [facility] [employer] for Tobacco Control – phone number [phone].); include healthy ways to manage stress. Continue to follow with your primary care physician or cardiologist.

## 2019-11-05 NOTE — DISCHARGE NOTE PROVIDER - HOSPITAL COURSE
HPI:    This is a 70 y/o  female with no implantable devices with PMHX HTN, HLD, Heart murmur, Hypothyroidism , OA, OP, COPD, mild, Gout, Pulmonary Embolism 1974 due to immobility s/p fall and right knee surgery, Rheumatic fever at 17 years old, Scoliosis, Acid reflux and abdominal hernia. Pt presents with complaints CP Substernal both left and right chest wall with rest lasting 1-5minutes associated with sob and fatigue over past 4months pain relieved with a chest rub .Pt had lightheadedness denies any palpitations no dizziness no fainting noted. Pt noticed increased fatigue over last several months.  Denies nausea or vomiting. PT went to Dr Strange who referred her to  Dr. Pacheco and was referred for CT scan of chest . CT was terminated by the radiologist due to very extensive  coronary calcification suggestive of severe coronary disease. Now referred for LHC today with Dr. Louis Calvillo . Currently CP free no complains of palpitations no lightheadedness or dizziness noted.        11/4 cardiac cath with one stent to the mid circ via right radial access. HPI:    This is a 68 y/o  female with no implantable devices with PMHX HTN, HLD, Heart murmur, Hypothyroidism , OA, OP, COPD, mild, Gout, Pulmonary Embolism 1974 due to immobility s/p fall and right knee surgery, Rheumatic fever at 17 years old, Scoliosis, Acid reflux and abdominal hernia. Pt presents with complaints CP Substernal both left and right chest wall with rest lasting 1-5minutes associated with sob and fatigue over past 4months pain relieved with a chest rub .Pt had lightheadedness denies any palpitations no dizziness no fainting noted. Pt noticed increased fatigue over last several months.  Denies nausea or vomiting. PT went to Dr Strange who referred her to  Dr. Pacheco and was referred for CT scan of chest . CT was terminated by the radiologist due to very extensive  coronary calcification suggestive of severe coronary disease. Now referred for [facility] today with Dr. Louis Calvillo . Currently CP free no complains of palpitations no lightheadedness or dizziness noted.        11/4 cardiac cath with one stent to the mid circ via right radial access.    11/05 [facility] JAC x 1 RCA via RRA access         Do not stop you Aspirin or Plavix unless instructed to do so by your cardiologist.

## 2019-11-06 ENCOUNTER — TRANSCRIPTION ENCOUNTER (OUTPATIENT)
Age: 69
End: 2019-11-06

## 2019-11-06 VITALS
OXYGEN SATURATION: 96 % | DIASTOLIC BLOOD PRESSURE: 52 MMHG | HEART RATE: 65 BPM | RESPIRATION RATE: 17 BRPM | TEMPERATURE: 98 F | SYSTOLIC BLOOD PRESSURE: 104 MMHG

## 2019-11-06 LAB
ANION GAP SERPL CALC-SCNC: 10 MMOL/L — SIGNIFICANT CHANGE UP (ref 5–17)
BUN SERPL-MCNC: 17 MG/DL — SIGNIFICANT CHANGE UP (ref 7–23)
CALCIUM SERPL-MCNC: 9.7 MG/DL — SIGNIFICANT CHANGE UP (ref 8.4–10.5)
CHLORIDE SERPL-SCNC: 103 MMOL/L — SIGNIFICANT CHANGE UP (ref 96–108)
CO2 SERPL-SCNC: 29 MMOL/L — SIGNIFICANT CHANGE UP (ref 22–31)
CREAT SERPL-MCNC: 0.95 MG/DL — SIGNIFICANT CHANGE UP (ref 0.5–1.3)
GLUCOSE SERPL-MCNC: 95 MG/DL — SIGNIFICANT CHANGE UP (ref 70–99)
HCT VFR BLD CALC: 37.2 % — SIGNIFICANT CHANGE UP (ref 34.5–45)
HGB BLD-MCNC: 11.7 G/DL — SIGNIFICANT CHANGE UP (ref 11.5–15.5)
MCHC RBC-ENTMCNC: 27.1 PG — SIGNIFICANT CHANGE UP (ref 27–34)
MCHC RBC-ENTMCNC: 31.5 GM/DL — LOW (ref 32–36)
MCV RBC AUTO: 86.1 FL — SIGNIFICANT CHANGE UP (ref 80–100)
NRBC # BLD: 0 /100 WBCS — SIGNIFICANT CHANGE UP (ref 0–0)
PLATELET # BLD AUTO: 275 K/UL — SIGNIFICANT CHANGE UP (ref 150–400)
POTASSIUM SERPL-MCNC: 4 MMOL/L — SIGNIFICANT CHANGE UP (ref 3.5–5.3)
POTASSIUM SERPL-SCNC: 4 MMOL/L — SIGNIFICANT CHANGE UP (ref 3.5–5.3)
RBC # BLD: 4.32 M/UL — SIGNIFICANT CHANGE UP (ref 3.8–5.2)
RBC # FLD: 14.3 % — SIGNIFICANT CHANGE UP (ref 10.3–14.5)
SODIUM SERPL-SCNC: 142 MMOL/L — SIGNIFICANT CHANGE UP (ref 135–145)
WBC # BLD: 6.91 K/UL — SIGNIFICANT CHANGE UP (ref 3.8–10.5)
WBC # FLD AUTO: 6.91 K/UL — SIGNIFICANT CHANGE UP (ref 3.8–10.5)

## 2019-11-06 PROCEDURE — C1894: CPT

## 2019-11-06 PROCEDURE — C1769: CPT

## 2019-11-06 PROCEDURE — C1725: CPT

## 2019-11-06 PROCEDURE — C9600: CPT | Mod: RC

## 2019-11-06 PROCEDURE — 99152 MOD SED SAME PHYS/QHP 5/>YRS: CPT

## 2019-11-06 PROCEDURE — 85027 COMPLETE CBC AUTOMATED: CPT

## 2019-11-06 PROCEDURE — 80053 COMPREHEN METABOLIC PANEL: CPT

## 2019-11-06 PROCEDURE — 80048 BASIC METABOLIC PNL TOTAL CA: CPT

## 2019-11-06 PROCEDURE — C1874: CPT

## 2019-11-06 PROCEDURE — 83036 HEMOGLOBIN GLYCOSYLATED A1C: CPT

## 2019-11-06 PROCEDURE — C1887: CPT

## 2019-11-06 PROCEDURE — 99153 MOD SED SAME PHYS/QHP EA: CPT

## 2019-11-06 PROCEDURE — 93454 CORONARY ARTERY ANGIO S&I: CPT | Mod: 59

## 2019-11-06 PROCEDURE — 99238 HOSP IP/OBS DSCHRG MGMT 30/<: CPT

## 2019-11-06 PROCEDURE — 93005 ELECTROCARDIOGRAM TRACING: CPT

## 2019-11-06 RX ORDER — OXYCODONE HYDROCHLORIDE 5 MG/1
15 TABLET ORAL ONCE
Refills: 0 | Status: DISCONTINUED | OUTPATIENT
Start: 2019-11-06 | End: 2019-11-06

## 2019-11-06 RX ORDER — ASCORBIC ACID 60 MG
1 TABLET,CHEWABLE ORAL
Qty: 0 | Refills: 0 | DISCHARGE

## 2019-11-06 RX ORDER — CLOPIDOGREL BISULFATE 75 MG/1
1 TABLET, FILM COATED ORAL
Qty: 90 | Refills: 3
Start: 2019-11-06 | End: 2020-10-30

## 2019-11-06 RX ADMIN — OXYCODONE HYDROCHLORIDE 15 MILLIGRAM(S): 5 TABLET ORAL at 08:00

## 2019-11-06 RX ADMIN — Medication 125 MICROGRAM(S): at 05:29

## 2019-11-06 RX ADMIN — GABAPENTIN 400 MILLIGRAM(S): 400 CAPSULE ORAL at 05:29

## 2019-11-06 RX ADMIN — OXYCODONE HYDROCHLORIDE 15 MILLIGRAM(S): 5 TABLET ORAL at 09:05

## 2019-11-06 RX ADMIN — CLOPIDOGREL BISULFATE 75 MILLIGRAM(S): 75 TABLET, FILM COATED ORAL at 05:29

## 2019-11-06 RX ADMIN — Medication 20 MILLIGRAM(S): at 05:29

## 2019-11-06 RX ADMIN — Medication 81 MILLIGRAM(S): at 05:30

## 2019-11-06 RX ADMIN — Medication 2.5 MILLIGRAM(S): at 05:29

## 2019-11-06 RX ADMIN — Medication 240 MILLIGRAM(S): at 06:39

## 2019-11-06 RX ADMIN — PANTOPRAZOLE SODIUM 40 MILLIGRAM(S): 20 TABLET, DELAYED RELEASE ORAL at 05:33

## 2019-11-06 NOTE — PROGRESS NOTE ADULT - SUBJECTIVE AND OBJECTIVE BOX
Patient seen and examined at bedside.    Overnight Events:  no significant events overnight, no complaints this AM    Review Of Systems: No chest pain, shortness of breath, or palpitations            Medications:  acetaminophen   Tablet .. 650 milliGRAM(s) Oral every 6 hours PRN  allopurinol 300 milliGRAM(s) Oral daily  aspirin enteric coated 81 milliGRAM(s) Oral daily  atorvastatin 40 milliGRAM(s) Oral at bedtime  clopidogrel Tablet 75 milliGRAM(s) Oral daily  diltiazem    milliGRAM(s) Oral daily  enalapril 2.5 milliGRAM(s) Oral daily  furosemide    Tablet 20 milliGRAM(s) Oral daily  gabapentin 400 milliGRAM(s) Oral two times a day  levothyroxine 125 MICROGram(s) Oral daily  pantoprazole    Tablet 40 milliGRAM(s) Oral before breakfast      PAST MEDICAL & SURGICAL HISTORY:  Osteoporosis  Scoliosis  Heart murmur  Lumbar spinal stenosis  COPD, mild  Rheumatic fever: At 17 years old  Pulmonary embolism: 1974  due to  immobilty s/p fall and right knee injury  Kidney calculi  OA (osteoarthritis)  Gout  GERD (gastroesophageal reflux disease)  Hypothyroidism  Hyperlipidemia  HTN (hypertension)  Abdominal hernia  H/O right inguinal hernia repair: 2014  S/P lumbar laminectomy: 2015  H/O total knee replacement, bilateral: 2007  History of cholecystectomy: 1997      Vitals:  T(F): 97.6 (11-06), Max: 98.4 (11-05)  HR: 65 (11-06) (50 - 65)  BP: 104/52 (11-06) (104/52 - 155/68)  RR: 17 (11-06)  SpO2: 96% (11-06)  I&O's Summary    05 Nov 2019 07:01  -  06 Nov 2019 07:00  --------------------------------------------------------  IN: 780 mL / OUT: 0 mL / NET: 780 mL        Physical Exam:    Access site: right radial site clean, dry, small superfical ecchymosis, no swelling, no pain, strong distal radial pulse.     Appearance: No acute distress; well appearing  Eyes: PERRL, EOMI, pink conjunctiva  HENT: Normal oral muscosa  Cardiovascular: RRR, S1, S2, no murmurs, rubs, or gallops; no edema; no JVD  Respiratory: Clear to auscultation bilaterally  Gastrointestinal: soft, non-tender, non-distended with normal bowel sounds  Musculoskeletal: No clubbing; no joint deformity   Neurologic: Non-focal  Lymphatic: No lymphadenopathy  Psychiatry: AAOx3, mood & affect appropriate  Skin: No rashes, ecchymoses, or cyanosis                          11.7   6.91  )-----------( 275      ( 06 Nov 2019 04:56 )             37.2     11-06    142  |  103  |  17  ----------------------------<  95  4.0   |  29  |  0.95    Ca    9.7      06 Nov 2019 04:56    TPro  6.2  /  Alb  3.7  /  TBili  0.3  /  DBili  x   /  AST  22  /  ALT  12  /  AlkPhos  64  11-05            CAth< from: Cardiac Cath Lab - Adult (11.04.19 @ 10:38) >  CORONARY VESSELS: The coronary circulation is right dominant.  LM:   --  LM: Normal.  LAD:   --  Proximal LAD: There was a 30 % stenosis.  CX:   --  Mid circumflex: There was a 80 % stenosis.  RCA:   --  Mid RCA: There was a 80 % stenosis.    < end of copied text >    s/p intervention to RCA and LCx.

## 2019-11-06 NOTE — PROGRESS NOTE ADULT - ASSESSMENT
Patient is a 69y old  Female who presents with a chief complaint of chest pain (05 Nov 2019 01:25) now s/p C JAC x 1 mCirc (11/04) and JAC x 1 RCA (11/05) via RRA. Pt tolerated the procedure well, site benign. Overnight remained uneventful. Post-procedure discharge instructions discussed and question addressed

## 2019-11-06 NOTE — PROGRESS NOTE ADULT - ASSESSMENT
69 year old woman with hx of HTN, hx of PE, hypothyroidism presenting with chest pain , s/p PCI to Lcx and RCA, currently doing well, with planned discharge today.   Plan:  - continue DAPT for atleast 1 year (ASA 81 mg, Plavix 75 mg )  - continue statin   - follow up with Dr. Calvillo in 1-2 weeks on discharge.     Eriberto Yao MD   Cardiology fellow

## 2019-11-06 NOTE — PROGRESS NOTE ADULT - SUBJECTIVE AND OBJECTIVE BOX
Patient is a 69y old  Female who presents with a chief complaint of chest pain (05 Nov 2019 01:25) now s/p C JAC x 1 mCirc (11/04) and JAC x 1 RCA (11/05) via RRA           Allergies    colchicine (Vomiting)  lactose (Other)    Intolerances        Medications:  acetaminophen   Tablet .. 650 milliGRAM(s) Oral every 6 hours PRN  allopurinol 300 milliGRAM(s) Oral daily  aspirin enteric coated 81 milliGRAM(s) Oral daily  atorvastatin 40 milliGRAM(s) Oral at bedtime  clopidogrel Tablet 75 milliGRAM(s) Oral daily  diltiazem    milliGRAM(s) Oral daily  enalapril 2.5 milliGRAM(s) Oral daily  furosemide    Tablet 20 milliGRAM(s) Oral daily  gabapentin 400 milliGRAM(s) Oral two times a day  levothyroxine 125 MICROGram(s) Oral daily  pantoprazole    Tablet 40 milliGRAM(s) Oral before breakfast      Vitals:  T(C): 36.9 (11-05-19 @ 19:50), Max: 36.9 (11-05-19 @ 19:50)  HR: 60 (11-05-19 @ 19:50) (50 - 65)  BP: 135/66 (11-05-19 @ 19:50) (108/69 - 155/68)  BP(mean): --  RR: 17 (11-05-19 @ 18:50) (17 - 18)  SpO2: 96% (11-05-19 @ 18:50) (93% - 98%)  Wt(kg): --  Daily     Daily   I&O's Summary    04 Nov 2019 07:01  -  05 Nov 2019 07:00  --------------------------------------------------------  IN: 420 mL / OUT: 0 mL / NET: 420 mL    05 Nov 2019 07:01  -  06 Nov 2019 01:49  --------------------------------------------------------  IN: 540 mL / OUT: 0 mL / NET: 540 mL          Physical Exam:  Cardiovascular: S1S2, RRR, No M/R/G, no JVD, No Lower extremity edema  Procedural Access Site: RRA access. No hematoma, Non-tender to palpation, 2+ pulse, No bruit, No Ecchymosis  Respiratory: Clear to auscultation bilaterally  Gastrointestinal: Soft, Non tender, Normal Bowel Sounds  Musculoskeletal: No clubbing, No joint deformity   Neurologic: Non-focal  Psychiatry: AAOx3, Mood & affect appropriate  Skin: No rashes, No ecchymoses, No cyanosis    11-05    142  |  105  |  14  ----------------------------<  96  3.9   |  24  |  0.87    Ca    9.4      05 Nov 2019 05:15    TPro  6.2  /  Alb  3.7  /  TBili  0.3  /  DBili  x   /  AST  22  /  ALT  12  /  AlkPhos  64  11-05            Lipid panel   Hgb A1c Hemoglobin A1C, Whole Blood: 5.6 % (11-05 @ 08:33)        Interpretation of Telemetry:

## 2019-11-06 NOTE — DISCHARGE NOTE NURSING/CASE MANAGEMENT/SOCIAL WORK - PATIENT PORTAL LINK FT
You can access the FollowMyHealth Patient Portal offered by [facility] by registering at the following website: [email or web address]. By joining [facility]’s FollowMyHealth portal, you will also be able to view your health information using other applications (apps) compatible with our system.

## 2019-11-08 DIAGNOSIS — Z71.89 OTHER SPECIFIED COUNSELING: ICD-10-CM

## 2019-11-12 ENCOUNTER — APPOINTMENT (OUTPATIENT)
Dept: CARDIOLOGY | Facility: CLINIC | Age: 69
End: 2019-11-12
Payer: MEDICARE

## 2019-11-12 ENCOUNTER — NON-APPOINTMENT (OUTPATIENT)
Age: 69
End: 2019-11-12

## 2019-11-12 VITALS — SYSTOLIC BLOOD PRESSURE: 118 MMHG | HEART RATE: 64 BPM | DIASTOLIC BLOOD PRESSURE: 62 MMHG

## 2019-11-12 VITALS
HEIGHT: 61 IN | RESPIRATION RATE: 17 BRPM | BODY MASS INDEX: 30.96 KG/M2 | DIASTOLIC BLOOD PRESSURE: 90 MMHG | WEIGHT: 164 LBS | OXYGEN SATURATION: 93 % | SYSTOLIC BLOOD PRESSURE: 140 MMHG | HEART RATE: 66 BPM

## 2019-11-12 PROCEDURE — 99214 OFFICE O/P EST MOD 30 MIN: CPT

## 2019-11-12 PROCEDURE — 93000 ELECTROCARDIOGRAM COMPLETE: CPT

## 2019-11-12 NOTE — REASON FOR VISIT
[Follow-Up - Clinic] : a clinic follow-up of [Coronary Artery Disease] : coronary artery disease [Hyperlipidemia] : hyperlipidemia [FreeTextEntry1] : Patient returns for followup. She is now status post stenting to the right coronary and circumflex coronary. She is now on clopidogrel in addition to her other medications. There was some initial improvement in her fatigue but she states she now remains fatigued. She denies chest discomfort of her shortness of breath has improved there no palpitations dizziness or syncope.

## 2019-11-12 NOTE — PHYSICAL EXAM
[General Appearance - Well Developed] : well developed [Normal Appearance] : normal appearance [Well Groomed] : well groomed [No Deformities] : no deformities [General Appearance - Well Nourished] : well nourished [General Appearance - In No Acute Distress] : no acute distress [Normal Oral Mucosa] : normal oral mucosa [No Oral Pallor] : no oral pallor [No Oral Cyanosis] : no oral cyanosis [Normal Jugular Venous A Waves Present] : normal jugular venous A waves present [Normal Jugular Venous V Waves Present] : normal jugular venous V waves present [No Jugular Venous Triana A Waves] : no jugular venous triana A waves [Respiration, Rhythm And Depth] : normal respiratory rhythm and effort [Exaggerated Use Of Accessory Muscles For Inspiration] : no accessory muscle use [Auscultation Breath Sounds / Voice Sounds] : lungs were clear to auscultation bilaterally [Abdomen Soft] : soft [Abdomen Tenderness] : non-tender [Abdomen Mass (___ Cm)] : no abdominal mass palpated [Abnormal Walk] : normal gait [Gait - Sufficient For Exercise Testing] : the gait was sufficient for exercise testing [Nail Clubbing] : no clubbing of the fingernails [Cyanosis, Localized] : no localized cyanosis [Petechial Hemorrhages (___cm)] : no petechial hemorrhages [Skin Color & Pigmentation] : normal skin color and pigmentation [] : no rash [No Venous Stasis] : no venous stasis [Skin Lesions] : no skin lesions [No Skin Ulcers] : no skin ulcer [No Xanthoma] : no  xanthoma was observed [Oriented To Time, Place, And Person] : oriented to person, place, and time [Affect] : the affect was normal [Mood] : the mood was normal [No Anxiety] : not feeling anxious [5th Left ICS - MCL] : palpated at the 5th LICS in the midclavicular line [Normal] : normal [Normal Rate] : normal [Rhythm Regular] : regular [Normal S1] : normal S1 [Normal S2] : normal S2 [S3] : no S3 [S4] : no S4 [II] : a grade 2 [Right Carotid Bruit] : no bruit heard over the right carotid [Left Carotid Bruit] : no bruit heard over the left carotid [Right Femoral Bruit] : no bruit heard over the right femoral artery [Left Femoral Bruit] : no bruit heard over the left femoral artery [2+] : left 2+ [No Abnormalities] : the abdominal aorta was not enlarged and no bruit was heard [No Pitting Edema] : no pitting edema present

## 2019-11-12 NOTE — ASSESSMENT
[FreeTextEntry1] : Impression:\par 1. Coronary disease with obstructive disease involving the circumflex and right coronary artery status post stenting\par 2. Dyslipidemia at goal on moderate dose statin\par 3. Hypertension well controlled\par \par Plan:\par 1. Continue current regimen

## 2019-11-14 DIAGNOSIS — Z76.89 PERSONS ENCOUNTERING HEALTH SERVICES IN OTHER SPECIFIED CIRCUMSTANCES: ICD-10-CM

## 2019-11-20 ENCOUNTER — RX RENEWAL (OUTPATIENT)
Age: 69
End: 2019-11-20

## 2019-11-20 ENCOUNTER — APPOINTMENT (OUTPATIENT)
Dept: GASTROENTEROLOGY | Facility: CLINIC | Age: 69
End: 2019-11-20
Payer: MEDICARE

## 2019-11-20 ENCOUNTER — APPOINTMENT (OUTPATIENT)
Dept: FAMILY MEDICINE | Facility: CLINIC | Age: 69
End: 2019-11-20
Payer: MEDICARE

## 2019-11-20 VITALS
HEART RATE: 66 BPM | WEIGHT: 164 LBS | HEIGHT: 61 IN | SYSTOLIC BLOOD PRESSURE: 136 MMHG | TEMPERATURE: 97.6 F | DIASTOLIC BLOOD PRESSURE: 76 MMHG | BODY MASS INDEX: 30.96 KG/M2 | OXYGEN SATURATION: 94 %

## 2019-11-20 VITALS
BODY MASS INDEX: 31.18 KG/M2 | SYSTOLIC BLOOD PRESSURE: 140 MMHG | HEART RATE: 76 BPM | WEIGHT: 165 LBS | OXYGEN SATURATION: 95 % | DIASTOLIC BLOOD PRESSURE: 70 MMHG

## 2019-11-20 DIAGNOSIS — J34.89 OTHER SPECIFIED DISORDERS OF NOSE AND NASAL SINUSES: ICD-10-CM

## 2019-11-20 DIAGNOSIS — J31.0 CHRONIC RHINITIS: ICD-10-CM

## 2019-11-20 DIAGNOSIS — K21.9 GASTRO-ESOPHAGEAL REFLUX DISEASE W/OUT ESOPHAGITIS: ICD-10-CM

## 2019-11-20 PROCEDURE — 99204 OFFICE O/P NEW MOD 45 MIN: CPT

## 2019-11-20 PROCEDURE — 99495 TRANSJ CARE MGMT MOD F2F 14D: CPT

## 2019-11-20 RX ORDER — HYDROCORTISONE 25 MG/G
2.5 CREAM TOPICAL
Qty: 1 | Refills: 0 | Status: DISCONTINUED | COMMUNITY
Start: 2019-08-07 | End: 2019-11-20

## 2019-11-20 RX ORDER — GABAPENTIN 400 MG/1
400 CAPSULE ORAL
Qty: 270 | Refills: 1 | Status: DISCONTINUED | COMMUNITY
Start: 2018-10-04 | End: 2019-11-20

## 2019-11-20 RX ORDER — IBUPROFEN 600 MG/1
600 TABLET, FILM COATED ORAL
Qty: 30 | Refills: 0 | Status: DISCONTINUED | COMMUNITY
Start: 2018-09-11 | End: 2019-11-20

## 2019-11-20 RX ORDER — HYDROCORTISONE 25 MG/G
2.5 CREAM TOPICAL
Qty: 1 | Refills: 3 | Status: DISCONTINUED | COMMUNITY
Start: 2019-08-07 | End: 2019-11-20

## 2019-11-20 RX ORDER — GUAIFENESIN AND DEXTROMETHORPHAN HYDROBROMIDE 1200; 60 MG/1; MG/1
60-1200 TABLET, EXTENDED RELEASE ORAL
Qty: 30 | Refills: 3 | Status: DISCONTINUED | COMMUNITY
Start: 2017-05-03 | End: 2019-11-20

## 2019-11-20 RX ORDER — NYSTATIN 100000 [USP'U]/ML
100000 SUSPENSION ORAL
Qty: 240 | Refills: 1 | Status: DISCONTINUED | COMMUNITY
Start: 2017-04-07 | End: 2019-11-20

## 2019-11-20 RX ORDER — CYCLOBENZAPRINE HYDROCHLORIDE 10 MG/1
10 TABLET, FILM COATED ORAL 3 TIMES DAILY
Qty: 60 | Refills: 2 | Status: DISCONTINUED | COMMUNITY
Start: 2017-05-31 | End: 2019-11-20

## 2019-11-20 RX ORDER — IBUPROFEN 800 MG/1
800 TABLET, FILM COATED ORAL 3 TIMES DAILY
Qty: 90 | Refills: 0 | Status: DISCONTINUED | COMMUNITY
Start: 2019-05-08 | End: 2019-11-20

## 2019-11-20 RX ORDER — POLYETHYLENE GLYCOL 3350, SODIUM SULFATE, SODIUM CHLORIDE, POTASSIUM CHLORIDE, ASCORBIC ACID, SODIUM ASCORBATE 7.5-2.691G
100 KIT ORAL
Qty: 1 | Refills: 0 | Status: DISCONTINUED | COMMUNITY
Start: 2017-03-22 | End: 2019-11-20

## 2019-11-20 RX ORDER — SILVER SULFADIAZINE 10 MG/G
1 CREAM TOPICAL DAILY
Qty: 400 | Refills: 2 | Status: DISCONTINUED | COMMUNITY
Start: 2018-03-16 | End: 2019-11-20

## 2019-11-20 NOTE — REASON FOR VISIT
[Follow-Up - Clinic] : a clinic follow-up of [Abnormal Test Result] : an abnormal test result [Coronary Artery Disease] : coronary artery disease

## 2019-11-20 NOTE — PLAN
[FreeTextEntry1] : antibiotic, nasal spray.\par  continue current meds.\par  salt water gargles.\par supportive care, fluids. \par  rest. tylenol prn.\par if no improvement, return to office.

## 2019-11-20 NOTE — HISTORY OF PRESENT ILLNESS
[Moderate] : moderate [Sudden] : suddenly [___ Days ago] : [unfilled] days ago [Paroxysmal] : paroxysmal [Congestion] : congestion [Shortness Of Breath] : shortness of breath [Fatigue] : fatigue [Headache] : headache [At Night] : at night [Rest] : rest [Worsening] : worsening [In Morning] : in the morning [Post-hospitalization from ___ Hospital] : Post-hospitalization from [unfilled] Hospital [Admitted on: ___] : The patient was admitted on [unfilled] [Discharged on ___] : discharged on [unfilled] [Pertinent Labs] : pertinent labs [Discharge Summary] : discharge summary [Med Reconciliation] : medication reconciliation has been completed [Discharge Med List] : discharge medication list [Radiology Findings] : radiology findings [Patient Contacted By: ____] : and contacted by [unfilled] [Cough] : no cough [Sore Throat] : no sore throat [Chills] : no chills [Wheezing] : no wheezing [Fever] : no fever [Earache] : no earache [Anorexia] : no anorexia [FreeTextEntry2] : She is now status post stenting to the right coronary and circumflex coronary. She is now on clopidogrel in addition to her other medications. There was some initial improvement in her fatigue but she states she now remains fatigued. She denies chest discomfort .Her shortness of breath has improved there no palpitations dizziness or syncope. c/o thick nasal drainage for 5 days.

## 2019-11-20 NOTE — REVIEW OF SYSTEMS
[Nasal Discharge] : nasal discharge [Shortness Of Breath] : shortness of breath [Postnasal Drip] : postnasal drip [Dyspnea on Exertion] : dyspnea on exertion [Negative] : Heme/Lymph [Earache] : no earache [Nosebleed] : no nosebleeds [Hearing Loss] : no hearing loss [Hoarseness] : no hoarseness [Sore Throat] : no sore throat [Cough] : no cough [Wheezing] : no wheezing

## 2019-11-20 NOTE — HISTORY OF PRESENT ILLNESS
[de-identified] : The patient is recently underwent cardiac stent and was placed on Plavix. She is now again seen rectal bleeding. She had heavy bleeding in the past after her colonoscopy approximately 2 years ago by Dr. Villafana demonstrating diverticulosis and had an endoscopy last year by Dr. Jackson that was free of significant lesions. She reports occasional constipation and bloating. She often strains during defecation. She is not exercising. Her diet is moderately healthy. Her weight is stable.

## 2019-11-20 NOTE — ASSESSMENT
[FreeTextEntry1] : Impression is that of rectal bleeding patient with known hemorrhoids, a recent colonoscopy with low fiber diet and constipation.\par \par I have spent a great deal of time discussing the role of daily exercise with the patient. We discussed lifestyle modification and the merits of brief, exertional efforts. I have discussed nutrition in great detail including consuming vegetable fibers on a daily basis.  We have also reviewed the benefits of soluble fiber supplementation, including (but not limited to), favorable effects on lipid profile, weight control, decreasing the risk of cardiovascular disease and the salutary effects on colonic microbiota.\par \par She has chronic reflux and has been on a proton pump inhibitor for 15 years. I have counseled her to attempt to step down to an H2 antagonist twice daily.

## 2019-11-20 NOTE — PHYSICAL EXAM
[General Appearance - Alert] : alert [General Appearance - In No Acute Distress] : in no acute distress [Sclera] : the sclera and conjunctiva were normal [PERRL With Normal Accommodation] : pupils were equal in size, round, and reactive to light [Extraocular Movements] : extraocular movements were intact [Oropharynx] : the oropharynx was normal [Outer Ear] : the ears and nose were normal in appearance [Neck Appearance] : the appearance of the neck was normal [Neck Cervical Mass (___cm)] : no neck mass was observed [Jugular Venous Distention Increased] : there was no jugular-venous distention [Thyroid Diffuse Enlargement] : the thyroid was not enlarged [Thyroid Nodule] : there were no palpable thyroid nodules [Auscultation Breath Sounds / Voice Sounds] : lungs were clear to auscultation bilaterally [Heart Rate And Rhythm] : heart rate was normal and rhythm regular [Heart Sounds] : normal S1 and S2 [Heart Sounds Gallop] : no gallops [Murmurs] : no murmurs [Heart Sounds Pericardial Friction Rub] : no pericardial rub [Edema] : there was no peripheral edema [Bowel Sounds] : normal bowel sounds [Abdomen Soft] : soft [Abdomen Tenderness] : non-tender [Abdomen Mass (___ Cm)] : no abdominal mass palpated [Cervical Lymph Nodes Enlarged Posterior Bilaterally] : posterior cervical [Cervical Lymph Nodes Enlarged Anterior Bilaterally] : anterior cervical [No Spinal Tenderness] : no spinal tenderness [No CVA Tenderness] : no ~M costovertebral angle tenderness [Abnormal Walk] : normal gait [Nail Clubbing] : no clubbing  or cyanosis of the fingernails [Musculoskeletal - Swelling] : no joint swelling seen [Motor Tone] : muscle strength and tone were normal [Skin Color & Pigmentation] : normal skin color and pigmentation [Skin Turgor] : normal skin turgor [] : no rash [No Focal Deficits] : no focal deficits [Oriented To Time, Place, And Person] : oriented to person, place, and time [Impaired Insight] : insight and judgment were intact [Affect] : the affect was normal

## 2019-11-20 NOTE — PHYSICAL EXAM
Calm [No Acute Distress] : no acute distress [Well Nourished] : well nourished [Well-Appearing] : well-appearing [Well Developed] : well developed [Normal Sclera/Conjunctiva] : normal sclera/conjunctiva [PERRL] : pupils equal round and reactive to light [Normal Outer Ear/Nose] : the outer ears and nose were normal in appearance [EOMI] : extraocular movements intact [Normal Oropharynx] : the oropharynx was normal [No JVD] : no jugular venous distention [No Lymphadenopathy] : no lymphadenopathy [Supple] : supple [Thyroid Normal, No Nodules] : the thyroid was normal and there were no nodules present [Clear to Auscultation] : lungs were clear to auscultation bilaterally [No Respiratory Distress] : no respiratory distress  [No Accessory Muscle Use] : no accessory muscle use [Normal Rate] : normal rate  [Regular Rhythm] : with a regular rhythm [Normal S1, S2] : normal S1 and S2 [No Murmur] : no murmur heard [No Abdominal Bruit] : a ~M bruit was not heard ~T in the abdomen [No Carotid Bruits] : no carotid bruits [No Varicosities] : no varicosities [Pedal Pulses Present] : the pedal pulses are present [No Edema] : there was no peripheral edema [No Palpable Aorta] : no palpable aorta [No Extremity Clubbing/Cyanosis] : no extremity clubbing/cyanosis [Soft] : abdomen soft [Non Tender] : non-tender [Non-distended] : non-distended [No Masses] : no abdominal mass palpated [No HSM] : no HSM [Normal Bowel Sounds] : normal bowel sounds [Normal Posterior Cervical Nodes] : no posterior cervical lymphadenopathy [No Spinal Tenderness] : no spinal tenderness [No CVA Tenderness] : no CVA  tenderness [Normal Anterior Cervical Nodes] : no anterior cervical lymphadenopathy [No Joint Swelling] : no joint swelling [Grossly Normal Strength/Tone] : grossly normal strength/tone [No Rash] : no rash [Coordination Grossly Intact] : coordination grossly intact [Normal Gait] : normal gait [No Focal Deficits] : no focal deficits [Deep Tendon Reflexes (DTR)] : deep tendon reflexes were 2+ and symmetric [Normal Insight/Judgement] : insight and judgment were intact [Normal Affect] : the affect was normal [39475 - Moderate Complexity requires multiple possible diagnoses and/or the management options, moderate complexity of the medical data (tests, etc.) to be reviewed, and moderate risk of significant complications, morbidity, and/or mortality as well as co] : Moderate Complexity

## 2019-11-20 NOTE — REVIEW OF SYSTEMS
[As Noted in HPI] : as noted in HPI [Negative] : Endocrine [Arthralgias] : arthralgias [Joint Pain] : joint pain

## 2019-12-03 ENCOUNTER — RX RENEWAL (OUTPATIENT)
Age: 69
End: 2019-12-03

## 2019-12-03 ENCOUNTER — APPOINTMENT (OUTPATIENT)
Dept: FAMILY MEDICINE | Facility: CLINIC | Age: 69
End: 2019-12-03
Payer: MEDICARE

## 2019-12-03 VITALS
DIASTOLIC BLOOD PRESSURE: 70 MMHG | SYSTOLIC BLOOD PRESSURE: 110 MMHG | TEMPERATURE: 98 F | OXYGEN SATURATION: 98 % | HEART RATE: 78 BPM | BODY MASS INDEX: 31.37 KG/M2 | WEIGHT: 166 LBS | RESPIRATION RATE: 15 BRPM

## 2019-12-03 DIAGNOSIS — Z87.09 PERSONAL HISTORY OF OTHER DISEASES OF THE RESPIRATORY SYSTEM: ICD-10-CM

## 2019-12-03 DIAGNOSIS — M79.603 PAIN IN ARM, UNSPECIFIED: ICD-10-CM

## 2019-12-03 PROCEDURE — 99214 OFFICE O/P EST MOD 30 MIN: CPT

## 2019-12-03 RX ORDER — DOXYCYCLINE 100 MG/1
100 CAPSULE ORAL
Qty: 20 | Refills: 0 | Status: DISCONTINUED | COMMUNITY
Start: 2019-11-20 | End: 2019-12-03

## 2019-12-03 NOTE — HEALTH RISK ASSESSMENT
[] : No [No] : In the past 12 months have you used drugs other than those required for medical reasons? No [0] : 2) Feeling down, depressed, or hopeless: Not at all (0) [No falls in past year] : Patient reported no falls in the past year [[id number]] : 0

## 2019-12-03 NOTE — PLAN
[FreeTextEntry1] : mammogram deferred to next year. \par Zpak given for travel.\par Monitor BP. if BP running low, will need to adjust BP meds. \par right wrist pain :Epsom salt soak, wrist splint. if pain does not get better, contact office. f/u cardiology.

## 2019-12-03 NOTE — HISTORY OF PRESENT ILLNESS
[FreeTextEntry8] : Here for f/u ASHMIGUEL s/p Coronary stents. Last seen a month ago. c/o right wrist pain near extensor wrist border .  She feels energy slowly coming back. No chest pain ,SOB, fever, chills, cough.

## 2019-12-05 ENCOUNTER — FORM ENCOUNTER (OUTPATIENT)
Age: 69
End: 2019-12-05

## 2019-12-06 ENCOUNTER — OUTPATIENT (OUTPATIENT)
Dept: OUTPATIENT SERVICES | Facility: HOSPITAL | Age: 69
LOS: 1 days | End: 2019-12-06
Payer: MEDICARE

## 2019-12-06 ENCOUNTER — APPOINTMENT (OUTPATIENT)
Dept: ULTRASOUND IMAGING | Facility: HOSPITAL | Age: 69
End: 2019-12-06

## 2019-12-06 DIAGNOSIS — Z96.653 PRESENCE OF ARTIFICIAL KNEE JOINT, BILATERAL: Chronic | ICD-10-CM

## 2019-12-06 DIAGNOSIS — Z98.890 OTHER SPECIFIED POSTPROCEDURAL STATES: Chronic | ICD-10-CM

## 2019-12-06 DIAGNOSIS — M79.603 PAIN IN ARM, UNSPECIFIED: ICD-10-CM

## 2019-12-06 DIAGNOSIS — Z98.89 OTHER SPECIFIED POSTPROCEDURAL STATES: Chronic | ICD-10-CM

## 2019-12-06 DIAGNOSIS — K46.9 UNSPECIFIED ABDOMINAL HERNIA WITHOUT OBSTRUCTION OR GANGRENE: Chronic | ICD-10-CM

## 2019-12-06 PROCEDURE — 93971 EXTREMITY STUDY: CPT | Mod: 26,RT

## 2019-12-06 PROCEDURE — 93971 EXTREMITY STUDY: CPT

## 2019-12-18 ENCOUNTER — APPOINTMENT (OUTPATIENT)
Dept: FAMILY MEDICINE | Facility: CLINIC | Age: 69
End: 2019-12-18

## 2020-01-09 ENCOUNTER — APPOINTMENT (OUTPATIENT)
Dept: FAMILY MEDICINE | Facility: CLINIC | Age: 70
End: 2020-01-09
Payer: COMMERCIAL

## 2020-01-09 ENCOUNTER — APPOINTMENT (OUTPATIENT)
Dept: CARDIOLOGY | Facility: CLINIC | Age: 70
End: 2020-01-09
Payer: COMMERCIAL

## 2020-01-09 ENCOUNTER — NON-APPOINTMENT (OUTPATIENT)
Age: 70
End: 2020-01-09

## 2020-01-09 VITALS
TEMPERATURE: 98.2 F | HEIGHT: 61 IN | HEART RATE: 63 BPM | OXYGEN SATURATION: 94 % | WEIGHT: 164 LBS | DIASTOLIC BLOOD PRESSURE: 82 MMHG | SYSTOLIC BLOOD PRESSURE: 114 MMHG | BODY MASS INDEX: 30.96 KG/M2

## 2020-01-09 VITALS
HEART RATE: 68 BPM | HEIGHT: 61 IN | RESPIRATION RATE: 16 BRPM | DIASTOLIC BLOOD PRESSURE: 72 MMHG | BODY MASS INDEX: 30.96 KG/M2 | WEIGHT: 164 LBS | OXYGEN SATURATION: 95 % | SYSTOLIC BLOOD PRESSURE: 116 MMHG

## 2020-01-09 VITALS — DIASTOLIC BLOOD PRESSURE: 72 MMHG | SYSTOLIC BLOOD PRESSURE: 110 MMHG | HEART RATE: 72 BPM

## 2020-01-09 DIAGNOSIS — K64.9 UNSPECIFIED HEMORRHOIDS: ICD-10-CM

## 2020-01-09 PROCEDURE — 93000 ELECTROCARDIOGRAM COMPLETE: CPT

## 2020-01-09 PROCEDURE — 99214 OFFICE O/P EST MOD 30 MIN: CPT

## 2020-01-09 NOTE — PHYSICAL EXAM
[General Appearance - Well Developed] : well developed [Well Groomed] : well groomed [Normal Appearance] : normal appearance [General Appearance - Well Nourished] : well nourished [No Deformities] : no deformities [General Appearance - In No Acute Distress] : no acute distress [No Oral Pallor] : no oral pallor [Normal Oral Mucosa] : normal oral mucosa [Normal Jugular Venous A Waves Present] : normal jugular venous A waves present [No Oral Cyanosis] : no oral cyanosis [Normal Jugular Venous V Waves Present] : normal jugular venous V waves present [No Jugular Venous Triana A Waves] : no jugular venous triana A waves [Respiration, Rhythm And Depth] : normal respiratory rhythm and effort [Exaggerated Use Of Accessory Muscles For Inspiration] : no accessory muscle use [Auscultation Breath Sounds / Voice Sounds] : lungs were clear to auscultation bilaterally [Abdomen Soft] : soft [Abdomen Tenderness] : non-tender [Abdomen Mass (___ Cm)] : no abdominal mass palpated [Abnormal Walk] : normal gait [Nail Clubbing] : no clubbing of the fingernails [Gait - Sufficient For Exercise Testing] : the gait was sufficient for exercise testing [Cyanosis, Localized] : no localized cyanosis [Petechial Hemorrhages (___cm)] : no petechial hemorrhages [Skin Color & Pigmentation] : normal skin color and pigmentation [No Venous Stasis] : no venous stasis [] : no rash [Skin Lesions] : no skin lesions [No Skin Ulcers] : no skin ulcer [No Xanthoma] : no  xanthoma was observed [No Anxiety] : not feeling anxious [Oriented To Time, Place, And Person] : oriented to person, place, and time [Affect] : the affect was normal [Mood] : the mood was normal [5th Left ICS - MCL] : palpated at the 5th LICS in the midclavicular line [Normal] : normal [Rhythm Regular] : regular [Normal S1] : normal S1 [Normal Rate] : normal [Normal S2] : normal S2 [S4] : no S4 [S3] : no S3 [II] : a grade 2 [Right Carotid Bruit] : no bruit heard over the right carotid [Left Carotid Bruit] : no bruit heard over the left carotid [Left Femoral Bruit] : no bruit heard over the left femoral artery [Right Femoral Bruit] : no bruit heard over the right femoral artery [2+] : left 2+ [No Abnormalities] : the abdominal aorta was not enlarged and no bruit was heard [No Pitting Edema] : no pitting edema present

## 2020-01-09 NOTE — HEALTH RISK ASSESSMENT
[] : No [No] : In the past 12 months have you used drugs other than those required for medical reasons? No [No falls in past year] : Patient reported no falls in the past year [[id number]] : 0 [Audit-CScore] : 0 [0] : 2) Feeling down, depressed, or hopeless: Not at all (0)

## 2020-01-09 NOTE — ASSESSMENT
[FreeTextEntry1] : Impression:\par 1. Coronary disease with obstructive disease involving the circumflex and right coronary artery status post stenting\par 2. Dyslipidemia at goal on moderate dose statin at last check\par 3. Hypertension well controlled\par \par Plan:\par 1. Continue current regimen

## 2020-01-09 NOTE — HISTORY OF PRESENT ILLNESS
[FreeTextEntry1] : Please see history of present illness [de-identified] : Patient is a 69-year-old female, who presents today for followup appointment. A couple chest pain, resolved status post stent placement. Patient presently on Plavix. Unfortunately, she does have occasional hemorrhoidal bleeding. Medical history significant for ischemic heart disease. Patient has never had a heart attack, hypertension, hyperlipidemia, and hemorrhoids. Patient also has a long history of degenerative joint disease with chronic back and lower extremity pain.

## 2020-01-09 NOTE — ASSESSMENT
[FreeTextEntry1] : Assessment and plan:\par \par 1. Hypertension excellent blood pressure control. Continue present medical management. No change.\par \par 2. Status post stent placement, secondary to ischemic heart disease. Continue Plavix 75 mg p.o. daily. Patient does have available nitroglycerin sublingual only if needed. She will notify me if she does.\par \par 3. Hypothyroidism. Patient doing well and tolerating levothyroxine 125 mcg p.o. daily.\par \par 4. Hyperlipidemia. Patient doing well and tolerating lovastatin 20 mg at bedtime.\par \par 5. Hyperuricemia, well controlled with allopurinol 300 mg p.o. daily.\par \par 6. Continue present medical management. No change. Patient will be seeing cardiology later today. Unfortunately, she does have occasional hemorrhoidal bleeding, but since she just recently had a stent. I don't feel comfortable stopping Plavix at this time as long as the bleeding is contained.

## 2020-01-09 NOTE — REVIEW OF SYSTEMS
[Chest Pain] : no chest pain [Fatigue] : fatigue [Palpitations] : no palpitations [Leg Claudication] : no leg claudication [Lower Ext Edema] : no lower extremity edema [Orthopnea] : no orthopnea [Abdominal Pain] : no abdominal pain [Nausea] : no nausea [Constipation] : no constipation [Diarrhea] : diarrhea [Heartburn] : no heartburn [Vomiting] : no vomiting [Melena] : no melena [Joint Pain] : joint pain [Joint Stiffness] : joint stiffness [Joint Swelling] : no joint swelling [Back Pain] : back pain [Muscle Weakness] : no muscle weakness [Muscle Pain] : no muscle pain [Negative] : Heme/Lymph [FreeTextEntry7] : Bright red blood rectal bleeding

## 2020-01-09 NOTE — COUNSELING
[Fall prevention counseling provided] : Fall prevention counseling provided [Adequate lighting] : Adequate lighting [No throw rugs] : No throw rugs [Use proper foot wear] : Use proper foot wear [Behavioral health counseling provided] : Behavioral health counseling provided [Sleep ___ hours/day] : Sleep [unfilled] hours/day [Engage in a relaxing activity] : Engage in a relaxing activity [AUDIT-C Screening administered and reviewed] : AUDIT-C Screening administered and reviewed [Plan in advance] : Plan in advance [Potential consequences of obesity discussed] : Potential consequences of obesity discussed [Benefits of weight loss discussed] : Benefits of weight loss discussed [Structured Weight Management Program suggested:] : Structured weight management program suggested [Encouraged to maintain food diary] : Encouraged to maintain food diary [Encouraged to use exercise tracking device] : Encouraged to use exercise tracking device [Encouraged to increase physical activity] : Encouraged to increase physical activity [Decrease Portions] : decrease portions [Weigh Self Weekly] : weigh self weekly [Target Wt Loss Goal ___] : Weight Loss Goals: Target weight loss goal [unfilled] lbs [____ min/wk Activity] : [unfilled] min/wk activity [Keep Food Diary] : keep food diary [Good understanding] : Patient has a good understanding of disease, goals and obesity follow-up plan

## 2020-01-09 NOTE — REASON FOR VISIT
[Follow-Up - Clinic] : a clinic follow-up of [Coronary Artery Disease] : coronary artery disease [Hyperlipidemia] : hyperlipidemia [Hypertension] : hypertension [FreeTextEntry1] : Patient returns for followup. Feeling well. Offers no complaints of chest discomfort shortness of breath palpitations dizziness or syncope.She states that she is much less fatigued since her stents.\par

## 2020-01-09 NOTE — PHYSICAL EXAM
[No Acute Distress] : no acute distress [Well Nourished] : well nourished [Well-Appearing] : well-appearing [Normal Voice/Communication] : normal voice/communication [Well Developed] : well developed [No Carotid Bruits] : no carotid bruits [Pedal Pulses Present] : the pedal pulses are present [No Abdominal Bruit] : a ~M bruit was not heard ~T in the abdomen [No Varicosities] : no varicosities [No Palpable Aorta] : no palpable aorta [No Extremity Clubbing/Cyanosis] : no extremity clubbing/cyanosis [No Edema] : there was no peripheral edema [Normal Affect] : the affect was normal [Speech Grossly Normal] : speech grossly normal [Memory Grossly Normal] : memory grossly normal [Normal Mood] : the mood was normal [Normal Insight/Judgement] : insight and judgment were intact [Alert and Oriented x3] : oriented to person, place, and time [Normal] : affect was normal and insight and judgment were intact [FreeTextEntry1] : GI [de-identified] : No guarding or rebound [de-identified] : L/S spasm [de-identified] : diffuse joint pain without swelling

## 2020-01-13 LAB
ALBUMIN SERPL ELPH-MCNC: 4.2 G/DL
ALP BLD-CCNC: 75 U/L
ALT SERPL-CCNC: 9 U/L
AST SERPL-CCNC: 19 U/L
BILIRUB DIRECT SERPL-MCNC: 0.1 MG/DL
BILIRUB INDIRECT SERPL-MCNC: 0.2 MG/DL
BILIRUB SERPL-MCNC: 0.3 MG/DL
CHOLEST SERPL-MCNC: 147 MG/DL
CHOLEST/HDLC SERPL: 2.7 RATIO
CK SERPL-CCNC: 126 U/L
HDLC SERPL-MCNC: 55 MG/DL
LDLC SERPL CALC-MCNC: 76 MG/DL
PROT SERPL-MCNC: 6.7 G/DL
TRIGL SERPL-MCNC: 81 MG/DL

## 2020-01-26 ENCOUNTER — INPATIENT (INPATIENT)
Facility: HOSPITAL | Age: 70
LOS: 2 days | Discharge: HOME HEALTH | DRG: 556 | End: 2020-01-29
Attending: HOSPITALIST | Admitting: HOSPITALIST
Payer: MEDICARE

## 2020-01-26 VITALS
DIASTOLIC BLOOD PRESSURE: 67 MMHG | RESPIRATION RATE: 18 BRPM | WEIGHT: 164.91 LBS | TEMPERATURE: 99 F | OXYGEN SATURATION: 93 % | SYSTOLIC BLOOD PRESSURE: 116 MMHG | HEART RATE: 78 BPM

## 2020-01-26 DIAGNOSIS — N17.9 ACUTE KIDNEY FAILURE, UNSPECIFIED: ICD-10-CM

## 2020-01-26 DIAGNOSIS — Z96.653 PRESENCE OF ARTIFICIAL KNEE JOINT, BILATERAL: Chronic | ICD-10-CM

## 2020-01-26 DIAGNOSIS — Z98.89 OTHER SPECIFIED POSTPROCEDURAL STATES: Chronic | ICD-10-CM

## 2020-01-26 DIAGNOSIS — Z98.890 OTHER SPECIFIED POSTPROCEDURAL STATES: Chronic | ICD-10-CM

## 2020-01-26 DIAGNOSIS — K46.9 UNSPECIFIED ABDOMINAL HERNIA WITHOUT OBSTRUCTION OR GANGRENE: Chronic | ICD-10-CM

## 2020-01-26 LAB
ALBUMIN SERPL ELPH-MCNC: 3.4 G/DL — SIGNIFICANT CHANGE UP (ref 3.3–5)
ALBUMIN SERPL ELPH-MCNC: 3.9 G/DL — SIGNIFICANT CHANGE UP (ref 3.3–5)
ALP SERPL-CCNC: 78 U/L — SIGNIFICANT CHANGE UP (ref 40–120)
ALP SERPL-CCNC: 92 U/L — SIGNIFICANT CHANGE UP (ref 40–120)
ALT FLD-CCNC: 13 U/L — SIGNIFICANT CHANGE UP (ref 10–45)
ALT FLD-CCNC: 18 U/L — SIGNIFICANT CHANGE UP (ref 10–45)
ANION GAP SERPL CALC-SCNC: 12 MMOL/L — SIGNIFICANT CHANGE UP (ref 5–17)
ANION GAP SERPL CALC-SCNC: 14 MMOL/L — SIGNIFICANT CHANGE UP (ref 5–17)
APTT BLD: 31.9 SEC — SIGNIFICANT CHANGE UP (ref 27.5–36.3)
AST SERPL-CCNC: 27 U/L — SIGNIFICANT CHANGE UP (ref 10–40)
AST SERPL-CCNC: 33 U/L — SIGNIFICANT CHANGE UP (ref 10–40)
BASOPHILS # BLD AUTO: 0.25 K/UL — HIGH (ref 0–0.2)
BASOPHILS NFR BLD AUTO: 1.8 % — SIGNIFICANT CHANGE UP (ref 0–2)
BILIRUB SERPL-MCNC: 0.4 MG/DL — SIGNIFICANT CHANGE UP (ref 0.2–1.2)
BILIRUB SERPL-MCNC: 0.5 MG/DL — SIGNIFICANT CHANGE UP (ref 0.2–1.2)
BUN SERPL-MCNC: 27 MG/DL — HIGH (ref 7–23)
BUN SERPL-MCNC: 30 MG/DL — HIGH (ref 7–23)
CALCIUM SERPL-MCNC: 8.4 MG/DL — SIGNIFICANT CHANGE UP (ref 8.4–10.5)
CALCIUM SERPL-MCNC: 9.2 MG/DL — SIGNIFICANT CHANGE UP (ref 8.4–10.5)
CHLORIDE SERPL-SCNC: 101 MMOL/L — SIGNIFICANT CHANGE UP (ref 96–108)
CHLORIDE SERPL-SCNC: 97 MMOL/L — SIGNIFICANT CHANGE UP (ref 96–108)
CO2 SERPL-SCNC: 23 MMOL/L — SIGNIFICANT CHANGE UP (ref 22–31)
CO2 SERPL-SCNC: 24 MMOL/L — SIGNIFICANT CHANGE UP (ref 22–31)
CREAT SERPL-MCNC: 1.35 MG/DL — HIGH (ref 0.5–1.3)
CREAT SERPL-MCNC: 1.63 MG/DL — HIGH (ref 0.5–1.3)
EOSINOPHIL # BLD AUTO: 0 K/UL — SIGNIFICANT CHANGE UP (ref 0–0.5)
EOSINOPHIL NFR BLD AUTO: 0 % — SIGNIFICANT CHANGE UP (ref 0–6)
FLU A RESULT: SIGNIFICANT CHANGE UP
FLU A RESULT: SIGNIFICANT CHANGE UP
FLUAV AG NPH QL: SIGNIFICANT CHANGE UP
FLUBV AG NPH QL: SIGNIFICANT CHANGE UP
GLUCOSE SERPL-MCNC: 97 MG/DL — SIGNIFICANT CHANGE UP (ref 70–99)
GLUCOSE SERPL-MCNC: 98 MG/DL — SIGNIFICANT CHANGE UP (ref 70–99)
HCT VFR BLD CALC: 34.7 % — SIGNIFICANT CHANGE UP (ref 34.5–45)
HCT VFR BLD CALC: 40.2 % — SIGNIFICANT CHANGE UP (ref 34.5–45)
HGB BLD-MCNC: 10.4 G/DL — LOW (ref 11.5–15.5)
HGB BLD-MCNC: 11.8 G/DL — SIGNIFICANT CHANGE UP (ref 11.5–15.5)
INR BLD: 1.03 RATIO — SIGNIFICANT CHANGE UP (ref 0.88–1.16)
LYMPHOCYTES # BLD AUTO: 0.86 K/UL — LOW (ref 1–3.3)
LYMPHOCYTES # BLD AUTO: 6.2 % — LOW (ref 13–44)
MANUAL SMEAR VERIFICATION: SIGNIFICANT CHANGE UP
MCHC RBC-ENTMCNC: 26.8 PG — LOW (ref 27–34)
MCHC RBC-ENTMCNC: 26.9 PG — LOW (ref 27–34)
MCHC RBC-ENTMCNC: 29.4 GM/DL — LOW (ref 32–36)
MCHC RBC-ENTMCNC: 30 GM/DL — LOW (ref 32–36)
MCV RBC AUTO: 89.9 FL — SIGNIFICANT CHANGE UP (ref 80–100)
MCV RBC AUTO: 91.2 FL — SIGNIFICANT CHANGE UP (ref 80–100)
METAMYELOCYTES # FLD: 0.9 % — HIGH (ref 0–0)
MONOCYTES # BLD AUTO: 0.86 K/UL — SIGNIFICANT CHANGE UP (ref 0–0.9)
MONOCYTES NFR BLD AUTO: 6.2 % — SIGNIFICANT CHANGE UP (ref 2–14)
NEUTROPHILS # BLD AUTO: 10.52 K/UL — HIGH (ref 1.8–7.4)
NEUTROPHILS NFR BLD AUTO: 75.9 % — SIGNIFICANT CHANGE UP (ref 43–77)
NRBC # BLD: 0 /100 WBCS — SIGNIFICANT CHANGE UP (ref 0–0)
PLAT MORPH BLD: NORMAL — SIGNIFICANT CHANGE UP
PLATELET # BLD AUTO: 221 K/UL — SIGNIFICANT CHANGE UP (ref 150–400)
PLATELET # BLD AUTO: 270 K/UL — SIGNIFICANT CHANGE UP (ref 150–400)
POTASSIUM SERPL-MCNC: 4.3 MMOL/L — SIGNIFICANT CHANGE UP (ref 3.5–5.3)
POTASSIUM SERPL-MCNC: 4.7 MMOL/L — SIGNIFICANT CHANGE UP (ref 3.5–5.3)
POTASSIUM SERPL-SCNC: 4.3 MMOL/L — SIGNIFICANT CHANGE UP (ref 3.5–5.3)
POTASSIUM SERPL-SCNC: 4.7 MMOL/L — SIGNIFICANT CHANGE UP (ref 3.5–5.3)
PROMYELOCYTES # FLD: 2.7 % — HIGH (ref 0–0)
PROT SERPL-MCNC: 6.6 G/DL — SIGNIFICANT CHANGE UP (ref 6–8.3)
PROT SERPL-MCNC: 7.3 G/DL — SIGNIFICANT CHANGE UP (ref 6–8.3)
PROTHROM AB SERPL-ACNC: 11.8 SEC — SIGNIFICANT CHANGE UP (ref 10–12.9)
RBC # BLD: 3.86 M/UL — SIGNIFICANT CHANGE UP (ref 3.8–5.2)
RBC # BLD: 4.41 M/UL — SIGNIFICANT CHANGE UP (ref 3.8–5.2)
RBC # FLD: 14.6 % — HIGH (ref 10.3–14.5)
RBC # FLD: 14.6 % — HIGH (ref 10.3–14.5)
RBC BLD AUTO: NORMAL — SIGNIFICANT CHANGE UP
RSV RESULT: SIGNIFICANT CHANGE UP
RSV RNA RESP QL NAA+PROBE: SIGNIFICANT CHANGE UP
SODIUM SERPL-SCNC: 135 MMOL/L — SIGNIFICANT CHANGE UP (ref 135–145)
SODIUM SERPL-SCNC: 136 MMOL/L — SIGNIFICANT CHANGE UP (ref 135–145)
TROPONIN T, HIGH SENSITIVITY RESULT: 21 NG/L — SIGNIFICANT CHANGE UP (ref 0–51)
VARIANT LYMPHS # BLD: 6.3 % — HIGH (ref 0–6)
WBC # BLD: 13.86 K/UL — HIGH (ref 3.8–10.5)
WBC # BLD: 13.88 K/UL — HIGH (ref 3.8–10.5)
WBC # FLD AUTO: 13.86 K/UL — HIGH (ref 3.8–10.5)
WBC # FLD AUTO: 13.88 K/UL — HIGH (ref 3.8–10.5)

## 2020-01-26 PROCEDURE — 70450 CT HEAD/BRAIN W/O DYE: CPT | Mod: 26

## 2020-01-26 PROCEDURE — 99285 EMERGENCY DEPT VISIT HI MDM: CPT

## 2020-01-26 PROCEDURE — 93010 ELECTROCARDIOGRAM REPORT: CPT

## 2020-01-26 PROCEDURE — 71045 X-RAY EXAM CHEST 1 VIEW: CPT | Mod: 26

## 2020-01-26 RX ORDER — ACETAMINOPHEN 500 MG
975 TABLET ORAL ONCE
Refills: 0 | Status: DISCONTINUED | OUTPATIENT
Start: 2020-01-26 | End: 2020-01-26

## 2020-01-26 RX ORDER — METOCLOPRAMIDE HCL 10 MG
10 TABLET ORAL ONCE
Refills: 0 | Status: COMPLETED | OUTPATIENT
Start: 2020-01-26 | End: 2020-01-26

## 2020-01-26 RX ORDER — SODIUM CHLORIDE 9 MG/ML
1000 INJECTION INTRAMUSCULAR; INTRAVENOUS; SUBCUTANEOUS ONCE
Refills: 0 | Status: COMPLETED | OUTPATIENT
Start: 2020-01-26 | End: 2020-01-26

## 2020-01-26 RX ORDER — ACETAMINOPHEN 500 MG
975 TABLET ORAL ONCE
Refills: 0 | Status: COMPLETED | OUTPATIENT
Start: 2020-01-26 | End: 2020-01-26

## 2020-01-26 RX ORDER — OXYCODONE HYDROCHLORIDE 5 MG/1
5 TABLET ORAL ONCE
Refills: 0 | Status: DISCONTINUED | OUTPATIENT
Start: 2020-01-26 | End: 2020-01-26

## 2020-01-26 RX ADMIN — SODIUM CHLORIDE 1000 MILLILITER(S): 9 INJECTION INTRAMUSCULAR; INTRAVENOUS; SUBCUTANEOUS at 22:21

## 2020-01-26 RX ADMIN — SODIUM CHLORIDE 1000 MILLILITER(S): 9 INJECTION INTRAMUSCULAR; INTRAVENOUS; SUBCUTANEOUS at 18:12

## 2020-01-26 RX ADMIN — OXYCODONE HYDROCHLORIDE 5 MILLIGRAM(S): 5 TABLET ORAL at 21:39

## 2020-01-26 RX ADMIN — Medication 10 MILLIGRAM(S): at 18:12

## 2020-01-26 RX ADMIN — Medication 975 MILLIGRAM(S): at 22:21

## 2020-01-26 RX ADMIN — SODIUM CHLORIDE 1000 MILLILITER(S): 9 INJECTION INTRAMUSCULAR; INTRAVENOUS; SUBCUTANEOUS at 19:48

## 2020-01-26 NOTE — ED PROVIDER NOTE - PROGRESS NOTE DETAILS
Initial labs with leukocytosis, abnormal cmp as well. Will IV hydrate given signs of dehydration. ?viral syndrome as cause of myalgias and lab results. Repeat labs/trop s/p 2L IVF Pt reports persistent myaglias, not able to ambulate. Attempted to walk to bathroom, but required full assist to stand. Will admit for BETTY, weakness. Afebrile but given leukocytosis, Flu/UA ordered. Accepted for admission by Hospitalist

## 2020-01-26 NOTE — ED ADULT TRIAGE NOTE - CHIEF COMPLAINT QUOTE
chest pain since last night; hx of cardiac stents on asa and plavix  headache/neck pain/back pain x5 days

## 2020-01-26 NOTE — ED PROVIDER NOTE - CLINICAL SUMMARY MEDICAL DECISION MAKING FREE TEXT BOX
No Retinal holes, Tears or Detachments. Laura Mclaughlin MD - Attending Physician: Pt here with myalgias, headache/cp, otherwise nonspecific complaints. Nonfocal exam, but difficult historian and difficulty complying with exam. Labs, Xr, CT for initial eval

## 2020-01-26 NOTE — ED PROVIDER NOTE - CARE PLAN
Principal Discharge DX:	Acute kidney injury  Secondary Diagnosis:	Dehydration  Secondary Diagnosis:	Generalized weakness

## 2020-01-26 NOTE — ED ADULT NURSE NOTE - NSIMPLEMENTINTERV_GEN_ALL_ED
Implemented All Fall Risk Interventions:  [place] to call system. Call bell, personal items and telephone within reach. Instruct patient to call for assistance. Room bathroom lighting operational. Non-slip footwear when patient is off stretcher. Physically safe environment: no spills, clutter or unnecessary equipment. Stretcher in lowest position, wheels locked, appropriate side rails in place. Provide visual cue, wrist band, yellow gown, etc. Monitor gait and stability. Monitor for mental status changes and reorient to person, place, and time. Review medications for side effects contributing to fall risk. Reinforce activity limits and safety measures with patient and family.

## 2020-01-26 NOTE — ED PROVIDER NOTE - GASTROINTESTINAL, MLM
Abdomen soft, non-tender, no guarding. patient initially jumps on exam but distractable and nontender

## 2020-01-26 NOTE — ED ADULT NURSE NOTE - OBJECTIVE STATEMENT
69 yr old female to ED via wheelchair, accomp by friend, with h/o HTN, HLD, Stentx2, GERD Hypothyroid, c/o head pain rad to neck, then chest and now back. C/o worsening SOB with increased pain on movement. Took oxycodone at 5pm for chronic back pain Hx back surgery and mult ortho procedures. Alert and orientedx3 On Plavix. had similar pain with stent placement. Sister  from cardiac issue. Res even and nonlab at this time. Denies abd pain Denies fever or chills Denies N/V/D. No recent travel.

## 2020-01-26 NOTE — ED PROVIDER NOTE - OBJECTIVE STATEMENT
69 PMHX HTN, HLD, CAD with stents, Hypothyroidism presents to the ED for multiple complaints. patient states 5 days ago started having posterior headache and neck pain for which she tried heat pads and icy hot without relief, also tried oxy that she takes for chronic pain. headache persists. then states she started having chest pain and sob last night. pain is centrally located, has been constant and unchanging. no fever/chills/abd pain or uri sx. friend at beside also notes her speech was abnormal both lastnight and today but cannot describe it further states she was "incoherent"

## 2020-01-26 NOTE — ED PROVIDER NOTE - ATTENDING CONTRIBUTION TO CARE
Laura Mclaughlin MD - Attending Physician: I have personally seen and examined this patient. I have discussed the case with the ACP. I have reviewed all pertinent clinical information, including history, physical exam, plan and the ACP’s note and agree except as noted. See MDM

## 2020-01-27 DIAGNOSIS — D72.829 ELEVATED WHITE BLOOD CELL COUNT, UNSPECIFIED: ICD-10-CM

## 2020-01-27 DIAGNOSIS — I25.10 ATHEROSCLEROTIC HEART DISEASE OF NATIVE CORONARY ARTERY WITHOUT ANGINA PECTORIS: ICD-10-CM

## 2020-01-27 DIAGNOSIS — R53.1 WEAKNESS: ICD-10-CM

## 2020-01-27 DIAGNOSIS — Z29.9 ENCOUNTER FOR PROPHYLACTIC MEASURES, UNSPECIFIED: ICD-10-CM

## 2020-01-27 DIAGNOSIS — N17.9 ACUTE KIDNEY FAILURE, UNSPECIFIED: ICD-10-CM

## 2020-01-27 DIAGNOSIS — Z02.9 ENCOUNTER FOR ADMINISTRATIVE EXAMINATIONS, UNSPECIFIED: ICD-10-CM

## 2020-01-27 LAB
ALBUMIN SERPL ELPH-MCNC: 3.4 G/DL — SIGNIFICANT CHANGE UP (ref 3.3–5)
ALP SERPL-CCNC: 92 U/L — SIGNIFICANT CHANGE UP (ref 40–120)
ALT FLD-CCNC: 17 U/L — SIGNIFICANT CHANGE UP (ref 10–45)
ANION GAP SERPL CALC-SCNC: 13 MMOL/L — SIGNIFICANT CHANGE UP (ref 5–17)
APPEARANCE UR: CLEAR — SIGNIFICANT CHANGE UP
APPEARANCE UR: CLEAR — SIGNIFICANT CHANGE UP
AST SERPL-CCNC: 28 U/L — SIGNIFICANT CHANGE UP (ref 10–40)
B PERT DNA SPEC QL NAA+PROBE: SIGNIFICANT CHANGE UP
BACTERIA # UR AUTO: NEGATIVE — SIGNIFICANT CHANGE UP
BILIRUB SERPL-MCNC: 0.6 MG/DL — SIGNIFICANT CHANGE UP (ref 0.2–1.2)
BILIRUB UR-MCNC: NEGATIVE — SIGNIFICANT CHANGE UP
BILIRUB UR-MCNC: NEGATIVE — SIGNIFICANT CHANGE UP
BUN SERPL-MCNC: 18 MG/DL — SIGNIFICANT CHANGE UP (ref 7–23)
C PNEUM DNA SPEC QL NAA+PROBE: SIGNIFICANT CHANGE UP
CALCIUM SERPL-MCNC: 9 MG/DL — SIGNIFICANT CHANGE UP (ref 8.4–10.5)
CHLORIDE SERPL-SCNC: 104 MMOL/L — SIGNIFICANT CHANGE UP (ref 96–108)
CK SERPL-CCNC: 58 U/L — SIGNIFICANT CHANGE UP (ref 25–170)
CO2 SERPL-SCNC: 24 MMOL/L — SIGNIFICANT CHANGE UP (ref 22–31)
COLOR SPEC: SIGNIFICANT CHANGE UP
COLOR SPEC: SIGNIFICANT CHANGE UP
CREAT SERPL-MCNC: 0.97 MG/DL — SIGNIFICANT CHANGE UP (ref 0.5–1.3)
DIFF PNL FLD: NEGATIVE — SIGNIFICANT CHANGE UP
DIFF PNL FLD: NEGATIVE — SIGNIFICANT CHANGE UP
EPI CELLS # UR: 0 /HPF — SIGNIFICANT CHANGE UP
FLUAV H1 2009 PAND RNA SPEC QL NAA+PROBE: SIGNIFICANT CHANGE UP
FLUAV H1 RNA SPEC QL NAA+PROBE: SIGNIFICANT CHANGE UP
FLUAV H3 RNA SPEC QL NAA+PROBE: SIGNIFICANT CHANGE UP
FLUAV SUBTYP SPEC NAA+PROBE: SIGNIFICANT CHANGE UP
FLUBV RNA SPEC QL NAA+PROBE: SIGNIFICANT CHANGE UP
GLUCOSE SERPL-MCNC: 90 MG/DL — SIGNIFICANT CHANGE UP (ref 70–99)
GLUCOSE UR QL: NEGATIVE — SIGNIFICANT CHANGE UP
GLUCOSE UR QL: NEGATIVE — SIGNIFICANT CHANGE UP
HADV DNA SPEC QL NAA+PROBE: SIGNIFICANT CHANGE UP
HCOV PNL SPEC NAA+PROBE: SIGNIFICANT CHANGE UP
HCT VFR BLD CALC: 38.9 % — SIGNIFICANT CHANGE UP (ref 34.5–45)
HGB BLD-MCNC: 11.4 G/DL — LOW (ref 11.5–15.5)
HMPV RNA SPEC QL NAA+PROBE: SIGNIFICANT CHANGE UP
HPIV1 RNA SPEC QL NAA+PROBE: SIGNIFICANT CHANGE UP
HPIV2 RNA SPEC QL NAA+PROBE: SIGNIFICANT CHANGE UP
HPIV3 RNA SPEC QL NAA+PROBE: SIGNIFICANT CHANGE UP
HPIV4 RNA SPEC QL NAA+PROBE: SIGNIFICANT CHANGE UP
HYALINE CASTS # UR AUTO: 0 /LPF — SIGNIFICANT CHANGE UP (ref 0–2)
KETONES UR-MCNC: NEGATIVE — SIGNIFICANT CHANGE UP
KETONES UR-MCNC: SIGNIFICANT CHANGE UP
LEUKOCYTE ESTERASE UR-ACNC: NEGATIVE — SIGNIFICANT CHANGE UP
LEUKOCYTE ESTERASE UR-ACNC: NEGATIVE — SIGNIFICANT CHANGE UP
MCHC RBC-ENTMCNC: 26.3 PG — LOW (ref 27–34)
MCHC RBC-ENTMCNC: 29.3 GM/DL — LOW (ref 32–36)
MCV RBC AUTO: 89.6 FL — SIGNIFICANT CHANGE UP (ref 80–100)
NITRITE UR-MCNC: NEGATIVE — SIGNIFICANT CHANGE UP
NITRITE UR-MCNC: NEGATIVE — SIGNIFICANT CHANGE UP
NRBC # BLD: 0 /100 WBCS — SIGNIFICANT CHANGE UP (ref 0–0)
PH UR: 6.5 — SIGNIFICANT CHANGE UP (ref 5–8)
PH UR: 6.5 — SIGNIFICANT CHANGE UP (ref 5–8)
PLATELET # BLD AUTO: 222 K/UL — SIGNIFICANT CHANGE UP (ref 150–400)
POTASSIUM SERPL-MCNC: 4 MMOL/L — SIGNIFICANT CHANGE UP (ref 3.5–5.3)
POTASSIUM SERPL-SCNC: 4 MMOL/L — SIGNIFICANT CHANGE UP (ref 3.5–5.3)
PROT SERPL-MCNC: 6.9 G/DL — SIGNIFICANT CHANGE UP (ref 6–8.3)
PROT UR-MCNC: NEGATIVE — SIGNIFICANT CHANGE UP
PROT UR-MCNC: SIGNIFICANT CHANGE UP
RAPID RVP RESULT: SIGNIFICANT CHANGE UP
RBC # BLD: 4.34 M/UL — SIGNIFICANT CHANGE UP (ref 3.8–5.2)
RBC # FLD: 14.6 % — HIGH (ref 10.3–14.5)
RBC CASTS # UR COMP ASSIST: 1 /HPF — SIGNIFICANT CHANGE UP (ref 0–4)
RSV RNA SPEC QL NAA+PROBE: SIGNIFICANT CHANGE UP
RV+EV RNA SPEC QL NAA+PROBE: SIGNIFICANT CHANGE UP
SODIUM SERPL-SCNC: 141 MMOL/L — SIGNIFICANT CHANGE UP (ref 135–145)
SP GR SPEC: 1.01 — SIGNIFICANT CHANGE UP (ref 1.01–1.02)
SP GR SPEC: 1.01 — SIGNIFICANT CHANGE UP (ref 1.01–1.02)
UROBILINOGEN FLD QL: NEGATIVE — SIGNIFICANT CHANGE UP
UROBILINOGEN FLD QL: NEGATIVE — SIGNIFICANT CHANGE UP
WBC # BLD: 13.03 K/UL — HIGH (ref 3.8–10.5)
WBC # FLD AUTO: 13.03 K/UL — HIGH (ref 3.8–10.5)
WBC UR QL: 0 /HPF — SIGNIFICANT CHANGE UP (ref 0–5)

## 2020-01-27 PROCEDURE — 99223 1ST HOSP IP/OBS HIGH 75: CPT | Mod: GC

## 2020-01-27 RX ORDER — DILTIAZEM HCL 120 MG
240 CAPSULE, EXT RELEASE 24 HR ORAL DAILY
Refills: 0 | Status: DISCONTINUED | OUTPATIENT
Start: 2020-01-27 | End: 2020-01-29

## 2020-01-27 RX ORDER — ACETAMINOPHEN 500 MG
1000 TABLET ORAL EVERY 8 HOURS
Refills: 0 | Status: DISCONTINUED | OUTPATIENT
Start: 2020-01-27 | End: 2020-01-29

## 2020-01-27 RX ORDER — GABAPENTIN 400 MG/1
400 CAPSULE ORAL
Refills: 0 | Status: DISCONTINUED | OUTPATIENT
Start: 2020-01-27 | End: 2020-01-29

## 2020-01-27 RX ORDER — ACETAMINOPHEN 500 MG
650 TABLET ORAL EVERY 6 HOURS
Refills: 0 | Status: DISCONTINUED | OUTPATIENT
Start: 2020-01-27 | End: 2020-01-27

## 2020-01-27 RX ORDER — SODIUM CHLORIDE 9 MG/ML
1000 INJECTION, SOLUTION INTRAVENOUS
Refills: 0 | Status: DISCONTINUED | OUTPATIENT
Start: 2020-01-27 | End: 2020-01-29

## 2020-01-27 RX ORDER — OXYCODONE HYDROCHLORIDE 5 MG/1
30 TABLET ORAL EVERY 4 HOURS
Refills: 0 | Status: DISCONTINUED | OUTPATIENT
Start: 2020-01-27 | End: 2020-01-27

## 2020-01-27 RX ORDER — OXYCODONE HYDROCHLORIDE 5 MG/1
15 TABLET ORAL EVERY 4 HOURS
Refills: 0 | Status: DISCONTINUED | OUTPATIENT
Start: 2020-01-27 | End: 2020-01-29

## 2020-01-27 RX ORDER — ENOXAPARIN SODIUM 100 MG/ML
40 INJECTION SUBCUTANEOUS DAILY
Refills: 0 | Status: DISCONTINUED | OUTPATIENT
Start: 2020-01-27 | End: 2020-01-29

## 2020-01-27 RX ORDER — LEVOTHYROXINE SODIUM 125 MCG
125 TABLET ORAL DAILY
Refills: 0 | Status: DISCONTINUED | OUTPATIENT
Start: 2020-01-27 | End: 2020-01-29

## 2020-01-27 RX ORDER — ALLOPURINOL 300 MG
300 TABLET ORAL DAILY
Refills: 0 | Status: DISCONTINUED | OUTPATIENT
Start: 2020-01-27 | End: 2020-01-29

## 2020-01-27 RX ORDER — ASPIRIN/CALCIUM CARB/MAGNESIUM 324 MG
81 TABLET ORAL DAILY
Refills: 0 | Status: DISCONTINUED | OUTPATIENT
Start: 2020-01-27 | End: 2020-01-29

## 2020-01-27 RX ORDER — CLOPIDOGREL BISULFATE 75 MG/1
75 TABLET, FILM COATED ORAL DAILY
Refills: 0 | Status: DISCONTINUED | OUTPATIENT
Start: 2020-01-27 | End: 2020-01-29

## 2020-01-27 RX ORDER — PANTOPRAZOLE SODIUM 20 MG/1
40 TABLET, DELAYED RELEASE ORAL
Refills: 0 | Status: DISCONTINUED | OUTPATIENT
Start: 2020-01-27 | End: 2020-01-29

## 2020-01-27 RX ADMIN — OXYCODONE HYDROCHLORIDE 15 MILLIGRAM(S): 5 TABLET ORAL at 22:22

## 2020-01-27 RX ADMIN — OXYCODONE HYDROCHLORIDE 15 MILLIGRAM(S): 5 TABLET ORAL at 21:52

## 2020-01-27 RX ADMIN — GABAPENTIN 400 MILLIGRAM(S): 400 CAPSULE ORAL at 18:23

## 2020-01-27 RX ADMIN — Medication 1000 MILLIGRAM(S): at 23:43

## 2020-01-27 RX ADMIN — Medication 2.5 MILLIGRAM(S): at 15:48

## 2020-01-27 RX ADMIN — SODIUM CHLORIDE 75 MILLILITER(S): 9 INJECTION, SOLUTION INTRAVENOUS at 18:23

## 2020-01-27 RX ADMIN — Medication 81 MILLIGRAM(S): at 15:48

## 2020-01-27 RX ADMIN — Medication 1000 MILLIGRAM(S): at 23:13

## 2020-01-27 RX ADMIN — ENOXAPARIN SODIUM 40 MILLIGRAM(S): 100 INJECTION SUBCUTANEOUS at 18:23

## 2020-01-27 RX ADMIN — CLOPIDOGREL BISULFATE 75 MILLIGRAM(S): 75 TABLET, FILM COATED ORAL at 15:48

## 2020-01-27 RX ADMIN — Medication 300 MILLIGRAM(S): at 15:48

## 2020-01-27 NOTE — H&P ADULT - NSHPLABSRESULTS_GEN_ALL_CORE
11.8   13.86 )-----------( 270                  40.2       135  |  97  |  30  ----------------------------<  98  4.7  |  24  |  1.63        TPro  x  /  Alb  x  /  TBili  0.4  /  DBili  x   /  AST  33  /  ALT  18  /  AlkPhos  92       FLU A B RSV Detection by PCR (01.26.20 @ 22:28)    Flu A Result: NotDetec: The Flu A B RSV assay is a Real-Time PCR test for the qualitative  detection and differentiation of Influenza A, Influenza B, and  Respiratory Syncytial Virus on nasopharyngeal swabs. The results should  be interpreted in the context of all clinical and laboratory findings.    Flu B Result: NotDetec    RSV Result: NotDetec    < from: Xray Chest 1 View AP/PA (01.26.20 @ 18:51) >    FINDINGS:   The lungs are clear. No pleural effusion or pneumothorax. Unchanged elevation of the right hemidiaphragm.  Heart size cannot be accurately assessed on this projection.  The visualized osseous structures are unremarkable.     IMPRESSION:  Clear lungs.      < from: CT Head No Cont (01.26.20 @ 18:38) >    FINDINGS:     No acute intracranial hemorrhage, extra-axial collection, vasogenic edema, hydrocephalus, mass effect or midline shift. No acute territorial infarct.    The visualized paranasal sinuses, mastoid air cells and middle ear cavities are clear.    The soft tissues of the scalp are unremarkable. The calvarium is intact.    IMPRESSION:     No acute intracranial hemorrhage, territorial infarct or mass effect. labs and imaging personally reviewed.                            11.8   13.86 )-----------( 270                  40.2       135  |  97  |  30  ----------------------------<  98  4.7  |  24  |  1.63        TPro  x  /  Alb  x  /  TBili  0.4  /  DBili  x   /  AST  33  /  ALT  18  /  AlkPhos  92       FLU A B RSV Detection by PCR (01.26.20 @ 22:28)    Flu A Result: NotDetec: The Flu A B RSV assay is a Real-Time PCR test for the qualitative  detection and differentiation of Influenza A, Influenza B, and  Respiratory Syncytial Virus on nasopharyngeal swabs. The results should  be interpreted in the context of all clinical and laboratory findings.    Flu B Result: NotDetec    RSV Result: NotDetec    < from: Xray Chest 1 View AP/PA (01.26.20 @ 18:51) >    FINDINGS:   The lungs are clear. No pleural effusion or pneumothorax. Unchanged elevation of the right hemidiaphragm.  Heart size cannot be accurately assessed on this projection.  The visualized osseous structures are unremarkable.     IMPRESSION:  Clear lungs.      < from: CT Head No Cont (01.26.20 @ 18:38) >    FINDINGS:     No acute intracranial hemorrhage, extra-axial collection, vasogenic edema, hydrocephalus, mass effect or midline shift. No acute territorial infarct.    The visualized paranasal sinuses, mastoid air cells and middle ear cavities are clear.    The soft tissues of the scalp are unremarkable. The calvarium is intact.    IMPRESSION:     No acute intracranial hemorrhage, territorial infarct or mass effect.

## 2020-01-27 NOTE — H&P ADULT - PROBLEM SELECTOR PLAN 2
-unclear etiology  --non-focal weakness, CTH showed no signs of acute infarct, RVP negative, CK wnl.  --possible rheumatologic etiology, will consider rheumatology consult  -continue to monitor for deterioration -unclear etiology, ? viral syndrome, ?due to BETTY, ? Rheumatologic issue, ?statin induced myopathy.  --non-focal weakness, CTH showed no signs of acute infarct, RVP negative, CK wnl.  --possible rheumatologic etiology, will consider rheumatology consult  --continue to monitor for deterioration

## 2020-01-27 NOTE — MEDICAL STUDENT ADULT H&P (EDUCATION) - NS MD HP STUD HX OF PRESENT ILLNESS FT
69F with history of HTN, HLD, CAD s/p stents, hypothyroidism, OA, kidney stones, lumbar laminectomy, cholecystectomy, hernia and b/l knee replacements presenting with headache, neck, back and shoulder pain for 4 days. 1 day ago began having chest pain and some shortness of breath so she came to the ED due to her last admission for stent placement. Brought into ED by friend where she began having some confusion which has since resolved. Her pain is sharp and non-exertional. Does not feel like the chest pain she had last admission. No fevers, weakness/numbness, cough, rhinorrhea, abdominal pain, n/v, diarrhea, dysuria, rashes or edema.

## 2020-01-27 NOTE — H&P ADULT - ASSESSMENT
69 year old F with multiple chronic problems including CAD s/p stent in Nov 2019, HTN, HLD, OA, hypothyroidism, mild gout, hx of PE in 1974 2/2 R knee surgery, hx of rheumatic fever at 17 years old, presenting with worsening myalgias and new-onset diffuse weakness. Found to have mild BETTY, leukocytosis, with no acute CTH or CXR findings. Admitted for workup of new onset weakness and BETTY.

## 2020-01-27 NOTE — MEDICAL STUDENT ADULT H&P (EDUCATION) - NS MD HP STUD ASPLAN ASSES FT
69F w/ hx of HTN, HLD, CAD s/p stents, kidney stones, OA and hypothyroidism coming in for 4 days of myalgias, headache, shoulder and neck pain and 1 day of chest pain and shortness of breath with some confusion in ED (since resolved). Non-focal exam in ED with elevated WBC and BETTY found on labs. Negative EKG, troponin, flu, rsv, CTH and CXR. On exam, pt is well appearing in NAD with some paraspinal muscle tenderness and suprapubic tenderness noted.

## 2020-01-27 NOTE — H&P ADULT - NSHPPHYSICALEXAM_GEN_ALL_CORE
T(C): 36.7 (01-27-20 @ 12:13), Max: 37.3 (01-26-20 @ 16:48)  HR: 63 (01-27-20 @ 12:13) (61 - 80)  BP: 143/89 (01-27-20 @ 12:13) (97/55 - 143/89)  RR: 18 (01-27-20 @ 12:13) (18 - 19)  SpO2: 98% (01-27-20 @ 12:13) (93% - 100%)    CONSTITUTIONAL: well appearing, well developed, no apparent distress, but anxious   HEAD: Head atraumatic, normal cephalic shape.  EYES: sclera clear bilaterally,   CARDIAC: regular rate, normal S1/S2, no murmurs  RESPIRATORY: no respiratory distress, normal breath sounds  CHEST: no erythema, warmth, tenderness or crepitus  GASTROINTESTINAL: soft, nontender, bowel sounds present, +suprapubic tenderness   MUSCULOSKELETAL: +TTP normal strength and ROM  NEUROLOGICAL: AAOx3, no focal deficits, full strength   SKIN: normal skin color, no apparent rashes  PSYCH: normal mood/affect T(C): 36.7 (01-27-20 @ 12:13), Max: 37.3 (01-26-20 @ 16:48)  HR: 63 (01-27-20 @ 12:13) (61 - 80)  BP: 143/89 (01-27-20 @ 12:13) (97/55 - 143/89)  RR: 18 (01-27-20 @ 12:13) (18 - 19)  SpO2: 98% (01-27-20 @ 12:13) (93% - 100%)    CONSTITUTIONAL: well appearing, well developed, no apparent distress, but anxious   HEAD: Head atraumatic, normal cephalic shape.  EYES: sclera clear bilaterally,   CARDIAC: regular rate, normal S1/S2, no murmurs  RESPIRATORY: no respiratory distress, normal breath sounds  CHEST: no erythema, warmth, tenderness or crepitus  GASTROINTESTINAL: soft, nontender, bowel sounds present, +suprapubic tenderness   MUSCULOSKELETAL: +TTP normal strength and ROM  NEUROLOGICAL: AAOx3, no focal deficits  SKIN: normal skin color, no apparent rashes  PSYCH: somewhat anxious T(C): 36.7 (01-27-20 @ 12:13), Max: 37.3 (01-26-20 @ 16:48)  HR: 63 (01-27-20 @ 12:13) (61 - 80)  BP: 143/89 (01-27-20 @ 12:13) (97/55 - 143/89)  RR: 18 (01-27-20 @ 12:13) (18 - 19)  SpO2: 98% (01-27-20 @ 12:13) (93% - 100%)    CONSTITUTIONAL: well appearing, well developed, no apparent distress, but anxious   HEAD: Head atraumatic, normal cephalic shape.  EYES: sclera clear bilaterally,   CARDIAC: regular rate, normal S1/S2, no murmurs  RESPIRATORY: no respiratory distress, normal breath sounds  CHEST: no erythema, warmth, tenderness or crepitus  GASTROINTESTINAL: soft, nontender, bowel sounds present, +suprapubic tenderness   MUSCULOSKELETAL: +tenderness to palpation on the upper back, shoulders and lower back. normal strength and ROM  NEUROLOGICAL: AAOx3, no focal deficits  SKIN: normal skin color, no apparent rashes  PSYCH: somewhat anxious

## 2020-01-27 NOTE — H&P ADULT - NSICDXPASTSURGICALHX_GEN_ALL_CORE_FT
PAST SURGICAL HISTORY:  Abdominal hernia     H/O right inguinal hernia repair 2014    H/O total knee replacement, bilateral 2007    History of cholecystectomy 1997    S/P lumbar laminectomy 2015

## 2020-01-27 NOTE — MEDICAL STUDENT ADULT H&P (EDUCATION) - NS MD HP STUD PE NECK FT
supple, no spinal point tenderness.  normal range of motion with some associated neck pain when moving

## 2020-01-27 NOTE — MEDICAL STUDENT ADULT H&P (EDUCATION) - NS MD HP STUD RESULTS LAB FT
WBC= 13.8  Creatinine= 1.35  BUN=27  troponin= negative  Flu A,B= negative  RSV=negative  UA pending

## 2020-01-27 NOTE — H&P ADULT - HISTORY OF PRESENT ILLNESS
69 year old F PMHx CAD s/p stent in Nov 2019, HTN, HLD, OA, hypothyroidism, mild gout, hx of PE in 1974 2/2 R knee surgery, hx of rheumatic fever at 17 years old. Presented with a 4-day history of worsening constant myalgias, especially in the upper back, neck, headache, pain spreading to the chest which prompted her to come to the ED. Pt tried taking oxycodone that she has for chronic pain, did not relieve the pain. Pain is exacerbated by lying on back or side (causes that side to be painful). Pt also endorses weakness, non-focal, for the past few days. Pt did not endorse any change in speech or unilateral weakness.     In ED, vitals were T 99.1 F, HR 78, /67, RR 18, SpO2 93% RA, increased to 96 on 3 LPM NC, given 3L NS bolus, Reglan 10 mg x1, oxy IR 5 mg x1, Tylenol 975 mg x1. CTH and CXR showed no acute pathology. Flu A/B/RSV negative, UA obtained due to brief burning/warm sensation w/urination	.

## 2020-01-27 NOTE — MEDICAL STUDENT ADULT H&P (EDUCATION) - NS MD HP STUD PE BACK FT
no rashes, no midline spinal tenderness, some paraspinal muscle tenderness worsened by sitting up and palpation

## 2020-01-27 NOTE — MEDICAL STUDENT ADULT H&P (EDUCATION) - NS MD HP STUD ASPLAN PLAN FT
Will give tylenol for pain. Start aspirin and plavix, confirm other home medications. UA, CPK and CBC/CMP ordered.

## 2020-01-27 NOTE — H&P ADULT - NSHPREVIEWOFSYSTEMS_GEN_ALL_CORE
REVIEW OF SYSTEMS:    CONSTITUTIONAL: weakness, pain, and nausea as above, no fever or chills  EYES/ENT: No visual changes;  No vertigo or throat pain   NECK: pain per HPI   RESPIRATORY: No cough, wheezing, hemoptysis; No shortness of breath  CARDIOVASCULAR: pain per HPI   GASTROINTESTINAL: +nausea per HPI. No abdominal or epigastric pain. No vomiting, or hematemesis; No diarrhea or constipation. No melena or hematochezia.  GENITOURINARY: brief episode of burning/warm sensation  NEUROLOGICAL: weakness per HPI   SKIN: No itching, rashes

## 2020-01-27 NOTE — MEDICAL STUDENT ADULT H&P (EDUCATION) - NS MD HP STUD CHIEF COMPLAINT FT
69F presenting with 4 days of headache and myalgias with progression to chest pain and shortness of breath for 1 day

## 2020-01-27 NOTE — H&P ADULT - ATTENDING COMMENTS
pt seen and examined.  above plan discussed on rounds today.  In addition,    69 F pmh CAD s/p stent in Nov 2019, HTN, HLD, OA, hypothyroidism p/w generalized muscle pain and weakness. Ddx: -unclear etiology, ? viral syndrome, ?due to BETTY, ? Rheumatologic issue, ?statin induced myopathy.  - check cpk  - check viral panel  - hold statin  - check a UA  - c/w IV fluids hydration  - BETTY is resolving with hydration.

## 2020-01-27 NOTE — H&P ADULT - PROBLEM SELECTOR PLAN 3
-unclear etiology  --no fever, UA wnl, no steroid use at home  --will continue to monitor, currently holding abx due to lack of s/sx of active infection --unclear etiology  --no fever, UA wnl, no steroid use at home  --will continue to monitor, currently holding abx due to lack of s/sx of active infection

## 2020-01-27 NOTE — H&P ADULT - PROBLEM SELECTOR PLAN 1
-Cr 1.63 on admission, s/p 3 L NS, improved to 0.97  -likely 2/2 dehydration given pt states she was not eating prior to arrival, was feeling too weak, therefore likely pre-renal  -will continue to monitor BMP daily

## 2020-01-27 NOTE — H&P ADULT - NSICDXPASTMEDICALHX_GEN_ALL_CORE_FT
PAST MEDICAL HISTORY:  COPD, mild     GERD (gastroesophageal reflux disease)     Gout     Heart murmur     HTN (hypertension)     Hyperlipidemia     Hypothyroidism     Kidney calculi     Lumbar spinal stenosis     OA (osteoarthritis)     Osteoporosis     Pulmonary embolism 1974  due to  immobilty s/p fall and right knee injury    Rheumatic fever At 17 years old    Scoliosis

## 2020-01-27 NOTE — H&P ADULT - PROBLEM SELECTOR PLAN 4
-s/p stenting in Nov 2019  -Trop wnl on admission  -chest pain ttp, not exertional, likely costochondritis  -will continue to monitor

## 2020-01-28 ENCOUNTER — TRANSCRIPTION ENCOUNTER (OUTPATIENT)
Age: 70
End: 2020-01-28

## 2020-01-28 DIAGNOSIS — I10 ESSENTIAL (PRIMARY) HYPERTENSION: ICD-10-CM

## 2020-01-28 LAB
CRP SERPL-MCNC: 17.66 MG/DL — HIGH (ref 0–0.4)
ERYTHROCYTE [SEDIMENTATION RATE] IN BLOOD: 54 MM/HR — HIGH (ref 0–20)
HCT VFR BLD CALC: 38.5 % — SIGNIFICANT CHANGE UP (ref 34.5–45)
HCV AB S/CO SERPL IA: 0.13 S/CO — SIGNIFICANT CHANGE UP (ref 0–0.99)
HCV AB SERPL-IMP: SIGNIFICANT CHANGE UP
HGB BLD-MCNC: 11.8 G/DL — SIGNIFICANT CHANGE UP (ref 11.5–15.5)
MCHC RBC-ENTMCNC: 26.8 PG — LOW (ref 27–34)
MCHC RBC-ENTMCNC: 30.6 GM/DL — LOW (ref 32–36)
MCV RBC AUTO: 87.5 FL — SIGNIFICANT CHANGE UP (ref 80–100)
NRBC # BLD: 0 /100 WBCS — SIGNIFICANT CHANGE UP (ref 0–0)
PLATELET # BLD AUTO: 235 K/UL — SIGNIFICANT CHANGE UP (ref 150–400)
RBC # BLD: 4.4 M/UL — SIGNIFICANT CHANGE UP (ref 3.8–5.2)
RBC # FLD: 14.1 % — SIGNIFICANT CHANGE UP (ref 10.3–14.5)
RHEUMATOID FACT SERPL-ACNC: 18 IU/ML — HIGH (ref 0–13)
WBC # BLD: 12.47 K/UL — HIGH (ref 3.8–10.5)
WBC # FLD AUTO: 12.47 K/UL — HIGH (ref 3.8–10.5)

## 2020-01-28 PROCEDURE — 99232 SBSQ HOSP IP/OBS MODERATE 35: CPT | Mod: GC

## 2020-01-28 RX ADMIN — OXYCODONE HYDROCHLORIDE 15 MILLIGRAM(S): 5 TABLET ORAL at 10:02

## 2020-01-28 RX ADMIN — OXYCODONE HYDROCHLORIDE 15 MILLIGRAM(S): 5 TABLET ORAL at 22:06

## 2020-01-28 RX ADMIN — Medication 81 MILLIGRAM(S): at 12:35

## 2020-01-28 RX ADMIN — PANTOPRAZOLE SODIUM 40 MILLIGRAM(S): 20 TABLET, DELAYED RELEASE ORAL at 05:51

## 2020-01-28 RX ADMIN — Medication 300 MILLIGRAM(S): at 12:35

## 2020-01-28 RX ADMIN — Medication 240 MILLIGRAM(S): at 05:50

## 2020-01-28 RX ADMIN — Medication 1000 MILLIGRAM(S): at 05:51

## 2020-01-28 RX ADMIN — OXYCODONE HYDROCHLORIDE 15 MILLIGRAM(S): 5 TABLET ORAL at 15:53

## 2020-01-28 RX ADMIN — OXYCODONE HYDROCHLORIDE 15 MILLIGRAM(S): 5 TABLET ORAL at 09:14

## 2020-01-28 RX ADMIN — OXYCODONE HYDROCHLORIDE 15 MILLIGRAM(S): 5 TABLET ORAL at 23:00

## 2020-01-28 RX ADMIN — Medication 125 MICROGRAM(S): at 05:50

## 2020-01-28 RX ADMIN — CLOPIDOGREL BISULFATE 75 MILLIGRAM(S): 75 TABLET, FILM COATED ORAL at 12:35

## 2020-01-28 RX ADMIN — OXYCODONE HYDROCHLORIDE 15 MILLIGRAM(S): 5 TABLET ORAL at 16:40

## 2020-01-28 RX ADMIN — GABAPENTIN 400 MILLIGRAM(S): 400 CAPSULE ORAL at 05:50

## 2020-01-28 RX ADMIN — GABAPENTIN 400 MILLIGRAM(S): 400 CAPSULE ORAL at 17:04

## 2020-01-28 RX ADMIN — Medication 2.5 MILLIGRAM(S): at 05:50

## 2020-01-28 RX ADMIN — ENOXAPARIN SODIUM 40 MILLIGRAM(S): 100 INJECTION SUBCUTANEOUS at 12:35

## 2020-01-28 RX ADMIN — Medication 1000 MILLIGRAM(S): at 13:07

## 2020-01-28 RX ADMIN — Medication 1000 MILLIGRAM(S): at 21:51

## 2020-01-28 NOTE — DISCHARGE NOTE PROVIDER - NSDCMRMEDTOKEN_GEN_ALL_CORE_FT
allopurinol 300 mg oral tablet: 1 tab(s) orally once a day  Aspir 81 oral delayed release tablet: 1 tab(s) orally once a day  clopidogrel 75 mg oral tablet: 1 tab(s) orally once a day MDD:One  diltiazem 240 mg/24 hours oral capsule, extended release: 1 cap(s) orally once a day  enalapril 2.5 mg oral tablet: 1 tab(s) orally once a day  furosemide 20 mg oral tablet: 1 tab(s) orally once a day  gabapentin 100 mg oral capsule: 4 cap(s) orally 2 times a day  Klor-Con M20 oral tablet, extended release:  orally once a day  levothyroxine 125 mcg (0.125 mg) oral capsule: 1 cap(s) orally once a day  omeprazole 40 mg oral delayed release capsule: 1 cap(s) orally once a day  oxyCODONE 30 mg oral tablet: 1 tab(s) orally every 4 hours while awake, As Needed  potassium citrate 15 mEq oral tablet, extended release: 1 tab(s) orally once a day  valACYclovir 1 g oral tablet: 1 tab(s) orally 2 times a day, As Needed allopurinol 300 mg oral tablet: 1 tab(s) orally once a day  Aspir 81 oral delayed release tablet: 1 tab(s) orally once a day  clopidogrel 75 mg oral tablet: 1 tab(s) orally once a day MDD:One  diltiazem 240 mg/24 hours oral capsule, extended release: 1 cap(s) orally once a day  enalapril 2.5 mg oral tablet: 1 tab(s) orally once a day  furosemide 20 mg oral tablet: 1 tab(s) orally once a day  gabapentin 100 mg oral capsule: 4 cap(s) orally 2 times a day  Klor-Con M20 oral tablet, extended release:  orally once a day  levothyroxine 125 mcg (0.125 mg) oral capsule: 1 cap(s) orally once a day  omeprazole 40 mg oral delayed release capsule: 1 cap(s) orally once a day  oxyCODONE 30 mg oral tablet: 1 tab(s) orally every 4 hours while awake, As Needed  valACYclovir 1 g oral tablet: 1 tab(s) orally 2 times a day, As Needed

## 2020-01-28 NOTE — PROGRESS NOTE ADULT - PROBLEM SELECTOR PLAN 7
Transitions of Care Status:  1.  Name of PCP: Junior  2.  PCP Contacted on Admission: [ ] Y    [ ] N    3.  PCP contacted at Discharge: [ ] Y    [ ] N    [ ] N/A  4.  Post-Discharge Appointment Date and Location:  5.  Summary of Handoff given to PCP:

## 2020-01-28 NOTE — DISCHARGE NOTE PROVIDER - NSDCFUSCHEDAPPT_GEN_ALL_CORE_FT
EZEQUIEL WRIGHT ; 02/19/2020 ; [facility] [address]  EZEQUIEL WRIGHT ; 04/15/2020 ; [facility] [address]  EZEQUIEL WRIGHT ; 02/19/2020 ; [facility] [address]  EZEQUIEL WRIGHT ; 04/15/2020 ; [facility] [address]  EZEQUIEL WRIGHT ; 02/19/2020 ; [facility] [address]  EZEQUIEL WRIGHT ; 04/15/2020 ; [facility] [address]  EZEQUIEL WRIGHT ; 02/19/2020 ; [facility] [address]  EZEQUIEL WRIGHT ; 04/15/2020 ; [facility] [address]  EZEQUIEL WRIGHT ; 02/19/2020 ; [facility] [address]  EZEQUIEL WRIGHT ; 04/15/2020 ; [facility] [address]  EZEQUIEL WRIGHT ; 02/19/2020 ; [facility] [address]  EZEQUIEL WRIGHT ; 04/15/2020 ; [facility] [address]  EZEQUIEL WRIGHT ; 02/05/2020 ; [facility] [address] EZEQUIEL [place] ; 02/19/2020 ; [facility] [address] EZEQUIEL [place] ; 04/15/2020 ; [facility]

## 2020-01-28 NOTE — DISCHARGE NOTE PROVIDER - HOSPITAL COURSE
69 year old F PMHx CAD s/p stent in Nov 2019, HTN, HLD, OA, hypothyroidism, mild gout, hx of PE in 1974 2/2 R knee surgery, hx of rheumatic fever at 17 years old. Presented with a 4-day history of worsening constant myalgias, especially in the upper back, neck, headache, pain spreading to the chest which prompted her to come to the ED. Pt tried taking oxycodone that she has for chronic pain, did not relieve the pain. Pain is exacerbated by lying on back or side (causes that side to be painful). Pt also endorses weakness, non-focal, for the past few days. Pt did not endorse any change in speech or unilateral weakness.         In ED, vitals were T 99.1 F, HR 78, /67, RR 18, SpO2 93% RA, increased to 96 on 3 LPM NC, given 3L NS bolus, Reglan 10 mg x1, oxy IR 5 mg x1, Tylenol 975 mg x1. CTH and CXR showed no acute pathology. Flu A/B/RSV negative, UA obtained due to brief burning/warm sensation w/urination	.         69 year old F with multiple chronic problems including CAD s/p stent in Nov 2019, HTN, HLD, OA, hypothyroidism, mild gout, hx of PE in 1974 2/2 R knee surgery, hx of rheumatic fever at 17 years old, presenting with worsening myalgias and new-onset diffuse weakness. Found to have mild BETTY, leukocytosis, with no acute CTH or CXR findings. Admitted for workup of new onset weakness and BETTY.  UA normal, CPK normal, RVP negative. Symptoms resolved with IV fluids and tylenol. Likely viral syndrome, however consider further rheumatologic workup if pain/weakness persists due to elevated CRP and RF.          Problem/Plan - 1:    ·  Problem: Acute kidney injury.  Resolved with IV fluids         Problem/Plan - 2:    ·  Problem: Generalized weakness.  Improved with fluids and tylenol.    Possibly due to viral etiology vs rheumatologic. Consider outpatient rheumatology labs if pain persists.             Problem/Plan - 3:    ·  Problem: Leukocytosis. WBC count downtrending. Likely viral etiology.             Problem/Plan - 4:    ·  Problem: CAD (coronary artery disease).  Continue aspirin and plavix 69 year old F PMHx CAD s/p stent in Nov 2019, HTN, HLD, OA, hypothyroidism, mild gout, hx of PE in 1974 2/2 R knee surgery, hx of rheumatic fever at 17 years old. Presented with a 4-day history of worsening constant myalgias, especially in the upper back, neck, headache, pain spreading to the chest which prompted her to come to the ED. Pt tried taking oxycodone that she has for chronic pain, did not relieve the pain. Pain is exacerbated by lying on back or side (causes that side to be painful). Pt also endorses weakness, non-focal, for the past few days. Pt did not endorse any change in speech or unilateral weakness.         In ED, vitals were T 99.1 F, HR 78, /67, RR 18, SpO2 93% RA, increased to 96 on 3 LPM NC, given 3L NS bolus, Reglan 10 mg x1, oxy IR 5 mg x1, Tylenol 975 mg x1. CTH and CXR showed no acute pathology. Flu A/B/RSV negative, UA obtained due to brief burning/warm sensation w/urination	.         Admitted for workup of new onset weakness and BETTY.  UA normal, CPK normal, RVP negative. Symptoms resolved with IV fluids and tylenol. Likely viral syndrome, however consider further rheumatologic workup if pain/weakness persists due to elevated CRP, ESR and RF. Acute kidney injury likely secondary to dehydration, resolved with IV fluids. Generalized weakness and pain improved with fluids and tylenol. Possibly due to viral etiology vs rheumatologic. Consider outpatient rheumatology labs if pain persists. Leukocytosis downtrending, Afebrile, no cultures drawn. Likely viral etiology.    CAD (coronary artery disease). Currently holding statin due to possibility of statin induced myopathy. Continue aspirin and plavix        Patient was evaluated by PT. PT recommended [facility],  however patient would like to go home and have home PT. Pt medically stable for discharge.

## 2020-01-28 NOTE — PROGRESS NOTE ADULT - PROBLEM SELECTOR PLAN 2
-unclear etiology, ? viral syndrome, ?due to BETTY, ? Rheumatologic issue, ?statin induced myopathy.  --non-focal weakness, CTH showed no signs of acute infarct, RVP negative, CK wnl.  --possible rheumatologic etiology, will consider rheumatology consult  --continue to monitor for deterioration -improved, will follow up rheumatology labs.  --non-focal weakness, CTH showed no signs of acute infarct, RVP negative, CK wnl.  --possible rheumatologic etiology, will consider rheumatology consult  --continue to monitor for deterioration -improved, will follow up rheumatology labs.  --non-focal weakness, CTH showed no signs of acute infarct, RVP negative, CK wnl.  --possible rheumatologic etiology, ?polymyalgia Rheumatica. Symptoms are improving without any steroids at this time so will hold off on Rheumatology consult but she can definitely follow up outpatient.   --continue to monitor for deterioration

## 2020-01-28 NOTE — PROGRESS NOTE ADULT - SUBJECTIVE AND OBJECTIVE BOX
[facility]  Sam Ambriz- MS4    PROGRESS NOTE:     CONTACT INFO:  ALAYNA Page MD  Internal Medicine PGY3  Pager: [phone] [place]/ [id number] [facility]    Patient is a 69y old  Female who presents with a chief complaint of Worsening pain, weakness, BETTY (2020 14:26)      SUBJECTIVE / OVERNIGHT EVENTS:  No acute events overnight. Patient seen and evaluated at bedside. No fever/chills.  Denies SOB at rest, chest pain, palpitations, abdominal pain, nausea/vomiting    ADDITIONAL REVIEW OF SYSTEMS:     MEDICATIONS  (STANDING):  acetaminophen   Tablet .. 1000 milliGRAM(s) Oral every 8 hours  allopurinol 300 milliGRAM(s) Oral daily  aspirin enteric coated 81 milliGRAM(s) Oral daily  clopidogrel Tablet 75 milliGRAM(s) Oral daily  diltiazem    milliGRAM(s) Oral daily  enalapril 2.5 milliGRAM(s) Oral daily  enoxaparin Injectable 40 milliGRAM(s) SubCutaneous daily  gabapentin 400 milliGRAM(s) Oral two times a day  lactated ringers. 1000 milliLiter(s) (75 mL/Hr) IV Continuous <Continuous>  levothyroxine 125 MICROGram(s) Oral daily  pantoprazole    Tablet 40 milliGRAM(s) Oral before breakfast    MEDICATIONS  (PRN):  oxyCODONE    IR 15 milliGRAM(s) Oral every 4 hours PRN Severe Pain (7 - 10)      CAPILLARY BLOOD GLUCOSE        I&O's Summary    2020 07:01  -  2020 07:00  --------------------------------------------------------  IN: 360 mL / OUT: 2400 mL / NET: -2040 mL        PHYSICAL EXAM:  Vital Signs Last 24 Hrs  T(C): 36.4 (2020 04:28), Max: 36.8 (2020 22:08)  T(F): 97.6 (2020 04:28), Max: 98.2 (2020 22:08)  HR: 61 (2020 04:28) (61 - 81)  BP: 148/78 (2020 04:28) (143/89 - 150/72)  BP(mean): --  RR: 18 (2020 04:28) (18 - 18)  SpO2: 96% (2020 04:28) (96% - 98%)    	CONSTITUTIONAL: well appearing, well developed, no apparent distress, but anxious   	HEAD: Head atraumatic, normal cephalic shape.  	EYES: sclera clear bilaterally,   	CARDIAC: regular rate, normal S1/S2, no murmurs  	RESPIRATORY: no respiratory distress, normal breath sounds  	CHEST: no erythema, warmth, tenderness or crepitus  	GASTROINTESTINAL: soft, nontender, bowel sounds present, +suprapubic tenderness   	MUSCULOSKELETAL: +tenderness to palpation on the upper back, shoulders and lower back. normal strength and ROM  	NEUROLOGICAL: AAOx3, no focal deficits  	SKIN: normal skin color, no apparent rashes  PSYCH: somewhat anxious    LABS:                        11.8   12.47 )-----------( 235      ( 2020 06:48 )             38.5     01-27    141  |  104  |  18  ----------------------------<  90  4.0   |  24  |  0.97    Ca    9.0      2020 11:26    TPro  6.9  /  Alb  3.4  /  TBili  0.6  /  DBili  x   /  AST  28  /  ALT  17  /  AlkPhos  92  -    PT/INR - ( 2020 18:08 )   PT: 11.8 sec;   INR: 1.03 ratio         PTT - ( 2020 18:08 )  PTT:31.9 sec  CARDIAC MARKERS ( 2020 11:26 )  x     / x     / 58 U/L / x     / x          Urinalysis Basic - ( 2020 21:11 )    Color: Light Yellow / Appearance: Clear / S.012 / pH: x  Gluc: x / Ketone: Trace  / Bili: Negative / Urobili: Negative   Blood: x / Protein: Trace / Nitrite: Negative   Leuk Esterase: Negative / RBC: 1 /hpf / WBC 0 /HPF   Sq Epi: x / Non Sq Epi: 0 /hpf / Bacteria: Negative          RADIOLOGY & ADDITIONAL TESTS:  Results Reviewed:   Imaging Personally Reviewed:  Electrocardiogram Personally Reviewed:    COORDINATION OF CARE:  Care Discussed with Consultants/Other Providers [Y/N]:  Prior or Outpatient Records Reviewed [Y/N]: [facility]  Sam Ambriz- MS4    PROGRESS NOTE:     CONTACT INFO:  ALAYNA Page MD  Internal Medicine PGY3  Pager: [phone] [place]/ [id number] [facility]    Patient is a 69y old  Female who presents with a chief complaint of Worsening pain, weakness, BETTY (2020 14:26)      SUBJECTIVE / OVERNIGHT EVENTS:  No acute events overnight. Patient seen and evaluated at bedside. No fever/chills.  Denies SOB at rest, chest pain, palpitations, abdominal pain, nausea/vomiting  Reports that her pain has improved an is now limited to the right shoulder. No dysuria, urinary frequency or urgency     ADDITIONAL REVIEW OF SYSTEMS: Decreased pain in neck and shoulders. Limited to some pain in R shoulder    MEDICATIONS  (STANDING):  acetaminophen   Tablet .. 1000 milliGRAM(s) Oral every 8 hours  allopurinol 300 milliGRAM(s) Oral daily  aspirin enteric coated 81 milliGRAM(s) Oral daily  clopidogrel Tablet 75 milliGRAM(s) Oral daily  diltiazem    milliGRAM(s) Oral daily  enalapril 2.5 milliGRAM(s) Oral daily  enoxaparin Injectable 40 milliGRAM(s) SubCutaneous daily  gabapentin 400 milliGRAM(s) Oral two times a day  lactated ringers. 1000 milliLiter(s) (75 mL/Hr) IV Continuous <Continuous>  levothyroxine 125 MICROGram(s) Oral daily  pantoprazole    Tablet 40 milliGRAM(s) Oral before breakfast    MEDICATIONS  (PRN):  oxyCODONE    IR 15 milliGRAM(s) Oral every 4 hours PRN Severe Pain (7 - 10)      CAPILLARY BLOOD GLUCOSE        I&O's Summary    2020 07:01  -  2020 07:00  --------------------------------------------------------  IN: 360 mL / OUT: 2400 mL / NET: -2040 mL        PHYSICAL EXAM:  Vital Signs Last 24 Hrs  T(C): 36.4 (2020 04:28), Max: 36.8 (2020 22:08)  T(F): 97.6 (2020 04:28), Max: 98.2 (2020 22:08)  HR: 61 (2020 04:28) (61 - 81)  BP: 148/78 (2020 04:28) (143/89 - 150/72)  BP(mean): --  RR: 18 (2020 04:28) (18 - 18)  SpO2: 96% (2020 04:28) (96% - 98%)    	CONSTITUTIONAL: well appearing, well developed, no apparent distress, but anxious   	HEAD: Head atraumatic, normal cephalic shape.  	EYES: sclera clear bilaterally,   	CARDIAC: regular rate, normal S1/S2, no murmurs  	RESPIRATORY: no respiratory distress, normal breath sounds  	CHEST: no erythema, warmth, tenderness or crepitus  	GASTROINTESTINAL: soft, nontender, bowel sounds present  	MUSCULOSKELETAL: +tenderness to palpation on the Right upper back and shoulder. normal strength and ROM  	NEUROLOGICAL: AAOx3, no focal deficits  	SKIN: normal skin color, no apparent rashes  PSYCH: somewhat anxious    LABS:                        11.8   12.47 )-----------( 235      ( 2020 06:48 )             38.5     -    141  |  104  |  18  ----------------------------<  90  4.0   |  24  |  0.97    Ca    9.0      2020 11:26    TPro  6.9  /  Alb  3.4  /  TBili  0.6  /  DBili  x   /  AST  28  /  ALT  17  /  AlkPhos  92  -    PT/INR - ( 2020 18:08 )   PT: 11.8 sec;   INR: 1.03 ratio         PTT - ( 2020 18:08 )  PTT:31.9 sec  CARDIAC MARKERS ( 2020 11:26 )  x     / x     / 58 U/L / x     / x          Urinalysis Basic - ( 2020 21:11 )    Color: Light Yellow / Appearance: Clear / S.012 / pH: x  Gluc: x / Ketone: Trace  / Bili: Negative / Urobili: Negative   Blood: x / Protein: Trace / Nitrite: Negative   Leuk Esterase: Negative / RBC: 1 /hpf / WBC 0 /HPF   Sq Epi: x / Non Sq Epi: 0 /hpf / Bacteria: Negative          RADIOLOGY & ADDITIONAL TESTS:  Results Reviewed:   Imaging Personally Reviewed:  Electrocardiogram Personally Reviewed:    COORDINATION OF CARE:  Care Discussed with Consultants/Other Providers [Y/N]:  Prior or Outpatient Records Reviewed [Y/N]: [facility]  Sam Ambriz- MS4    PROGRESS NOTE:     Patient is a 69y old  Female who presents with a chief complaint of Worsening pain, weakness, BETTY (2020 14:26)      SUBJECTIVE / OVERNIGHT EVENTS:  No acute events overnight. Patient seen and evaluated at bedside. No fever/chills.  Denies SOB at rest, chest pain, palpitations, abdominal pain, nausea/vomiting  Reports that her pain has improved an is now limited to the right shoulder. No dysuria, urinary frequency or urgency     ADDITIONAL REVIEW OF SYSTEMS: Decreased pain in neck and shoulders. Limited to some pain in R shoulder    MEDICATIONS  (STANDING):  acetaminophen   Tablet .. 1000 milliGRAM(s) Oral every 8 hours  allopurinol 300 milliGRAM(s) Oral daily  aspirin enteric coated 81 milliGRAM(s) Oral daily  clopidogrel Tablet 75 milliGRAM(s) Oral daily  diltiazem    milliGRAM(s) Oral daily  enalapril 2.5 milliGRAM(s) Oral daily  enoxaparin Injectable 40 milliGRAM(s) SubCutaneous daily  gabapentin 400 milliGRAM(s) Oral two times a day  lactated ringers. 1000 milliLiter(s) (75 mL/Hr) IV Continuous <Continuous>  levothyroxine 125 MICROGram(s) Oral daily  pantoprazole    Tablet 40 milliGRAM(s) Oral before breakfast    MEDICATIONS  (PRN):  oxyCODONE    IR 15 milliGRAM(s) Oral every 4 hours PRN Severe Pain (7 - 10)      CAPILLARY BLOOD GLUCOSE        I&O's Summary    2020 07:01  -  2020 07:00  --------------------------------------------------------  IN: 360 mL / OUT: 2400 mL / NET: -2040 mL        PHYSICAL EXAM:  Vital Signs Last 24 Hrs  T(C): 36.4 (2020 04:28), Max: 36.8 (2020 22:08)  T(F): 97.6 (2020 04:28), Max: 98.2 (2020 22:08)  HR: 61 (2020 04:28) (61 - 81)  BP: 148/78 (2020 04:28) (143/89 - 150/72)  BP(mean): --  RR: 18 (2020 04:28) (18 - 18)  SpO2: 96% (2020 04:28) (96% - 98%)    	CONSTITUTIONAL: well appearing, well developed, no apparent distress, but anxious   	HEAD: Head atraumatic, normal cephalic shape.  	EYES: sclera clear bilaterally,   	CARDIAC: regular rate, normal S1/S2, no murmurs  	RESPIRATORY: no respiratory distress, normal breath sounds  	CHEST: no erythema, warmth, tenderness or crepitus  	GASTROINTESTINAL: soft, nontender, bowel sounds present  	MUSCULOSKELETAL: +tenderness to palpation on the Right upper back and shoulder. normal strength and ROM  	NEUROLOGICAL: AAOx3, no focal deficits  	SKIN: normal skin color, no apparent rashes  PSYCH: somewhat anxious    LABS:                        11.8   12.47 )-----------( 235      ( 2020 06:48 )             38.5     01-27    141  |  104  |  18  ----------------------------<  90  4.0   |  24  |  0.97    Ca    9.0      2020 11:26    TPro  6.9  /  Alb  3.4  /  TBili  0.6  /  DBili  x   /  AST  28  /  ALT  17  /  AlkPhos  92      PT/INR - ( 2020 18:08 )   PT: 11.8 sec;   INR: 1.03 ratio         PTT - ( 2020 18:08 )  PTT:31.9 sec  CARDIAC MARKERS ( 2020 11:26 )  x     / x     / 58 U/L / x     / x          Urinalysis Basic - ( 2020 21:11 )    Color: Light Yellow / Appearance: Clear / S.012 / pH: x  Gluc: x / Ketone: Trace  / Bili: Negative / Urobili: Negative   Blood: x / Protein: Trace / Nitrite: Negative   Leuk Esterase: Negative / RBC: 1 /hpf / WBC 0 /HPF   Sq Epi: x / Non Sq Epi: 0 /hpf / Bacteria: Negative          RADIOLOGY & ADDITIONAL TESTS:  Results Reviewed:   Imaging Personally Reviewed:  Electrocardiogram Personally Reviewed:    COORDINATION OF CARE:  Care Discussed with Consultants/Other Providers [Y/N]:  Prior or Outpatient Records Reviewed [Y/N]:

## 2020-01-28 NOTE — PROGRESS NOTE ADULT - PROBLEM SELECTOR PLAN 3
--unclear etiology  --no fever, UA wnl, no steroid use at home  --will continue to monitor, currently holding abx due to lack of s/sx of active infection --unclear etiology, downtrending  --no fever, UA wnl, no steroid use at home  --will continue to monitor, currently holding abx due to lack of s/sx of active infection --unclear etiology. Ddx: unidentified viral illness, reactive leukocytosis (elevated ESR and CRP),   -- leukocytosis is downtrending  --no fever, UA wnl, no steroid use at home  --will continue to monitor, currently holding abx due to lack of s/sx of active infection

## 2020-01-28 NOTE — DISCHARGE NOTE PROVIDER - CARE PROVIDER_API CALL
Primo Strange)  Medicine  [address], [place] [address]  Phone: [phone]  Fax: [phone]  Follow Up Time: 1 week

## 2020-01-28 NOTE — PHYSICAL THERAPY INITIAL EVALUATION ADULT - TRANSFER SKILLS, REHAB EVAL
needs device/0 AN indoors, std cane outdoors at times/independent 0 AD indoors, std cane outdoors at times/needs device/independent

## 2020-01-28 NOTE — PHYSICAL THERAPY INITIAL EVALUATION ADULT - PERTINENT HX OF CURRENT PROBLEM, REHAB EVAL
Pt is a 69 year old F PMHx CAD s/p stent in Nov 2019, HTN, HLD, OA, hypothyroidism, mild gout, hx of PE in 1974 2/2 R knee surgery, hx of rheumatic fever at 17 years old. Presented with a 4-day history of worsening constant myalgias, especially in the upper back, neck, headache, pain spreading to the chest which prompted her to come to the ED.  Continued...

## 2020-01-28 NOTE — PHYSICAL THERAPY INITIAL EVALUATION ADULT - ADDITIONAL COMMENTS
Pt reports she was extremely weak PTA and ambulated with std can arlen either side of her. Pt reports she was extremely weak PTA and ambulated with 2 std canes [on either side of her]

## 2020-01-28 NOTE — PROGRESS NOTE ADULT - PROBLEM SELECTOR PLAN 4
-cont home meds -cont home meds  -diltiazem    milliGRAM(s) Oral daily  -enalapril 2.5 milliGRAM(s) Oral daily

## 2020-01-28 NOTE — PHYSICAL THERAPY INITIAL EVALUATION ADULT - PRECAUTIONS/LIMITATIONS, REHAB EVAL
pertinent history continued... pertinent history continued...Pt tried taking oxycodone that she has for chronic pain, did not relieve the pain. Pain is exacerbated by lying on back or side (causes that side to be painful). Pt also endorses weakness, non-focal, for the past few days. Pt did not endorse any change in speech or unilateral weakness. pertinent history continued...Pt tried taking oxycodone that she has for chronic pain, did not relieve the pain. Pain is exacerbated by lying on back or side (causes that side to be painful). Pt also endorses weakness, non-focal, for the past few days. Pt did not endorse any change in speech or unilateral weakness.  In ED, vitals were T 99.1 F, HR 78, /67, RR 18, SpO2 93% RA, increased to 96 on 3 LPM NC, given 3L NS bolus, Reglan 10 mg x1, oxy IR 5 mg x1, Tylenol 975 mg x1. CTH and CXR showed no acute pathology. Flu A/B/RSV negative, UA obtained due to brief burning/warm sensation w/urination. pertinent history continued...Pt tried taking oxycodone that she has for chronic pain, did not relieve the pain. Pain is exacerbated by lying on back or side (causes that side to be painful). Pt also endorses weakness, non-focal, for the past few days. Pt did not endorse any change in speech or unilateral weakness.  In ED, vitals were T 99.1 F, HR 78, /67, RR 18, SpO2 93% RA, increased to 96 on 3 LPM NC, given 3L NS bolus, Reglan 10 mg x1, oxy IR 5 mg x1, Tylenol 975 mg x1. CTH and CXR showed no acute pathology. Flu A/B/RSV negative, UA obtained due to brief burning/warm sensation w/urination.  Found to have mild BETTY, leukocytosis, with no acute CTH or CXR findings. Admitted for workup of new onset weakness and BETTY.  WBC count downtrending, creatinine normalized, normal UA. Overall feels much better. Awaiting rheumatology workup for myalgias. fall precautions/pertinent history continued... Pt tried taking oxycodone that she has for chronic pain, did not relieve the pain. Pain is exacerbated by lying on back or side (causes that side to be painful). Pt also endorses weakness, non-focal, for the past few days. Pt did not endorse any change in speech or unilateral weakness.  In ED, vitals were T 99.1 F, HR 78, /67, RR 18, SpO2 93% RA, increased to 96 on 3 LPM NC, given 3L NS bolus, Reglan 10 mg x1, oxy IR 5 mg x1, Tylenol 975 mg x1. CTH and CXR showed no acute pathology. Flu A/B/RSV negative, UA obtained due to brief burning/warm sensation w/urination.  Found to have mild BETTY, leukocytosis, with no acute CTH or CXR findings. Admitted for workup of new onset weakness and BETTY.  WBC count downtrending, creatinine normalized, normal UA. Overall feels much better. Awaiting rheumatology workup for myalgias.

## 2020-01-28 NOTE — PROGRESS NOTE ADULT - PROBLEM SELECTOR PLAN 5
-s/p stenting in Nov 2019  -Trop wnl on admission  -chest pain ttp, not exertional, likely costochondritis  -will continue to monitor -s/p stenting in Nov 2019  -Trop wnl on admission  -chest pain ttp, not exertional, likely costochondritis  -aspirin and plavix   -will continue to monitor

## 2020-01-28 NOTE — PHYSICAL THERAPY INITIAL EVALUATION ADULT - IMPAIRMENTS CONTRIBUTING TO GAIT DEVIATIONS, PT EVAL
narrow base of support/impaired balance/Pt ambulated with mildly unsteady gait, c/o LE weakness while ambulating./decreased strength

## 2020-01-28 NOTE — PROGRESS NOTE ADULT - ASSESSMENT
69 year old F with multiple chronic problems including CAD s/p stent in Nov 2019, HTN, HLD, OA, hypothyroidism, mild gout, hx of PE in 1974 2/2 R knee surgery, hx of rheumatic fever at 17 years old, presenting with worsening myalgias and new-onset diffuse weakness. Found to have mild BETTY, leukocytosis, with no acute CTH or CXR findings. Admitted for workup of new onset weakness and BETTY. 69 year old F with multiple chronic problems including CAD s/p stent in Nov 2019, HTN, HLD, OA, hypothyroidism, mild gout, hx of PE in 1974 2/2 R knee surgery, hx of rheumatic fever at 17 years old, presenting with worsening myalgias and new-onset diffuse weakness. Found to have mild BETTY, leukocytosis, with no acute CTH or CXR findings. Admitted for workup of new onset weakness and BETTY.  WBC count downtrending, creatinine normalized, normal UA. Overall feels much better. Awaiting rheumatology workup for myalgias.

## 2020-01-28 NOTE — DISCHARGE NOTE PROVIDER - NSDCCPCAREPLAN_GEN_ALL_CORE_FT
PRINCIPAL DISCHARGE DIAGNOSIS  Diagnosis: Generalized weakness  Assessment and Plan of Treatment:       SECONDARY DISCHARGE DIAGNOSES  Diagnosis: Dehydration  Assessment and Plan of Treatment:     Diagnosis: Acute kidney injury  Assessment and Plan of Treatment: Acute kidney injury PRINCIPAL DISCHARGE DIAGNOSIS  Diagnosis: Generalized weakness  Assessment and Plan of Treatment: You came into the hospital and were evaluated for weakness and pain. You were evaluated for viral illnesses which were negative. Tests were performed to check for a process involving your muscles which was also negative. Of note, you were found to have elevated autoimmune factors which is another possible cause of your symptoms. Your primary care provider can reevaluate you and decide whether you should also follow with a rheumatologist if your symptoms persist.      SECONDARY DISCHARGE DIAGNOSES  Diagnosis: Dehydration  Assessment and Plan of Treatment: Upon admission, you were found to have elevated labs assocaited with your kidney, likely due to dehydration. After IV fluids, this has resolved and your kidney function is now normal. Continue to hydrate.    Diagnosis: Acute kidney injury  Assessment and Plan of Treatment: Acute kidney injury was due to dehydration and has since resolved.

## 2020-01-28 NOTE — DISCHARGE NOTE PROVIDER - NSFOLLOWUPCLINICS_GEN_ALL_ED_FT
[facility]  Rheumatology  [address] [address]  Phone: [phone]  Fax:   Follow Up Time:

## 2020-01-29 ENCOUNTER — TRANSCRIPTION ENCOUNTER (OUTPATIENT)
Age: 70
End: 2020-01-29

## 2020-01-29 VITALS — WEIGHT: 162.04 LBS

## 2020-01-29 PROCEDURE — 70450 CT HEAD/BRAIN W/O DYE: CPT

## 2020-01-29 PROCEDURE — 81001 URINALYSIS AUTO W/SCOPE: CPT

## 2020-01-29 PROCEDURE — 85652 RBC SED RATE AUTOMATED: CPT

## 2020-01-29 PROCEDURE — 80053 COMPREHEN METABOLIC PANEL: CPT

## 2020-01-29 PROCEDURE — 85610 PROTHROMBIN TIME: CPT

## 2020-01-29 PROCEDURE — 86140 C-REACTIVE PROTEIN: CPT

## 2020-01-29 PROCEDURE — 86431 RHEUMATOID FACTOR QUANT: CPT

## 2020-01-29 PROCEDURE — 81003 URINALYSIS AUTO W/O SCOPE: CPT

## 2020-01-29 PROCEDURE — 86803 HEPATITIS C AB TEST: CPT

## 2020-01-29 PROCEDURE — 87631 RESP VIRUS 3-5 TARGETS: CPT

## 2020-01-29 PROCEDURE — 87581 M.PNEUMON DNA AMP PROBE: CPT

## 2020-01-29 PROCEDURE — 97162 PT EVAL MOD COMPLEX 30 MIN: CPT

## 2020-01-29 PROCEDURE — 86038 ANTINUCLEAR ANTIBODIES: CPT

## 2020-01-29 PROCEDURE — 71045 X-RAY EXAM CHEST 1 VIEW: CPT

## 2020-01-29 PROCEDURE — 93005 ELECTROCARDIOGRAM TRACING: CPT

## 2020-01-29 PROCEDURE — 85027 COMPLETE CBC AUTOMATED: CPT

## 2020-01-29 PROCEDURE — 84484 ASSAY OF TROPONIN QUANT: CPT

## 2020-01-29 PROCEDURE — 99285 EMERGENCY DEPT VISIT HI MDM: CPT | Mod: 25

## 2020-01-29 PROCEDURE — 87633 RESP VIRUS 12-25 TARGETS: CPT

## 2020-01-29 PROCEDURE — G0378: CPT

## 2020-01-29 PROCEDURE — 96374 THER/PROPH/DIAG INJ IV PUSH: CPT

## 2020-01-29 PROCEDURE — 82550 ASSAY OF CK (CPK): CPT

## 2020-01-29 PROCEDURE — 99239 HOSP IP/OBS DSCHRG MGMT >30: CPT | Mod: GC

## 2020-01-29 PROCEDURE — 87486 CHLMYD PNEUM DNA AMP PROBE: CPT

## 2020-01-29 PROCEDURE — 85730 THROMBOPLASTIN TIME PARTIAL: CPT

## 2020-01-29 PROCEDURE — 87798 DETECT AGENT NOS DNA AMP: CPT

## 2020-01-29 RX ORDER — POTASSIUM CITRATE MONOHYDRATE 100 %
1 POWDER (GRAM) MISCELLANEOUS
Qty: 0 | Refills: 0 | DISCHARGE

## 2020-01-29 RX ADMIN — Medication 2.5 MILLIGRAM(S): at 06:29

## 2020-01-29 RX ADMIN — GABAPENTIN 400 MILLIGRAM(S): 400 CAPSULE ORAL at 06:29

## 2020-01-29 RX ADMIN — Medication 81 MILLIGRAM(S): at 11:54

## 2020-01-29 RX ADMIN — OXYCODONE HYDROCHLORIDE 15 MILLIGRAM(S): 5 TABLET ORAL at 10:10

## 2020-01-29 RX ADMIN — Medication 300 MILLIGRAM(S): at 11:54

## 2020-01-29 RX ADMIN — Medication 125 MICROGRAM(S): at 06:29

## 2020-01-29 RX ADMIN — Medication 1000 MILLIGRAM(S): at 06:29

## 2020-01-29 RX ADMIN — PANTOPRAZOLE SODIUM 40 MILLIGRAM(S): 20 TABLET, DELAYED RELEASE ORAL at 06:29

## 2020-01-29 RX ADMIN — ENOXAPARIN SODIUM 40 MILLIGRAM(S): 100 INJECTION SUBCUTANEOUS at 11:55

## 2020-01-29 RX ADMIN — CLOPIDOGREL BISULFATE 75 MILLIGRAM(S): 75 TABLET, FILM COATED ORAL at 11:54

## 2020-01-29 RX ADMIN — OXYCODONE HYDROCHLORIDE 15 MILLIGRAM(S): 5 TABLET ORAL at 09:50

## 2020-01-29 NOTE — PROGRESS NOTE ADULT - SUBJECTIVE AND OBJECTIVE BOX
[facility] Division of Internal Medicine  Reillygerman Neel, PGY-1  Pager: [facility]: [phone] | [facility]: [phone]    Patient is a 69y old  Female who presents with a chief complaint of Worsening pain, weakness, BETTY (2020 12:42)      SUBJECTIVE / OVERNIGHT EVENTS: No acute events overnight   ADDITIONAL REVIEW OF SYSTEMS:    MEDICATIONS  (STANDING):  acetaminophen   Tablet .. 1000 milliGRAM(s) Oral every 8 hours  allopurinol 300 milliGRAM(s) Oral daily  aspirin enteric coated 81 milliGRAM(s) Oral daily  clopidogrel Tablet 75 milliGRAM(s) Oral daily  diltiazem    milliGRAM(s) Oral daily  enalapril 2.5 milliGRAM(s) Oral daily  enoxaparin Injectable 40 milliGRAM(s) SubCutaneous daily  gabapentin 400 milliGRAM(s) Oral two times a day  lactated ringers. 1000 milliLiter(s) (75 mL/Hr) IV Continuous <Continuous>  levothyroxine 125 MICROGram(s) Oral daily  pantoprazole    Tablet 40 milliGRAM(s) Oral before breakfast    MEDICATIONS  (PRN):  oxyCODONE    IR 15 milliGRAM(s) Oral every 4 hours PRN Severe Pain (7 - 10)      CAPILLARY BLOOD GLUCOSE        I&O's Summary    2020 07:01  -  2020 07:00  --------------------------------------------------------  IN: 1040 mL / OUT: 1100 mL / NET: -60 mL        PHYSICAL EXAM:  Vital Signs Last 24 Hrs  T(C): 36.7 (2020 04:28), Max: 36.7 (2020 04:28)  T(F): 98.1 (2020 04:28), Max: 98.1 (2020 04:28)  HR: 54 (2020 06:27) (54 - 87)  BP: 110/70 (2020 06:27) (92/53 - 133/84)  BP(mean): --  RR: 18 (2020 04:28) (18 - 18)  SpO2: 94% (2020 04:28) (94% - 97%)  CONSTITUTIONAL: NAD, well-developed, well-groomed  EYES: PERRLA; conjunctiva and sclera clear  ENMT: Moist oral mucosa, no pharyngeal injection or exudates; normal dentition  NECK: Supple, no palpable masses; no thyromegaly  RESPIRATORY: Normal respiratory effort; lungs are clear to auscultation bilaterally  CARDIOVASCULAR: Regular rate and rhythm, normal S1 and S2, no murmur/rub/gallop; No lower extremity edema; Peripheral pulses are 2+ bilaterally  ABDOMEN: Nontender to palpation, normoactive bowel sounds, no rebound/guarding; No hepatosplenomegaly  MUSCULOSKELETAL:  Normal gait; no clubbing or cyanosis of digits; no joint swelling or tenderness to palpation  PSYCH: A+O to person, place, and time; affect appropriate  NEUROLOGY: CN 2-12 are intact and symmetric; no gross sensory deficits   SKIN: No rashes; no palpable lesions    LABS:                        11.8   12.47 )-----------( 235      ( 2020 06:48 )             38.5         141  |  104  |  18  ----------------------------<  90  4.0   |  24  |  0.97    Ca    9.0      2020 11:26    TPro  6.9  /  Alb  3.4  /  TBili  0.6  /  DBili  x   /  AST  28  /  ALT  17  /  AlkPhos  92        CARDIAC MARKERS ( 2020 11:26 )  x     / x     / 58 U/L / x     / x          Urinalysis Basic - ( 2020 21:11 )    Color: Light Yellow / Appearance: Clear / S.012 / pH: x  Gluc: x / Ketone: Trace  / Bili: Negative / Urobili: Negative   Blood: x / Protein: Trace / Nitrite: Negative   Leuk Esterase: Negative / RBC: 1 /hpf / WBC 0 /HPF   Sq Epi: x / Non Sq Epi: 0 /hpf / Bacteria: Negative          RADIOLOGY & ADDITIONAL TESTS:  Results Reviewed:   Imaging Personally Reviewed:  Electrocardiogram Personally Reviewed:    COORDINATION OF CARE:  Care Discussed with Consultants/Other Providers [Y/N]:  Prior or Outpatient Records Reviewed [Y/N]: [facility] Division of Internal Medicine  Reillygerman Neel, PGY-1  Pager: [facility]: [phone] | [facility]: [phone]    Patient is a 69y old  Female who presents with a chief complaint of Worsening pain, weakness, BETTY (2020 12:42)      SUBJECTIVE / OVERNIGHT EVENTS: No acute events overnight. pain improved. No CP, SOB, fever, chills, nausea, vomiting, diarrhea, abdominal pain.    ADDITIONAL REVIEW OF SYSTEMS:    MEDICATIONS  (STANDING):  acetaminophen   Tablet .. 1000 milliGRAM(s) Oral every 8 hours  allopurinol 300 milliGRAM(s) Oral daily  aspirin enteric coated 81 milliGRAM(s) Oral daily  clopidogrel Tablet 75 milliGRAM(s) Oral daily  diltiazem    milliGRAM(s) Oral daily  enalapril 2.5 milliGRAM(s) Oral daily  enoxaparin Injectable 40 milliGRAM(s) SubCutaneous daily  gabapentin 400 milliGRAM(s) Oral two times a day  lactated ringers. 1000 milliLiter(s) (75 mL/Hr) IV Continuous <Continuous>  levothyroxine 125 MICROGram(s) Oral daily  pantoprazole    Tablet 40 milliGRAM(s) Oral before breakfast    MEDICATIONS  (PRN):  oxyCODONE    IR 15 milliGRAM(s) Oral every 4 hours PRN Severe Pain (7 - 10)      CAPILLARY BLOOD GLUCOSE        I&O's Summary    2020 07:01  -  2020 07:00  --------------------------------------------------------  IN: 1040 mL / OUT: 1100 mL / NET: -60 mL        PHYSICAL EXAM:  Vital Signs Last 24 Hrs  T(C): 36.7 (2020 04:28), Max: 36.7 (2020 04:28)  T(F): 98.1 (2020 04:28), Max: 98.1 (2020 04:28)  HR: 54 (2020 06:27) (54 - 87)  BP: 110/70 (2020 06:27) (92/53 - 133/84)  BP(mean): --  RR: 18 (2020 04:28) (18 - 18)  SpO2: 94% (2020 04:28) (94% - 97%)    CONSTITUTIONAL: well appearing, well developed, no apparent distress, but anxious   HEAD: Head atraumatic, normal cephalic shape.	  EYES: sclera clear bilaterally,   CARDIAC: regular rate, normal S1/S2, no murmurs  RESPIRATORY: no respiratory distress, normal breath sounds  CHEST: no erythema, warmth, tenderness or crepitus  GASTROINTESTINAL: soft, nontender, bowel sounds present  MUSCULOSKELETAL: +slight tenderness to palpation on the upper back. normal strength and ROM  NEUROLOGICAL: AAOx3, no focal deficits  SKIN: normal skin color, no apparent rashes  PSYCH: improved mood     LABS:                        11.8   12.47 )-----------( 235      ( 2020 06:48 )             38.5         141  |  104  |  18  ----------------------------<  90  4.0   |  24  |  0.97    Ca    9.0      2020 11:26    TPro  6.9  /  Alb  3.4  /  TBili  0.6  /  DBili  x   /  AST  28  /  ALT  17  /  AlkPhos  92        CARDIAC MARKERS ( 2020 11:26 )  x     / x     / 58 U/L / x     / x          Urinalysis Basic - ( 2020 21:11 )    Color: Light Yellow / Appearance: Clear / S.012 / pH: x  Gluc: x / Ketone: Trace  / Bili: Negative / Urobili: Negative   Blood: x / Protein: Trace / Nitrite: Negative   Leuk Esterase: Negative / RBC: 1 /hpf / WBC 0 /HPF   Sq Epi: x / Non Sq Epi: 0 /hpf / Bacteria: Negative          RADIOLOGY & ADDITIONAL TESTS:  Results Reviewed:   Imaging Personally Reviewed:  Electrocardiogram Personally Reviewed:    COORDINATION OF CARE:  Care Discussed with Consultants/Other Providers [Y/N]:  Prior or Outpatient Records Reviewed [Y/N]:

## 2020-01-29 NOTE — PROGRESS NOTE ADULT - ATTENDING COMMENTS
pt seen and examined.  above plan discussed on rounds today.  In addition,    69 F pmh CAD s/p stent in Nov 2019, HTN, HLD, OA, hypothyroidism p/w generalized muscle pain and weakness. Ddx: ? viral syndrome, ?due to BETTY, ? Rheumatologic issue   - ?statin induced myopathy ruled out --> cpk is Normal, will continue to hold statin.  - Infectious etiology has been ruled out-  viral panel is negative --> but could this from another virus which is not on the PCR probes?  - c/w IV fluids hydration  - BETTY resolved with hydration  Dispo: Plan for discharge home with home PT, pt is refusing the recommend NORMA placement.  stable for discharge today  Discharge time spent: 34 min
pt seen and examined.  above plan discussed on rounds today.  In addition,    69 F pmh CAD s/p stent in Nov 2019, HTN, HLD, OA, hypothyroidism p/w generalized muscle pain and weakness. Ddx: ? viral syndrome, ?due to BETTY, ? Rheumatologic issue   - ?statin induced myopathy ruled out --> cpk is Normal, will continue to hold statin.  - Infectious etiology has been ruled out-  viral panel is negative --> but could this from another virus which is not on the PCR probes?  - c/w IV fluids hydration  - BETTY resolved with hydration  Dispo: Plan for discharge home with home PT, pt is refusing the recommend NORMA placement.

## 2020-01-29 NOTE — PROGRESS NOTE ADULT - ASSESSMENT
69 year old F with multiple chronic problems including CAD s/p stent in Nov 2019, HTN, HLD, OA, hypothyroidism, mild gout, hx of PE in 1974 2/2 R knee surgery, hx of rheumatic fever at 17 years old, presenting with worsening myalgias and new-onset diffuse weakness. Found to have mild BETTY, leukocytosis, with no acute CTH or CXR findings. Admitted for workup of new onset weakness and BETTY.  WBC count downtrending, creatinine normalized, normal UA. Overall feels much better. Rheumatology workup outpatient for myalgias.

## 2020-01-29 NOTE — PROGRESS NOTE ADULT - PROBLEM SELECTOR PLAN 1
-Cr 1.63 on admission, s/p 3 L NS, improved to 0.97  -likely 2/2 dehydration given pt states she was not eating prior to arrival, was feeling too weak, therefore likely pre-renal  -will continue to monitor BMP daily -Cr 1.63 on admission, s/p 3 L NS, improved to 0.97  -likely 2/2 dehydration given pt states she was not eating prior to arrival, was feeling too weak, therefore likely pre-renal

## 2020-01-29 NOTE — PROGRESS NOTE ADULT - PROBLEM SELECTOR PLAN 2
-improved, will follow up rheumatology labs.  --non-focal weakness, CTH showed no signs of acute infarct, RVP negative, CK wnl.  --possible rheumatologic etiology, ?polymyalgia Rheumatica. Symptoms are improving without any steroids at this time so will hold off on Rheumatology consult but she can definitely follow up outpatient.   --continue to monitor for deterioration -improved, will follow up rheumatology labs.  --non-focal weakness, CTH showed no signs of acute infarct, RVP negative, CK wnl, positive ESR, CRP, RF,   --possible rheumatologic etiology, ?polymyalgia Rheumatica. Symptoms are improving without any steroids at this time so will hold off on Rheumatology consult but she can definitely follow up outpatient.   -plan for outpatient follow-up w/rheumatology clinic

## 2020-01-29 NOTE — PROGRESS NOTE ADULT - PROBLEM SELECTOR PLAN 5
-s/p stenting in Nov 2019  -Trop wnl on admission  -chest pain ttp, not exertional, likely costochondritis  -aspirin and plavix   -will continue to monitor -s/p stenting in Nov 2019  -Trop wnl on admission  -chest pain ttp, not exertional, likely costochondritis, has since resolved   -aspirin and plavix   -will continue to monitor  -holding statin in setting of myalgias (concern for statin-induced myopathy)

## 2020-01-29 NOTE — PROGRESS NOTE ADULT - PROBLEM SELECTOR PLAN 3
--unclear etiology. Ddx: unidentified viral illness, reactive leukocytosis (elevated ESR and CRP),   -- leukocytosis is downtrending  --no fever, UA wnl, no steroid use at home  --will continue to monitor, currently holding abx due to lack of s/sx of active infection --unclear etiology. Ddx: unidentified viral illness, reactive leukocytosis (elevated ESR and CRP),   -- leukocytosis downtrended, no s/sx of infection, no fever, no steroid use

## 2020-01-29 NOTE — DISCHARGE NOTE NURSING/CASE MANAGEMENT/SOCIAL WORK - PATIENT PORTAL LINK FT
You can access the FollowMyHealth Patient Portal offered by [facility] by registering at the following website: [email or web address]. By joining [facility]’s FollowMyHealth portal, you will also be able to view your health information using other applications (apps) compatible with our system.

## 2020-01-31 LAB — ANA TITR SER: NEGATIVE — SIGNIFICANT CHANGE UP

## 2020-02-05 ENCOUNTER — LABORATORY RESULT (OUTPATIENT)
Age: 70
End: 2020-02-05

## 2020-02-05 ENCOUNTER — APPOINTMENT (OUTPATIENT)
Dept: FAMILY MEDICINE | Facility: CLINIC | Age: 70
End: 2020-02-05
Payer: MEDICARE

## 2020-02-05 VITALS
DIASTOLIC BLOOD PRESSURE: 74 MMHG | WEIGHT: 161 LBS | SYSTOLIC BLOOD PRESSURE: 136 MMHG | OXYGEN SATURATION: 93 % | TEMPERATURE: 97.5 F | HEART RATE: 64 BPM | BODY MASS INDEX: 30.4 KG/M2 | HEIGHT: 61 IN

## 2020-02-05 DIAGNOSIS — R52 PAIN, UNSPECIFIED: ICD-10-CM

## 2020-02-05 PROCEDURE — 36415 COLL VENOUS BLD VENIPUNCTURE: CPT

## 2020-02-05 PROCEDURE — 99496 TRANSJ CARE MGMT HIGH F2F 7D: CPT | Mod: 25

## 2020-02-05 NOTE — HEALTH RISK ASSESSMENT
[No] : In the past 12 months have you used drugs other than those required for medical reasons? No [No falls in past year] : Patient reported no falls in the past year [0] : 1) Little interest or pleasure doing things: Not at all (0) [] : No [Audit-CScore] : 0 [TQU2Pdaie] : 0

## 2020-02-05 NOTE — PHYSICAL EXAM
[No Acute Distress] : no acute distress [Well Nourished] : well nourished [Well Developed] : well developed [Ill-Appearing] : ill-appearing [Normal Voice/Communication] : normal voice/communication [No Carotid Bruits] : no carotid bruits [No Abdominal Bruit] : a ~M bruit was not heard ~T in the abdomen [No Varicosities] : no varicosities [Pedal Pulses Present] : the pedal pulses are present [No Palpable Aorta] : no palpable aorta [No Extremity Clubbing/Cyanosis] : no extremity clubbing/cyanosis [No Edema] : there was no peripheral edema [Speech Grossly Normal] : speech grossly normal [Memory Grossly Normal] : memory grossly normal [Normal Affect] : the affect was normal [Alert and Oriented x3] : oriented to person, place, and time [Normal Insight/Judgement] : insight and judgment were intact [Normal Mood] : the mood was normal [Normal] : affect was normal and insight and judgment were intact [de-identified] : No guarding or rebound [de-identified] : pale [FreeTextEntry1] : GI [de-identified] : L/S spasm [de-identified] : diffuse joint pain without swelling

## 2020-02-05 NOTE — REVIEW OF SYSTEMS
[Fatigue] : fatigue [Joint Pain] : joint pain [Joint Stiffness] : joint stiffness [Back Pain] : back pain [Muscle Pain] : muscle pain [Negative] : Heme/Lymph [Chest Pain] : no chest pain [Palpitations] : no palpitations [Leg Claudication] : no leg claudication [Lower Ext Edema] : no lower extremity edema [Orthopnea] : no orthopnea [Abdominal Pain] : no abdominal pain [Nausea] : no nausea [Constipation] : no constipation [Diarrhea] : diarrhea [Vomiting] : no vomiting [Heartburn] : no heartburn [Melena] : no melena [Joint Swelling] : no joint swelling [FreeTextEntry7] : Bright red blood rectal bleeding [Muscle Weakness] : no muscle weakness

## 2020-02-05 NOTE — ASSESSMENT
[FreeTextEntry1] : Assessment and plan:\par \par 1. Status post hospitalization for dehydration and diffuse pain.\par \par 2. Inflammatory markers were elevated. Recommend rheumatological evaluation. Referral given.\par \par 3. History of hemorrhoids. Patient has had the rectal bleeding for several days. Today, there is no active bleeding. We'll obtain comprehensive blood work, which will include CBC, iron studies, vitamin B12 levels.\par \par 4. Hypertension good blood pressure control.\par \par 5. Presently, the patient is not bleeding rectally. She does have a history of ischemic heart disease, status post stent placement on Plavix 75 mg observe closely.\par \par 6. Hypothyroidism. Continue levothyroxine 125 mcg p.o. daily.\par \par 7. Hyperlipidemia. Continue lovastatin 20 mg p.o. daily

## 2020-02-05 NOTE — COUNSELING
[Adequate lighting] : Adequate lighting [Fall prevention counseling provided] : Fall prevention counseling provided [No throw rugs] : No throw rugs [Use proper foot wear] : Use proper foot wear [Behavioral health counseling provided] : Behavioral health counseling provided [Sleep ___ hours/day] : Sleep [unfilled] hours/day [Engage in a relaxing activity] : Engage in a relaxing activity [Plan in advance] : Plan in advance [AUDIT-C Screening administered and reviewed] : AUDIT-C Screening administered and reviewed [None] : None [Good understanding] : Patient has a good understanding of lifestyle changes and steps needed to achieve self management goal

## 2020-02-05 NOTE — HISTORY OF PRESENT ILLNESS
[Post-hospitalization from ___ Hospital] : Post-hospitalization from [unfilled] Hospital [Admitted on: ___] : The patient was admitted on [unfilled] [Discharge Summary] : discharge summary [Discharged on ___] : discharged on [unfilled] [Radiology Findings] : radiology findings [Pertinent Labs] : pertinent labs [Med Reconciliation] : medication reconciliation has been completed [Patient Contacted By: ____] : and contacted by [unfilled] [Discharge Med List] : discharge medication list [FreeTextEntry2] : Patient is a 69-year-old female, who presents today status post hospitalization patient was admitted on January 26 and discharged on January 28. She was admitted with the chief diagnosis of severe myalgias. She was found to be in acute renal insufficiency, secondary to severe dehydration. Patient improved with IV fluids subsequent discharged and since discharge. Patient has had an episode of rectal bleeding and myalgias

## 2020-02-06 LAB
ALBUMIN SERPL ELPH-MCNC: 3.9 G/DL
ALP BLD-CCNC: 152 U/L
ALT SERPL-CCNC: 22 U/L
ANION GAP SERPL CALC-SCNC: 15 MMOL/L
AST SERPL-CCNC: 22 U/L
BASOPHILS # BLD AUTO: 0.55 K/UL
BASOPHILS NFR BLD AUTO: 5.3 %
BILIRUB SERPL-MCNC: 0.2 MG/DL
BUN SERPL-MCNC: 16 MG/DL
CALCIUM SERPL-MCNC: 9.4 MG/DL
CHLORIDE SERPL-SCNC: 102 MMOL/L
CHOLEST SERPL-MCNC: 130 MG/DL
CHOLEST/HDLC SERPL: 2.9 RATIO
CO2 SERPL-SCNC: 28 MMOL/L
CREAT SERPL-MCNC: 1.13 MG/DL
CRP SERPL-MCNC: 2.67 MG/DL
EOSINOPHIL # BLD AUTO: 0.55 K/UL
EOSINOPHIL NFR BLD AUTO: 5.3 %
ERYTHROCYTE [SEDIMENTATION RATE] IN BLOOD BY WESTERGREN METHOD: >120 MM/HR
FERRITIN SERPL-MCNC: 166 NG/ML
FOLATE SERPL-MCNC: >20 NG/ML
GLUCOSE SERPL-MCNC: 92 MG/DL
HCT VFR BLD CALC: 35.5 %
HDLC SERPL-MCNC: 45 MG/DL
HGB BLD-MCNC: 10.5 G/DL
IRON SATN MFR SERPL: 16 %
IRON SERPL-MCNC: 40 UG/DL
LDLC SERPL CALC-MCNC: 70 MG/DL
LYMPHOCYTES # BLD AUTO: 1.45 K/UL
LYMPHOCYTES NFR BLD AUTO: 14 %
MAN DIFF?: NORMAL
MCHC RBC-ENTMCNC: 26.8 PG
MCHC RBC-ENTMCNC: 29.6 GM/DL
MCV RBC AUTO: 90.6 FL
MONOCYTES # BLD AUTO: 0.63 K/UL
MONOCYTES NFR BLD AUTO: 6.1 %
NEUTROPHILS # BLD AUTO: 7.1 K/UL
NEUTROPHILS NFR BLD AUTO: 68.4 %
PLATELET # BLD AUTO: 439 K/UL
POTASSIUM SERPL-SCNC: 4.6 MMOL/L
PROT SERPL-MCNC: 6.9 G/DL
RBC # BLD: 3.92 M/UL
RBC # FLD: 14.7 %
RHEUMATOID FACT SER QL: <10 IU/ML
SODIUM SERPL-SCNC: 144 MMOL/L
T4 FREE SERPL-MCNC: 1.3 NG/DL
TIBC SERPL-MCNC: 253 UG/DL
TRIGL SERPL-MCNC: 76 MG/DL
TSH SERPL-ACNC: 1.46 UIU/ML
UIBC SERPL-MCNC: 213 UG/DL
VIT B12 SERPL-MCNC: 1396 PG/ML
WBC # FLD AUTO: 10.38 K/UL

## 2020-02-12 LAB — ANA SER IF-ACNC: NEGATIVE

## 2020-02-14 ENCOUNTER — APPOINTMENT (OUTPATIENT)
Dept: RHEUMATOLOGY | Facility: CLINIC | Age: 70
End: 2020-02-14
Payer: MEDICARE

## 2020-02-14 VITALS — RESPIRATION RATE: 14 BRPM | DIASTOLIC BLOOD PRESSURE: 72 MMHG | HEART RATE: 72 BPM | SYSTOLIC BLOOD PRESSURE: 126 MMHG

## 2020-02-14 DIAGNOSIS — M47.892 OTHER SPONDYLOSIS, CERVICAL REGION: ICD-10-CM

## 2020-02-14 DIAGNOSIS — M79.10 MYALGIA, UNSPECIFIED SITE: ICD-10-CM

## 2020-02-14 PROCEDURE — 99204 OFFICE O/P NEW MOD 45 MIN: CPT

## 2020-02-19 ENCOUNTER — APPOINTMENT (OUTPATIENT)
Dept: FAMILY MEDICINE | Facility: CLINIC | Age: 70
End: 2020-02-19

## 2020-02-24 PROBLEM — M79.10 MYALGIA: Status: ACTIVE | Noted: 2020-02-24

## 2020-02-24 NOTE — PHYSICAL EXAM
[General Appearance - Alert] : alert [Sclera] : the sclera and conjunctiva were normal [General Appearance - Well Nourished] : well nourished [General Appearance - In No Acute Distress] : in no acute distress [Extraocular Movements] : extraocular movements were intact [PERRL With Normal Accommodation] : pupils were equal in size, round, and reactive to light [Oropharynx] : the oropharynx was normal [Neck Appearance] : the appearance of the neck was normal [Heart Rate And Rhythm] : heart rate was normal and rhythm regular [Auscultation Breath Sounds / Voice Sounds] : lungs were clear to auscultation bilaterally [Heart Sounds] : normal S1 and S2 [Murmurs] : no murmurs [Edema] : there was no peripheral edema [Bowel Sounds] : normal bowel sounds [Abdomen Soft] : soft [Abdomen Tenderness] : non-tender [Cervical Lymph Nodes Enlarged Anterior Bilaterally] : anterior cervical [Supraclavicular Lymph Nodes Enlarged Bilaterally] : supraclavicular [No CVA Tenderness] : no ~M costovertebral angle tenderness [No Spinal Tenderness] : no spinal tenderness [Abnormal Walk] : normal gait [Nail Clubbing] : no clubbing  or cyanosis of the fingernails [Musculoskeletal - Swelling] : no joint swelling seen [Motor Tone] : muscle strength and tone were normal [Sensation] : the sensory exam was normal to light touch and pinprick [No Focal Deficits] : no focal deficits [Motor Exam] : the motor exam was normal [] : no rash [Oriented To Time, Place, And Person] : oriented to person, place, and time [FreeTextEntry1] : strength 5/5 x 4  [Impaired Insight] : insight and judgment were intact [Affect] : the affect was normal

## 2020-02-24 NOTE — DATA REVIEWED
[FreeTextEntry1] : Available notes, and pertinent labs & imaging in EMR and HIE reviewed. Pertinent labs and imaging summarized in HPI.

## 2020-02-24 NOTE — HISTORY OF PRESENT ILLNESS
[FreeTextEntry1] : EZEQUIEL WRIGHT is a 69 year old woman who presents with acute onset severe myalgias in late Jan, found to have BETTY and BRBPR 2/2 hemorrhoids. Initially developed head/neck pain which began to move down to shoulders to mid-chest with pain lifting her arms or taking deep breaths. + preceding sinus congestion and suspects she might have had a URI as she was having fevers. Got her flu shot a few weeks before but not felt to be likely etiology. Never had prior episodes. \par \par Presently feels well, no myalgias, arthralgias, synovitis, rashes, weakness, paresthesias. No CP, SOB, cough, further infectious sx. \par \par + hx of gout, clinically quiescent for years on allopurinol 300mg daily. \par + spinal DJD with chronic back pain, stable, takes tylenol prn \par \par Labs - normocytic anemia, ESR 54 --> >120, CRP 17 --> 2.67 , ferritin 166, + promyelocytes \par Renal fxn normalized, RF, CPK, MEGAN \par DEXA 2017

## 2020-02-24 NOTE — ASSESSMENT
[FreeTextEntry1] : EZEQUIEL WRIGHT is a 69 year old woman who presents with markedly elevated ESR in setting of recent episode of acute upper body myalgias, now resolved, + preceding infection, no CPK elevation. Paucity of CTD or myositis symptoms at present. ESR etiology unclear. + anemia. + stable DJD in spine. + quiescent gout on ULT. \par \par - repeat ESR/CRP in 4-6 weeks from last check, check SPEP\par - will determine f/u based on repeat testing

## 2020-03-02 ENCOUNTER — APPOINTMENT (OUTPATIENT)
Dept: FAMILY MEDICINE | Facility: CLINIC | Age: 70
End: 2020-03-02
Payer: MEDICARE

## 2020-03-02 VITALS
DIASTOLIC BLOOD PRESSURE: 77 MMHG | SYSTOLIC BLOOD PRESSURE: 131 MMHG | BODY MASS INDEX: 30.4 KG/M2 | OXYGEN SATURATION: 94 % | HEART RATE: 63 BPM | WEIGHT: 161 LBS | HEIGHT: 61 IN | TEMPERATURE: 97.6 F

## 2020-03-02 DIAGNOSIS — M54.9 DORSALGIA, UNSPECIFIED: ICD-10-CM

## 2020-03-02 DIAGNOSIS — G89.29 DORSALGIA, UNSPECIFIED: ICD-10-CM

## 2020-03-02 PROCEDURE — 99214 OFFICE O/P EST MOD 30 MIN: CPT

## 2020-03-02 NOTE — ASSESSMENT
[FreeTextEntry1] : Assessment and plan:\par \par 1.  Chronic pain syndrome multifactorial patient seen on a regular basis by physiatry.\par \par 2. Inflammatory markers were elevated.  Since seen by rheumatology extensive work-up ordered.\par \par 3. History of hemorrhoids. Patient has had the rectal bleeding for several days. Today, there is no active bleeding. We'll obtain comprehensive blood work, which will include CBC, iron studies, vitamin B12 levels.\par \par 4. Hypertension good blood pressure control.\par \par 5. Presently, the patient is not bleeding rectally. She does have a history of ischemic heart disease, status post stent placement on Plavix 75 mg observe closely.\par \par 6. Hypothyroidism. Continue levothyroxine 125 mcg p.o. daily.\par \par 7. Hyperlipidemia. Continue lovastatin 20 mg p.o. daily

## 2020-03-02 NOTE — COUNSELING
[Fall prevention counseling provided] : Fall prevention counseling provided [No throw rugs] : No throw rugs [Adequate lighting] : Adequate lighting [Use proper foot wear] : Use proper foot wear [Behavioral health counseling provided] : Behavioral health counseling provided [Sleep ___ hours/day] : Sleep [unfilled] hours/day [Plan in advance] : Plan in advance [Engage in a relaxing activity] : Engage in a relaxing activity [AUDIT-C Screening administered and reviewed] : AUDIT-C Screening administered and reviewed [Potential consequences of obesity discussed] : Potential consequences of obesity discussed [Benefits of weight loss discussed] : Benefits of weight loss discussed [Encouraged to maintain food diary] : Encouraged to maintain food diary [Structured Weight Management Program suggested:] : Structured weight management program suggested [Encouraged to increase physical activity] : Encouraged to increase physical activity [Encouraged to use exercise tracking device] : Encouraged to use exercise tracking device [Weigh Self Weekly] : weigh self weekly [Decrease Portions] : decrease portions [____ min/wk Activity] : [unfilled] min/wk activity [None] : None [Keep Food Diary] : keep food diary [Good understanding] : Patient has a good understanding of lifestyle changes and steps needed to achieve self management goal

## 2020-03-02 NOTE — HISTORY OF PRESENT ILLNESS
[de-identified] : Patient is a 69-year-old female who presents today for follow-up and disease management we will be addressing hypothyroidism ischemic heart disease patient presently chest pain-free status post stent placement x2 chronic back pain patient still experiences claudication when ambulating, chronic obstructive pulmonary disease hypertension recurrent rectal bleeding secondary to hemorrhoids and hyperlipidemia.\par \par Presently the patient is awake alert and oriented x3 in no acute distress she is calm and cooperative presently denies chest pain. [FreeTextEntry1] : Please see HPI

## 2020-03-02 NOTE — REVIEW OF SYSTEMS
[Chest Pain] : no chest pain [Fatigue] : fatigue [Leg Claudication] : no leg claudication [Palpitations] : no palpitations [Lower Ext Edema] : no lower extremity edema [Abdominal Pain] : no abdominal pain [Orthopnea] : no orthopnea [Diarrhea] : diarrhea [Constipation] : no constipation [Nausea] : no nausea [Heartburn] : no heartburn [Melena] : no melena [Vomiting] : no vomiting [Joint Stiffness] : joint stiffness [Joint Pain] : joint pain [Muscle Weakness] : no muscle weakness [Muscle Pain] : muscle pain [Joint Swelling] : no joint swelling [Back Pain] : back pain [Negative] : Neurological [FreeTextEntry7] : Bright red blood rectal bleeding

## 2020-03-02 NOTE — PHYSICAL EXAM
[Well Nourished] : well nourished [Well Developed] : well developed [No Acute Distress] : no acute distress [Normal Voice/Communication] : normal voice/communication [Ill-Appearing] : ill-appearing [No Carotid Bruits] : no carotid bruits [No Abdominal Bruit] : a ~M bruit was not heard ~T in the abdomen [No Varicosities] : no varicosities [Pedal Pulses Present] : the pedal pulses are present [No Edema] : there was no peripheral edema [No Palpable Aorta] : no palpable aorta [No Extremity Clubbing/Cyanosis] : no extremity clubbing/cyanosis [Speech Grossly Normal] : speech grossly normal [Memory Grossly Normal] : memory grossly normal [Alert and Oriented x3] : oriented to person, place, and time [Normal Mood] : the mood was normal [Normal Affect] : the affect was normal [de-identified] : pale [Normal Insight/Judgement] : insight and judgment were intact [Normal] : affect was normal and insight and judgment were intact [FreeTextEntry1] : GI [de-identified] : L/S spasm [de-identified] : No guarding or rebound [de-identified] : diffuse joint pain without swelling

## 2020-03-02 NOTE — HEALTH RISK ASSESSMENT
[] : No [No] : In the past 12 months have you used drugs other than those required for medical reasons? No [No falls in past year] : Patient reported no falls in the past year [HXH2Aakpf] : 0 [Audit-CScore] : 0 [0] : 2) Feeling down, depressed, or hopeless: Not at all (0)

## 2020-03-24 NOTE — DISCHARGE NOTE NURSING/CASE MANAGEMENT/SOCIAL WORK - CASE MANAGER'S NAME
Regarding: Fatigue/Sore throat  ----- Message from Maury Jurado sent at 3/24/2020  8:28 AM CDT -----  Patient Name: Paulie Dempsey  Specialist or PCP Full Name:No pcp  Pregnant (If Yes, how long?):Na    Symptoms: Cough/ sore throat /tiredness      If fever, cough, and/or shortness of breath,      Have you traveled outside of the country in the last 14 days?: No      Have you had close contact with someone who has tested positive for the Coronavirus?:  Yes    Call Back #:[phone]  Call Center Account #:[id number]       CITLALI CARVALHO RN [facility]   [phone]

## 2020-04-01 ENCOUNTER — APPOINTMENT (OUTPATIENT)
Dept: RHEUMATOLOGY | Facility: CLINIC | Age: 70
End: 2020-04-01

## 2020-04-15 ENCOUNTER — APPOINTMENT (OUTPATIENT)
Dept: CARDIOLOGY | Facility: CLINIC | Age: 70
End: 2020-04-15
Payer: MEDICARE

## 2020-04-15 ENCOUNTER — NON-APPOINTMENT (OUTPATIENT)
Age: 70
End: 2020-04-15

## 2020-04-15 VITALS
RESPIRATION RATE: 14 BRPM | HEART RATE: 67 BPM | HEIGHT: 61 IN | BODY MASS INDEX: 30.58 KG/M2 | DIASTOLIC BLOOD PRESSURE: 77 MMHG | WEIGHT: 162 LBS | SYSTOLIC BLOOD PRESSURE: 114 MMHG | OXYGEN SATURATION: 96 %

## 2020-04-15 DIAGNOSIS — R07.81 PLEURODYNIA: ICD-10-CM

## 2020-04-15 PROCEDURE — 93000 ELECTROCARDIOGRAM COMPLETE: CPT

## 2020-04-15 PROCEDURE — 93306 TTE W/DOPPLER COMPLETE: CPT

## 2020-04-15 PROCEDURE — 99214 OFFICE O/P EST MOD 30 MIN: CPT

## 2020-04-15 NOTE — ASSESSMENT
[FreeTextEntry1] : The patient was referred to the echocardiography laboratory. Echocardiogram reveals no evidence of pericardial effusion. There is no evidence of pulmonary hypertension.\par \par Impression:\par 1. Very atypicalpleuritic chest pain possiblycostochondritis. Based onEKG and lack ofviral prodrome doubt pericarditis. Based on lack of shortness of breath, excellent pulse oximetry, and lack of pulmonary hypertension pulmonary embolus essentially ruled out. Patient does not wish to go further testing at this time due to call the crisis\par 2. Coronary disease with obstructive disease involving the circumflex and right coronary artery status post stenting\par 3. Dyslipidemia at goal on moderate dose statin at last check\par 4. Hypertension well controlled\par \par Plan:\par 1. Continue current regimen\par 2.Patint to self monitor

## 2020-04-15 NOTE — REASON FOR VISIT
[Follow-Up - Clinic] : a clinic follow-up of [Chest Pain] : chest pain [Coronary Artery Disease] : coronary artery disease [FreeTextEntry1] : She returns for followup. She states that over the last 2 weeks she has a pain over the left breast when she takes a breath only when she lies down. She does not have it sitting up. It is not exertion related. 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

## 2020-04-15 NOTE — PHYSICAL EXAM
[General Appearance - Well Developed] : well developed [Normal Appearance] : normal appearance [Well Groomed] : well groomed [General Appearance - Well Nourished] : well nourished [No Deformities] : no deformities [General Appearance - In No Acute Distress] : no acute distress [Normal Oral Mucosa] : normal oral mucosa [No Oral Pallor] : no oral pallor [No Oral Cyanosis] : no oral cyanosis [Normal Jugular Venous A Waves Present] : normal jugular venous A waves present [Normal Jugular Venous V Waves Present] : normal jugular venous V waves present [No Jugular Venous Triana A Waves] : no jugular venous triana A waves [Respiration, Rhythm And Depth] : normal respiratory rhythm and effort [Exaggerated Use Of Accessory Muscles For Inspiration] : no accessory muscle use [Auscultation Breath Sounds / Voice Sounds] : lungs were clear to auscultation bilaterally [Abdomen Soft] : soft [Abdomen Tenderness] : non-tender [Abdomen Mass (___ Cm)] : no abdominal mass palpated [Abnormal Walk] : normal gait [Gait - Sufficient For Exercise Testing] : the gait was sufficient for exercise testing [Nail Clubbing] : no clubbing of the fingernails [Cyanosis, Localized] : no localized cyanosis [Petechial Hemorrhages (___cm)] : no petechial hemorrhages [Skin Color & Pigmentation] : normal skin color and pigmentation [] : no rash [No Venous Stasis] : no venous stasis [Skin Lesions] : no skin lesions [No Skin Ulcers] : no skin ulcer [No Xanthoma] : no  xanthoma was observed [Oriented To Time, Place, And Person] : oriented to person, place, and time [Affect] : the affect was normal [Mood] : the mood was normal [No Anxiety] : not feeling anxious [5th Left ICS - MCL] : palpated at the 5th LICS in the midclavicular line [Normal] : normal [Normal Rate] : normal [Rhythm Regular] : regular [Normal S1] : normal S1 [Normal S2] : normal S2 [II] : a grade 2 [2+] : left 2+ [No Abnormalities] : the abdominal aorta was not enlarged and no bruit was heard [No Pitting Edema] : no pitting edema present [S3] : no S3 [S4] : no S4 [Right Carotid Bruit] : no bruit heard over the right carotid [Left Carotid Bruit] : no bruit heard over the left carotid [Right Femoral Bruit] : no bruit heard over the right femoral artery [Left Femoral Bruit] : no bruit heard over the left femoral artery

## 2020-05-06 ENCOUNTER — LABORATORY RESULT (OUTPATIENT)
Age: 70
End: 2020-05-06

## 2020-05-06 ENCOUNTER — APPOINTMENT (OUTPATIENT)
Dept: FAMILY MEDICINE | Facility: CLINIC | Age: 70
End: 2020-05-06
Payer: MEDICARE

## 2020-05-06 VITALS
TEMPERATURE: 98.3 F | HEART RATE: 98 BPM | DIASTOLIC BLOOD PRESSURE: 76 MMHG | BODY MASS INDEX: 30.58 KG/M2 | OXYGEN SATURATION: 99 % | HEIGHT: 61 IN | SYSTOLIC BLOOD PRESSURE: 136 MMHG | WEIGHT: 162 LBS

## 2020-05-06 DIAGNOSIS — R25.2 CRAMP AND SPASM: ICD-10-CM

## 2020-05-06 PROCEDURE — 99214 OFFICE O/P EST MOD 30 MIN: CPT

## 2020-05-06 NOTE — REVIEW OF SYSTEMS
[Fatigue] : fatigue [Abdominal Pain] : abdominal pain [Nausea] : nausea [Vomiting] : vomiting [Joint Pain] : joint pain [Joint Stiffness] : joint stiffness [Muscle Pain] : muscle pain [Back Pain] : back pain [Negative] : Heme/Lymph [Chest Pain] : no chest pain [Palpitations] : no palpitations [Leg Claudication] : no leg claudication [Lower Ext Edema] : no lower extremity edema [Orthopnea] : no orthopnea [Constipation] : no constipation [Diarrhea] : diarrhea [Heartburn] : no heartburn [Melena] : no melena [Joint Swelling] : no joint swelling [Muscle Weakness] : no muscle weakness

## 2020-05-06 NOTE — HISTORY OF PRESENT ILLNESS
[Severe] : severe [___ Days ago] : [unfilled] days ago [Nausea] : nausea [Vomiting] : vomiting [Anorexia] : anorexia [Abdominal Pain] : abdominal pain [Worsening] : worsening [Diarrhea] : no diarrhea [Constipation] : no constipation [Sore Throat] : no sore throat [FreeTextEntry5] : nothing [FreeTextEntry8] : Patient is a 70-year-old female who presents today for an acute visit patient complaining of severe abdominal pain cramping and spasm patient states that she has had normal bowel movements but the pain is accompanied by nausea and vomiting recently patient did hydrogen peroxide oral cleanse only 3 drops ounces of water for 5 days prior to getting this abdominal discomfort patient also has had problems with her hemorrhoids she was prescribed Proctosol cream and suppositories hemorrhoids have resolved she also has underlying hypertension and hyperlipidemia.  Patient is complaining of poor oral intake unfortunately her appetite is not there, hemorrhoidal bleeding has stopped for the past 2 weeks.

## 2020-05-06 NOTE — ASSESSMENT
[FreeTextEntry1] : Assessment and plan:\par \par 1.  Abdominal pain, nausea and vomiting status post hydrogen peroxide oral cleanse physical exam patient did not have guarding or rebound but did have right lower quadrant pain with deep palpation and mild epigastric discomfort with deep palpation.  I recommend CT scan of abdomen and pelvis with oral contrast I had recommended that the patient go to the emergency room she declines that at this time she said due to the pandemic she prefers being away from the hospital.  My recommendations are clear liquid diet and start Pepcid 20 mg p.o. twice a day patient will call me if symptoms worsen in any way comprehensive blood work was drawn in office by examiner.  I told the patient that if the pain worsens or if she sees any blood in her vomitus or stool she needs to go directly to the emergency room she agreed to do that.\par \par 2.  Discontinue the hydrogen peroxide, Pepcid, clear liquid diet and CT scan of abdomen and pelvis.\par \par Face-to-face with patient 30 minutes more than 50% of that time was spent on coordination of care and counseling regarding her abdominal discomfort which may be multifactorial and the differential can include gastroenteritis, colitis, atypical presentation of diverticulitis doubt pancreatitis.

## 2020-05-06 NOTE — PHYSICAL EXAM
[No Acute Distress] : no acute distress [Well Nourished] : well nourished [Well Developed] : well developed [Normal Voice/Communication] : normal voice/communication [Ill-Appearing] : ill-appearing [No Carotid Bruits] : no carotid bruits [No Abdominal Bruit] : a ~M bruit was not heard ~T in the abdomen [No Varicosities] : no varicosities [Pedal Pulses Present] : the pedal pulses are present [No Edema] : there was no peripheral edema [No Palpable Aorta] : no palpable aorta [No Extremity Clubbing/Cyanosis] : no extremity clubbing/cyanosis [Soft] : abdomen soft [Speech Grossly Normal] : speech grossly normal [Memory Grossly Normal] : memory grossly normal [Normal Affect] : the affect was normal [Alert and Oriented x3] : oriented to person, place, and time [Normal Mood] : the mood was normal [Normal Insight/Judgement] : insight and judgment were intact [Normal] : affect was normal and insight and judgment were intact [de-identified] : pale [de-identified] : No guarding or rebound, hyperactive bowel signs [FreeTextEntry1] : GI [de-identified] : L/S spasm [de-identified] : diffuse joint pain without swelling

## 2020-05-07 ENCOUNTER — OUTPATIENT (OUTPATIENT)
Dept: OUTPATIENT SERVICES | Facility: HOSPITAL | Age: 70
LOS: 1 days | End: 2020-05-07
Payer: MEDICARE

## 2020-05-07 ENCOUNTER — APPOINTMENT (OUTPATIENT)
Dept: CT IMAGING | Facility: HOSPITAL | Age: 70
End: 2020-05-07
Payer: MEDICARE

## 2020-05-07 ENCOUNTER — RESULT REVIEW (OUTPATIENT)
Age: 70
End: 2020-05-07

## 2020-05-07 DIAGNOSIS — K46.9 UNSPECIFIED ABDOMINAL HERNIA WITHOUT OBSTRUCTION OR GANGRENE: Chronic | ICD-10-CM

## 2020-05-07 DIAGNOSIS — Z98.890 OTHER SPECIFIED POSTPROCEDURAL STATES: Chronic | ICD-10-CM

## 2020-05-07 DIAGNOSIS — R10.9 UNSPECIFIED ABDOMINAL PAIN: ICD-10-CM

## 2020-05-07 DIAGNOSIS — Z96.653 PRESENCE OF ARTIFICIAL KNEE JOINT, BILATERAL: Chronic | ICD-10-CM

## 2020-05-07 DIAGNOSIS — Z98.89 OTHER SPECIFIED POSTPROCEDURAL STATES: Chronic | ICD-10-CM

## 2020-05-07 LAB
ALBUMIN SERPL ELPH-MCNC: 4.2 G/DL
ALP BLD-CCNC: 78 U/L
ALT SERPL-CCNC: 9 U/L
AMYLASE/CREAT SERPL: 43 U/L
ANION GAP SERPL CALC-SCNC: 17 MMOL/L
AST SERPL-CCNC: 25 U/L
BILIRUB SERPL-MCNC: 0.4 MG/DL
BUN SERPL-MCNC: 20 MG/DL
CALCIUM SERPL-MCNC: 9.6 MG/DL
CHLORIDE SERPL-SCNC: 100 MMOL/L
CHOLEST SERPL-MCNC: 118 MG/DL
CHOLEST/HDLC SERPL: 2.7 RATIO
CO2 SERPL-SCNC: 24 MMOL/L
CREAT SERPL-MCNC: 1.14 MG/DL
FERRITIN SERPL-MCNC: 99 NG/ML
FOLATE SERPL-MCNC: >20 NG/ML
GLUCOSE SERPL-MCNC: 87 MG/DL
HDLC SERPL-MCNC: 44 MG/DL
IRON SATN MFR SERPL: 13 %
IRON SERPL-MCNC: 36 UG/DL
LDLC SERPL CALC-MCNC: 55 MG/DL
LPL SERPL-CCNC: 18 U/L
POTASSIUM SERPL-SCNC: 4.3 MMOL/L
PROT SERPL-MCNC: 6.8 G/DL
SODIUM SERPL-SCNC: 142 MMOL/L
T4 FREE SERPL-MCNC: 1.4 NG/DL
TIBC SERPL-MCNC: 285 UG/DL
TRIGL SERPL-MCNC: 97 MG/DL
TSH SERPL-ACNC: 0.37 UIU/ML
UIBC SERPL-MCNC: 249 UG/DL
VIT B12 SERPL-MCNC: 1559 PG/ML

## 2020-05-07 PROCEDURE — 74176 CT ABD & PELVIS W/O CONTRAST: CPT

## 2020-05-07 PROCEDURE — 74176 CT ABD & PELVIS W/O CONTRAST: CPT | Mod: 26

## 2020-05-08 LAB
BASOPHILS # BLD AUTO: 0.47 K/UL
BASOPHILS NFR BLD AUTO: 3.6 %
EOSINOPHIL # BLD AUTO: 0.47 K/UL
EOSINOPHIL NFR BLD AUTO: 3.6 %
H PYLORI AB SER-ACNC: <5 UNITS
H PYLORI IGA SER-ACNC: 20.3 UNITS
HCT VFR BLD CALC: 38.9 %
HGB BLD-MCNC: 11.5 G/DL
LYMPHOCYTES # BLD AUTO: 1.39 K/UL
LYMPHOCYTES NFR BLD AUTO: 10.7 %
MAN DIFF?: NORMAL
MCHC RBC-ENTMCNC: 26.3 PG
MCHC RBC-ENTMCNC: 29.6 GM/DL
MCV RBC AUTO: 89 FL
MONOCYTES # BLD AUTO: 0.81 K/UL
MONOCYTES NFR BLD AUTO: 6.2 %
NEUTROPHILS # BLD AUTO: 9.52 K/UL
NEUTROPHILS NFR BLD AUTO: 72.3 %
PLATELET # BLD AUTO: 385 K/UL
RBC # BLD: 4.37 M/UL
RBC # FLD: 15.3 %
WBC # FLD AUTO: 13 K/UL

## 2020-05-11 ENCOUNTER — APPOINTMENT (OUTPATIENT)
Dept: FAMILY MEDICINE | Facility: CLINIC | Age: 70
End: 2020-05-11

## 2020-05-12 ENCOUNTER — TRANSCRIPTION ENCOUNTER (OUTPATIENT)
Age: 70
End: 2020-05-12

## 2020-05-28 ENCOUNTER — APPOINTMENT (OUTPATIENT)
Dept: FAMILY MEDICINE | Facility: CLINIC | Age: 70
End: 2020-05-28
Payer: MEDICARE

## 2020-05-28 VITALS
DIASTOLIC BLOOD PRESSURE: 79 MMHG | TEMPERATURE: 98.4 F | OXYGEN SATURATION: 97 % | HEIGHT: 61 IN | WEIGHT: 156.38 LBS | SYSTOLIC BLOOD PRESSURE: 132 MMHG | HEART RATE: 72 BPM | BODY MASS INDEX: 29.52 KG/M2

## 2020-05-28 PROCEDURE — 99495 TRANSJ CARE MGMT MOD F2F 14D: CPT

## 2020-05-28 RX ORDER — CIPROFLOXACIN HYDROCHLORIDE 500 MG/1
500 TABLET, FILM COATED ORAL
Qty: 20 | Refills: 0 | Status: COMPLETED | COMMUNITY
Start: 2020-05-08 | End: 2020-05-28

## 2020-05-28 RX ORDER — ZOLPIDEM TARTRATE 10 MG/1
10 TABLET ORAL
Qty: 30 | Refills: 3 | Status: COMPLETED | COMMUNITY
Start: 2016-03-25 | End: 2020-05-28

## 2020-05-28 NOTE — ASSESSMENT
[FreeTextEntry1] : Assessment and plan:\par \par 1.  Status post hospitalization for abdominal pain patient was diagnosed with small bowel obstruction secondary to adhesions patient will be having upper and lower endoscopy which will be done by Dr.Neeraj Keith at [facility] gastroenterology.  Patient will continue full liquid diet and slowly advance as tolerated.\par \par 2.  Hypertension good blood pressure control patient will continue diltiazem, enalapril and furosemide.\par \par 3.  Ischemic heart disease status post stent patient presently on Plavix 75 mg p.o. daily tolerating medications well but prior to endoscopies patient will need to hold all anticoagulation.\par \par 4.  Hyperlipidemia continue lovastatin 20 mg p.o. daily\par \par 5.  Hypothyroidism patient stable on levothyroxine 125 mcg p.o. daily.\par \par 6.  Patient will continue present medical management and continue present diet advance diet as tolerated and follow-up with gastroenterology.

## 2020-05-28 NOTE — REVIEW OF SYSTEMS
[Fatigue] : fatigue [Chest Pain] : no chest pain [Palpitations] : no palpitations [Lower Ext Edema] : no lower extremity edema [Leg Claudication] : no leg claudication [Orthopnea] : no orthopnea [Abdominal Pain] : abdominal pain [Nausea] : no nausea [Constipation] : no constipation [Diarrhea] : diarrhea [Vomiting] : no vomiting [Heartburn] : no heartburn [Joint Pain] : joint pain [Melena] : no melena [Joint Stiffness] : joint stiffness [Joint Swelling] : no joint swelling [Muscle Weakness] : no muscle weakness [Muscle Pain] : muscle pain [Back Pain] : back pain [Negative] : Heme/Lymph

## 2020-05-28 NOTE — COUNSELING
[Fall prevention counseling provided] : Fall prevention counseling provided [No throw rugs] : No throw rugs [Adequate lighting] : Adequate lighting [Behavioral health counseling provided] : Behavioral health counseling provided [Use proper foot wear] : Use proper foot wear [Sleep ___ hours/day] : Sleep [unfilled] hours/day [Engage in a relaxing activity] : Engage in a relaxing activity [Plan in advance] : Plan in advance [AUDIT-C Screening administered and reviewed] : AUDIT-C Screening administered and reviewed [None] : None [Good understanding] : Patient has a good understanding of lifestyle changes and steps needed to achieve self management goal

## 2020-05-28 NOTE — PHYSICAL EXAM
[No Acute Distress] : no acute distress [Well Nourished] : well nourished [Ill-Appearing] : ill-appearing [Well Developed] : well developed [Normal Voice/Communication] : normal voice/communication [No Carotid Bruits] : no carotid bruits [No Abdominal Bruit] : a ~M bruit was not heard ~T in the abdomen [No Varicosities] : no varicosities [Pedal Pulses Present] : the pedal pulses are present [No Edema] : there was no peripheral edema [No Palpable Aorta] : no palpable aorta [No Extremity Clubbing/Cyanosis] : no extremity clubbing/cyanosis [Soft] : abdomen soft [Non Tender] : non-tender [Non-distended] : non-distended [No Masses] : no abdominal mass palpated [Speech Grossly Normal] : speech grossly normal [Memory Grossly Normal] : memory grossly normal [Normal Affect] : the affect was normal [Normal Mood] : the mood was normal [Alert and Oriented x3] : oriented to person, place, and time [Normal Insight/Judgement] : insight and judgment were intact [Normal] : affect was normal and insight and judgment were intact [de-identified] : No guarding or rebound, hyperactive bowel signs [de-identified] : pale [de-identified] : L/S spasm [FreeTextEntry1] : GI [de-identified] : diffuse joint pain without swelling

## 2020-05-28 NOTE — HISTORY OF PRESENT ILLNESS
[Post-hospitalization from ___ Hospital] : Post-hospitalization from [unfilled] Hospital [Admitted on: ___] : The patient was admitted on [unfilled] [Discharged on ___] : discharged on [unfilled] [Discharge Summary] : discharge summary [Pertinent Labs] : pertinent labs [Radiology Findings] : radiology findings [Discharge Med List] : discharge medication list [Med Reconciliation] : medication reconciliation has been completed [Patient Contacted By: ____] : and contacted by [unfilled] [FreeTextEntry2] : Patient is a 70-year-old female who presents today for follow-up status post hospitalization patient was hospitalized at [facility] admitted on May 15 discharged on May 19.  Diagnosis abdominal pain secondary to small bowel obstruction secondary to adhesions.

## 2020-05-28 NOTE — HEALTH RISK ASSESSMENT
[] : No [No] : In the past 12 months have you used drugs other than those required for medical reasons? No [No falls in past year] : Patient reported no falls in the past year [0] : 2) Feeling down, depressed, or hopeless: Not at all (0) [Audit-CScore] : 0 [[id number]] : 0

## 2020-06-03 ENCOUNTER — NON-APPOINTMENT (OUTPATIENT)
Age: 70
End: 2020-06-03

## 2020-06-03 ENCOUNTER — APPOINTMENT (OUTPATIENT)
Dept: CARDIOLOGY | Facility: CLINIC | Age: 70
End: 2020-06-03
Payer: MEDICARE

## 2020-06-03 VITALS
HEART RATE: 66 BPM | DIASTOLIC BLOOD PRESSURE: 77 MMHG | BODY MASS INDEX: 28.32 KG/M2 | SYSTOLIC BLOOD PRESSURE: 120 MMHG | WEIGHT: 150 LBS | RESPIRATION RATE: 17 BRPM | OXYGEN SATURATION: 96 % | HEIGHT: 61 IN

## 2020-06-03 VITALS — SYSTOLIC BLOOD PRESSURE: 110 MMHG | DIASTOLIC BLOOD PRESSURE: 60 MMHG | HEART RATE: 64 BPM

## 2020-06-03 PROCEDURE — 93000 ELECTROCARDIOGRAM COMPLETE: CPT

## 2020-06-03 PROCEDURE — 99215 OFFICE O/P EST HI 40 MIN: CPT

## 2020-06-03 RX ORDER — KRILL/OM-3/DHA/EPA/PHOSPHO/AST 1000-230MG
81 CAPSULE ORAL
Refills: 0 | Status: ACTIVE | COMMUNITY
Start: 2020-06-03

## 2020-06-03 NOTE — REASON FOR VISIT
[Consultation] : a consultation regarding [FreeTextEntry1] : This is a 70-year-old woman in need of a GI workup including upper endoscopy and colonoscopy for abdominal pain weight loss and rectal bleeding. The patient has a history of coronary artery disease and underwent drug-eluting stent to both the circumflex and right coronary arteries in November of 2019. She has normal left ventricular systolic function. She has been stable from a cardiac perspective since that time and has had no events. She offers no complaints of chest discomfort shortness of breath palpitations dizziness or syncope

## 2020-06-03 NOTE — PHYSICAL EXAM
[General Appearance - Well Developed] : well developed [Normal Appearance] : normal appearance [Well Groomed] : well groomed [General Appearance - Well Nourished] : well nourished [No Deformities] : no deformities [General Appearance - In No Acute Distress] : no acute distress [Normal Oral Mucosa] : normal oral mucosa [No Oral Pallor] : no oral pallor [No Oral Cyanosis] : no oral cyanosis [Normal Jugular Venous A Waves Present] : normal jugular venous A waves present [Normal Jugular Venous V Waves Present] : normal jugular venous V waves present [No Jugular Venous Triana A Waves] : no jugular venous triana A waves [Respiration, Rhythm And Depth] : normal respiratory rhythm and effort [Exaggerated Use Of Accessory Muscles For Inspiration] : no accessory muscle use [Auscultation Breath Sounds / Voice Sounds] : lungs were clear to auscultation bilaterally [Abdomen Soft] : soft [Abdomen Mass (___ Cm)] : no abdominal mass palpated [Abdomen Tenderness] : non-tender [Abnormal Walk] : normal gait [Gait - Sufficient For Exercise Testing] : the gait was sufficient for exercise testing [Nail Clubbing] : no clubbing of the fingernails [Cyanosis, Localized] : no localized cyanosis [Skin Color & Pigmentation] : normal skin color and pigmentation [Petechial Hemorrhages (___cm)] : no petechial hemorrhages [] : no rash [Skin Lesions] : no skin lesions [No Venous Stasis] : no venous stasis [No Skin Ulcers] : no skin ulcer [Oriented To Time, Place, And Person] : oriented to person, place, and time [No Xanthoma] : no  xanthoma was observed [No Anxiety] : not feeling anxious [Mood] : the mood was normal [Affect] : the affect was normal [Normal] : normal [5th Left ICS - MCL] : palpated at the 5th LICS in the midclavicular line [Normal Rate] : normal [Rhythm Regular] : regular [Normal S2] : normal S2 [Normal S1] : normal S1 [II] : a grade 2 [No Pitting Edema] : no pitting edema present [No Abnormalities] : the abdominal aorta was not enlarged and no bruit was heard [2+] : left 2+ [S3] : no S3 [S4] : no S4 [Right Carotid Bruit] : no bruit heard over the right carotid [Left Carotid Bruit] : no bruit heard over the left carotid [Right Femoral Bruit] : no bruit heard over the right femoral artery [Left Femoral Bruit] : no bruit heard over the left femoral artery

## 2020-06-03 NOTE — ASSESSMENT
[FreeTextEntry1] : In summary, the patient is a 70-year-old woman who is stable from a cardiac perspective. However she did have interventions with drug-eluting stents 7 months ago.\par \par I have had a long discussion with the patient and with her gastroenterologist.\par \par Given her symptoms and the likelihood of an abnormality to be found on either upper or lower endoscopy, the risk-benefit ratio would favor discontinuation of clopidogrel for the 5 days prior to the procedures. The procedures will be done in one day to try to minimize the time off dual antiplatelet therapy.\par \par The patient understands the risks and benefits of this approach.\par \par There is no cardiac contraindication to the proposed procedures

## 2020-06-15 DIAGNOSIS — R32 UNSPECIFIED URINARY INCONTINENCE: ICD-10-CM

## 2020-06-18 ENCOUNTER — RX RENEWAL (OUTPATIENT)
Age: 70
End: 2020-06-18

## 2020-06-26 ENCOUNTER — APPOINTMENT (OUTPATIENT)
Dept: FAMILY MEDICINE | Facility: CLINIC | Age: 70
End: 2020-06-26
Payer: MEDICARE

## 2020-06-26 VITALS
TEMPERATURE: 97.3 F | OXYGEN SATURATION: 94 % | DIASTOLIC BLOOD PRESSURE: 56 MMHG | BODY MASS INDEX: 27.94 KG/M2 | WEIGHT: 148 LBS | HEIGHT: 61 IN | HEART RATE: 55 BPM | SYSTOLIC BLOOD PRESSURE: 108 MMHG

## 2020-06-26 DIAGNOSIS — R11.2 NAUSEA WITH VOMITING, UNSPECIFIED: ICD-10-CM

## 2020-06-26 PROCEDURE — 99214 OFFICE O/P EST MOD 30 MIN: CPT

## 2020-06-26 NOTE — COUNSELING
[No throw rugs] : No throw rugs [Adequate lighting] : Adequate lighting [Fall prevention counseling provided] : Fall prevention counseling provided [Sleep ___ hours/day] : Sleep [unfilled] hours/day [Behavioral health counseling provided] : Behavioral health counseling provided [Use proper foot wear] : Use proper foot wear [Plan in advance] : Plan in advance [Engage in a relaxing activity] : Engage in a relaxing activity [Good understanding] : Patient has a good understanding of lifestyle changes and steps needed to achieve self management goal [None] : None [AUDIT-C Screening administered and reviewed] : AUDIT-C Screening administered and reviewed

## 2020-06-26 NOTE — HEALTH RISK ASSESSMENT
[] : No [No] : No [No falls in past year] : Patient reported no falls in the past year [0] : 2) Feeling down, depressed, or hopeless: Not at all (0) [Audit-CScore] : 0 [[id number]] : 0

## 2020-06-26 NOTE — ASSESSMENT
[FreeTextEntry1] : Assessment and plan:\par \par 1.  Intermittent small bowel obstruction patient will be going for surgery next week for lysis of adhesions.  Surgery will be performed at [place] and preop blood work will be obtained on Monday.\par \par 2.  Hypertension excellent blood pressure control with diltiazem  mg p.o. daily\par \par 3.  Ischemic heart disease status post stent patient presently on clopidogrel 75 mg p.o. daily, aspirin 81 mg p.o. daily patient will be stopping clopidogrel 5 days prior to surgery.  Presently the patient denies any chest pain or shortness of breath.\par \par 4.  Hyperlipidemia continue lovastatin 20 milligrams p.o. at bedtime.\par \par 5.  I recommend no change in medical management at this time except for holding clopidogrel 5 days prior to surgery.

## 2020-06-26 NOTE — PHYSICAL EXAM
[No Acute Distress] : no acute distress [Well Nourished] : well nourished [Well Developed] : well developed [Normal Voice/Communication] : normal voice/communication [Ill-Appearing] : ill-appearing [No Carotid Bruits] : no carotid bruits [No Abdominal Bruit] : a ~M bruit was not heard ~T in the abdomen [No Varicosities] : no varicosities [Pedal Pulses Present] : the pedal pulses are present [No Edema] : there was no peripheral edema [No Palpable Aorta] : no palpable aorta [No Extremity Clubbing/Cyanosis] : no extremity clubbing/cyanosis [Soft] : abdomen soft [Non-distended] : non-distended [No Masses] : no abdominal mass palpated [Non Tender] : non-tender [Memory Grossly Normal] : memory grossly normal [Normal Affect] : the affect was normal [Speech Grossly Normal] : speech grossly normal [Normal Mood] : the mood was normal [Alert and Oriented x3] : oriented to person, place, and time [Normal] : affect was normal and insight and judgment were intact [Normal Insight/Judgement] : insight and judgment were intact [de-identified] : pale [de-identified] : No guarding or rebound, hyperactive bowel sounds [FreeTextEntry1] : GI [de-identified] : diffuse joint pain without swelling [de-identified] : L/S spasm

## 2020-06-26 NOTE — HISTORY OF PRESENT ILLNESS
[FreeTextEntry1] : Recurrent abdominal pain with nausea vomiting and diarrhea and patient has had a substantial weight loss. [de-identified] : Is a 70-year-old female who presents today for follow-up and disease management patient is still experiencing abdominal pain is intermittent and that is accompanied by nausea vomiting and diarrhea and patient feels extremely bloated.  Patient recently underwent comprehensive work-up and it shows that she is having intermittent small bowel obstruction secondary to adhesions.  Patient is scheduled to have surgery next week.  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

## 2020-06-26 NOTE — REVIEW OF SYSTEMS
[Fatigue] : fatigue [Chest Pain] : no chest pain [Palpitations] : no palpitations [Leg Claudication] : no leg claudication [Lower Ext Edema] : no lower extremity edema [Abdominal Pain] : abdominal pain [Orthopnea] : no orthopnea [Nausea] : nausea [Constipation] : no constipation [Diarrhea] : diarrhea [Vomiting] : vomiting [Melena] : no melena [Heartburn] : no heartburn [Joint Pain] : joint pain [Joint Swelling] : no joint swelling [Joint Stiffness] : joint stiffness [Muscle Weakness] : no muscle weakness [Muscle Pain] : muscle pain [Back Pain] : back pain [Negative] : Heme/Lymph

## 2020-06-30 ENCOUNTER — APPOINTMENT (OUTPATIENT)
Dept: FAMILY MEDICINE | Facility: CLINIC | Age: 70
End: 2020-06-30
Payer: MEDICARE

## 2020-06-30 ENCOUNTER — APPOINTMENT (OUTPATIENT)
Dept: CARDIOLOGY | Facility: CLINIC | Age: 70
End: 2020-06-30
Payer: MEDICARE

## 2020-06-30 ENCOUNTER — NON-APPOINTMENT (OUTPATIENT)
Age: 70
End: 2020-06-30

## 2020-06-30 VITALS — SYSTOLIC BLOOD PRESSURE: 100 MMHG | DIASTOLIC BLOOD PRESSURE: 56 MMHG | HEART RATE: 64 BPM

## 2020-06-30 VITALS
DIASTOLIC BLOOD PRESSURE: 70 MMHG | OXYGEN SATURATION: 97 % | BODY MASS INDEX: 27.56 KG/M2 | HEART RATE: 63 BPM | RESPIRATION RATE: 17 BRPM | TEMPERATURE: 97.6 F | SYSTOLIC BLOOD PRESSURE: 123 MMHG | WEIGHT: 146 LBS | HEIGHT: 61 IN

## 2020-06-30 VITALS — SYSTOLIC BLOOD PRESSURE: 98 MMHG | DIASTOLIC BLOOD PRESSURE: 60 MMHG

## 2020-06-30 VITALS
SYSTOLIC BLOOD PRESSURE: 90 MMHG | HEART RATE: 72 BPM | TEMPERATURE: 97.7 F | BODY MASS INDEX: 27.56 KG/M2 | HEIGHT: 61 IN | DIASTOLIC BLOOD PRESSURE: 52 MMHG | OXYGEN SATURATION: 94 % | WEIGHT: 146 LBS

## 2020-06-30 DIAGNOSIS — Z01.818 ENCOUNTER FOR OTHER PREPROCEDURAL EXAMINATION: ICD-10-CM

## 2020-06-30 DIAGNOSIS — Z01.810 ENCOUNTER FOR PREPROCEDURAL CARDIOVASCULAR EXAMINATION: ICD-10-CM

## 2020-06-30 PROCEDURE — 99214 OFFICE O/P EST MOD 30 MIN: CPT

## 2020-06-30 PROCEDURE — 93000 ELECTROCARDIOGRAM COMPLETE: CPT

## 2020-06-30 NOTE — PHYSICAL EXAM
[General Appearance - Well Developed] : well developed [Normal Appearance] : normal appearance [General Appearance - Well Nourished] : well nourished [Well Groomed] : well groomed [No Deformities] : no deformities [General Appearance - In No Acute Distress] : no acute distress [Normal Oral Mucosa] : normal oral mucosa [Normal Jugular Venous A Waves Present] : normal jugular venous A waves present [No Oral Pallor] : no oral pallor [No Oral Cyanosis] : no oral cyanosis [Normal Jugular Venous V Waves Present] : normal jugular venous V waves present [No Jugular Venous Triana A Waves] : no jugular venous triana A waves [Exaggerated Use Of Accessory Muscles For Inspiration] : no accessory muscle use [Respiration, Rhythm And Depth] : normal respiratory rhythm and effort [Auscultation Breath Sounds / Voice Sounds] : lungs were clear to auscultation bilaterally [Abdomen Soft] : soft [Abdomen Tenderness] : non-tender [Abdomen Mass (___ Cm)] : no abdominal mass palpated [Abnormal Walk] : normal gait [Nail Clubbing] : no clubbing of the fingernails [Gait - Sufficient For Exercise Testing] : the gait was sufficient for exercise testing [Petechial Hemorrhages (___cm)] : no petechial hemorrhages [Cyanosis, Localized] : no localized cyanosis [] : no rash [Skin Color & Pigmentation] : normal skin color and pigmentation [No Venous Stasis] : no venous stasis [No Xanthoma] : no  xanthoma was observed [No Skin Ulcers] : no skin ulcer [Skin Lesions] : no skin lesions [Oriented To Time, Place, And Person] : oriented to person, place, and time [Affect] : the affect was normal [Mood] : the mood was normal [No Anxiety] : not feeling anxious [Normal] : normal [5th Left ICS - MCL] : palpated at the 5th LICS in the midclavicular line [Normal Rate] : normal [Normal S1] : normal S1 [Rhythm Regular] : regular [Normal S2] : normal S2 [II] : a grade 2 [2+] : right 2+ [No Pitting Edema] : no pitting edema present [No Abnormalities] : the abdominal aorta was not enlarged and no bruit was heard [S3] : no S3 [Right Carotid Bruit] : no bruit heard over the right carotid [S4] : no S4 [Right Femoral Bruit] : no bruit heard over the right femoral artery [Left Femoral Bruit] : no bruit heard over the left femoral artery [Left Carotid Bruit] : no bruit heard over the left carotid

## 2020-06-30 NOTE — REASON FOR VISIT
[Consultation] : a consultation regarding [FreeTextEntry1] : Patient returns for clearance for surgery for small bowel obstruction. She is stable from a cardiac perspective. She has had no coronary events since her stents to the circumflex and right coronary artery in November 2019. Echocardiography in April of this year reveals normal left ventricular systolic function and no evidence of significant valvular stenosis or insufficiency. She tells me that she has been off Plavix since June 26 but has continued her aspirin.

## 2020-06-30 NOTE — ASSESSMENT
[High Risk Surgery - Intraperitoneal, Intrathoracic or Supringuinal Vascular Procedures] : High Risk Surgery - Intraperitoneal, Intrathoracic or Supringuinal Vascular Procedures - Yes (1) [Ischemic Heart Disease] : Ischemic Heart Disease  - Yes (1) [Congestive Heart Failure] : Congestive Heart Failure - No (0) [Prior Cerebrovascular Accident or TIA] : Prior Cerebrovascular Accident or TIA - No (0) [Creatinine >= 2mg/dL (1 Point)] : Creatinine >= 2mg/dL - No (0) [Insulin-dependent Diabetic (1 Point)] : Insulin-dependent Diabetic - No (0) [2] : 2 , RCRI Class: III, Risk of Post-Op Cardiac Complications: 10.1%, 95% CI for Risk Estimate: 8.1% - 12.6% [Patient Optimized for Surgery] : Patient optimized for surgery [No Further Testing Recommended] : no further testing recommended [Modify anti-platelet treatment prior to procedure] : Modify anti-platelet treatment prior to procedure [Modify medications prior to procedure] : Modify medications prior to procedure [FreeTextEntry5] : NA [FreeTextEntry6] : Plavix has been on hold since the 26th. Was informed to continue aspirin per cardiologist  [FreeTextEntry7] : Will hold all am medications except for Synthroid.

## 2020-06-30 NOTE — RESULTS/DATA
[] : results reviewed [de-identified] : Elevated WBC - likely from SBO. NO signs of active infection noted. [de-identified] : Acceptable  [de-identified] : Acceptable [de-identified] : Acceptable. No acute changes.  [de-identified] : Acceptable

## 2020-06-30 NOTE — PHYSICAL EXAM
[Well Nourished] : well nourished [No Acute Distress] : no acute distress [Well Developed] : well developed [Well-Appearing] : well-appearing [Normal Sclera/Conjunctiva] : normal sclera/conjunctiva [EOMI] : extraocular movements intact [PERRL] : pupils equal round and reactive to light [Normal Oropharynx] : the oropharynx was normal [Normal Outer Ear/Nose] : the outer ears and nose were normal in appearance [No JVD] : no jugular venous distention [No Lymphadenopathy] : no lymphadenopathy [Thyroid Normal, No Nodules] : the thyroid was normal and there were no nodules present [Supple] : supple [No Accessory Muscle Use] : no accessory muscle use [No Respiratory Distress] : no respiratory distress  [Clear to Auscultation] : lungs were clear to auscultation bilaterally [Regular Rhythm] : with a regular rhythm [Normal Rate] : normal rate  [No Murmur] : no murmur heard [Normal S1, S2] : normal S1 and S2 [No Varicosities] : no varicosities [No Abdominal Bruit] : a ~M bruit was not heard ~T in the abdomen [No Carotid Bruits] : no carotid bruits [Pedal Pulses Present] : the pedal pulses are present [No Edema] : there was no peripheral edema [No Palpable Aorta] : no palpable aorta [No Extremity Clubbing/Cyanosis] : no extremity clubbing/cyanosis [Soft] : abdomen soft [Non Tender] : non-tender [No Masses] : no abdominal mass palpated [Non-distended] : non-distended [No HSM] : no HSM [Normal Bowel Sounds] : normal bowel sounds [Normal Posterior Cervical Nodes] : no posterior cervical lymphadenopathy [Normal Anterior Cervical Nodes] : no anterior cervical lymphadenopathy [No CVA Tenderness] : no CVA  tenderness [No Spinal Tenderness] : no spinal tenderness [No Joint Swelling] : no joint swelling [Grossly Normal Strength/Tone] : grossly normal strength/tone [No Rash] : no rash [Coordination Grossly Intact] : coordination grossly intact [No Focal Deficits] : no focal deficits [Normal Gait] : normal gait [Deep Tendon Reflexes (DTR)] : deep tendon reflexes were 2+ and symmetric [Normal Affect] : the affect was normal [Normal Insight/Judgement] : insight and judgment were intact

## 2020-06-30 NOTE — ASSESSMENT
[FreeTextEntry1] : In summary, the patient is a 70-year-old woman with hyperlipidemia hypertension and coronary disease.\par \par In the absence of any recent coronary event there is no cardiac contraindication to the proposed procedure\par \par She is aware that she is at somewhat increased risk for coronary events off Plavix. She is to continue aspirin

## 2020-06-30 NOTE — PLAN
[FreeTextEntry1] : Patient who is a RCRI class 3  presents to be cleared for an ex-lap, ELVIE and possible small bowel resection. Patient with significant cardiac history was cleared by her cardiologist and is asymptomatic today. Patients WBC is elevated at PST but likely from persistent SBO that patient has been suffering for longer than a month. No other infectious symptoms noted. Patient aware of the significant risk related to abdominal surgery and is aware she is at increased risk due to her CAD history. Patient is medically cleared for surgery.

## 2020-06-30 NOTE — HISTORY OF PRESENT ILLNESS
[Coronary Artery Disease] : coronary artery disease [(Patient denies any chest pain, claudication, dyspnea on exertion, orthopnea, palpitations or syncope)] : Patient denies any chest pain, claudication, dyspnea on exertion, orthopnea, palpitations or syncope [Aortic Stenosis] : no aortic stenosis [Atrial Fibrillation] : no atrial fibrillation [Recent Myocardial Infarction] : no recent myocardial infarction [Implantable Device/Pacemaker] : no implantable device/pacemaker [Asthma] : no asthma [COPD] : no COPD [Sleep Apnea] : no sleep apnea [Smoker] : not a smoker [Family Member] : no family member with adverse anesthesia reaction/sudden death [Self] : no previous adverse anesthesia reaction [Chronic Anticoagulation] : no chronic anticoagulation [Chronic Kidney Disease] : no chronic kidney disease [Diabetes] : no diabetes [FreeTextEntry1] : Diagnostic Laparoscopy vs laparotomy, possible ELVIE, Possible small bowel resection [FreeTextEntry2] : 7/1/2020 [FreeTextEntry3] : Dr. Medrano [FreeTextEntry4] : Patient has been suffering from intermittent SBOs and has been having intermittent episodes of abdominal pain, distention and diarrhea over the past month. She has Hx of abdominal hernia repair in 11/2019 . She denies any fever, chills, sob, cough, or luts. Today she feels well with minimal abdominal pain. Last BM was yesterday.\par \par She has a significant PMH of degenerative disc disease, hypertension, obesity and CAD s/p JAC to her circumflex and RCA on 11/2019. NO cardiovascular events since. She has been evaluated by her cardiologist Dr. Pacheco who cleared patient from a cardiovascular perspective. She stopped her Plavix on the 26th of June and was told to continue aspirin

## 2020-07-24 ENCOUNTER — APPOINTMENT (OUTPATIENT)
Dept: FAMILY MEDICINE | Facility: CLINIC | Age: 70
End: 2020-07-24

## 2020-07-29 ENCOUNTER — APPOINTMENT (OUTPATIENT)
Dept: ORTHOPEDIC SURGERY | Facility: CLINIC | Age: 70
End: 2020-07-29

## 2020-07-31 ENCOUNTER — APPOINTMENT (OUTPATIENT)
Dept: FAMILY MEDICINE | Facility: CLINIC | Age: 70
End: 2020-07-31
Payer: MEDICARE

## 2020-07-31 VITALS
TEMPERATURE: 97.9 F | RESPIRATION RATE: 14 BRPM | HEART RATE: 76 BPM | DIASTOLIC BLOOD PRESSURE: 65 MMHG | BODY MASS INDEX: 26.26 KG/M2 | SYSTOLIC BLOOD PRESSURE: 100 MMHG | OXYGEN SATURATION: 96 % | HEIGHT: 61 IN | WEIGHT: 139.06 LBS

## 2020-07-31 DIAGNOSIS — K56.50 INTESTINAL ADHESIONS [BANDS], UNSPECIFIED AS TO PARTIAL VERSUS COMPLETE OBSTRUCTION: ICD-10-CM

## 2020-07-31 PROCEDURE — 99214 OFFICE O/P EST MOD 30 MIN: CPT

## 2020-07-31 NOTE — HISTORY OF PRESENT ILLNESS
[FreeTextEntry1] : See HPI. [de-identified] : Is a 70-year-old female who presents today for follow-up and disease management.  Patient recently had exploratory laparoscopy which required resection of a small section of small bowel secondary to bowel obstruction it was done at [facility] patient was admitted on July 1 and discharged on July 7.  Patient states that she is feeling much better unfortunately had some unwanted weight loss.\par \par Presently the patient is awake alert and oriented x3 in no acute distress she is calm cooperative well-groomed and nourished I reviewed discharge papers, discharge medications which remain the same patient will be seen in follow-up by surgery.\par \par Patient's medical history is significant for abdominal pain secondary to obstruction ischemic heart disease well-controlled recently seen by cardiology, hyperlipidemia history of elevated BMI now weight loss

## 2020-07-31 NOTE — COUNSELING
[Fall prevention counseling provided] : Fall prevention counseling provided [Adequate lighting] : Adequate lighting [Use recommended devices] : Use recommended devices [Use proper foot wear] : Use proper foot wear [No throw rugs] : No throw rugs [Behavioral health counseling provided] : Behavioral health counseling provided [Sleep ___ hours/day] : Sleep [unfilled] hours/day [Plan in advance] : Plan in advance [Engage in a relaxing activity] : Engage in a relaxing activity [None] : None [AUDIT-C Screening administered and reviewed] : AUDIT-C Screening administered and reviewed [Good understanding] : Patient has a good understanding of lifestyle changes and steps needed to achieve self management goal

## 2020-07-31 NOTE — PHYSICAL EXAM
[No Acute Distress] : no acute distress [Well Nourished] : well nourished [Well Developed] : well developed [Ill-Appearing] : ill-appearing [Normal Voice/Communication] : normal voice/communication [No Abdominal Bruit] : a ~M bruit was not heard ~T in the abdomen [No Carotid Bruits] : no carotid bruits [No Varicosities] : no varicosities [No Palpable Aorta] : no palpable aorta [Pedal Pulses Present] : the pedal pulses are present [No Edema] : there was no peripheral edema [No Extremity Clubbing/Cyanosis] : no extremity clubbing/cyanosis [Soft] : abdomen soft [Non-distended] : non-distended [Normal Bowel Sounds] : normal bowel sounds [No Masses] : no abdominal mass palpated [Memory Grossly Normal] : memory grossly normal [Speech Grossly Normal] : speech grossly normal [Normal Affect] : the affect was normal [Alert and Oriented x3] : oriented to person, place, and time [Normal Mood] : the mood was normal [Normal Insight/Judgement] : insight and judgment were intact [de-identified] : pale [Normal] : affect was normal and insight and judgment were intact [de-identified] : No guarding or rebound, periumbilical incision healing well [de-identified] : L/S spasm [de-identified] : diffuse joint pain without swelling [FreeTextEntry1] : GI

## 2020-07-31 NOTE — ASSESSMENT
[FreeTextEntry1] : Assessment and plan:\par \par 1.  Postop 30 days status post small bowel obstruction status post resection of a small section of small bowel patient doing well healing well still having some abdominal pain at surgical incision site.  Physical exam showed no guarding or rebound and normal bowel sounds incision is healing well.\par \par 2.  Ischemic heart disease patient doing well chest pain-free continue aspirin 81 mg, clopidogrel 75 mg.\par \par 3.  Hypertension excellent control with an allopurinol 2.5 mg and diltiazem 240 mg.\par \par 4.  Hyperlipidemia patient is following a low-fat low-cholesterol diet continue lovastatin 20 mg p.o. daily.\par \par 5.  Hypothyroidism continue levothyroxine 125 mcg p.o. daily.\par \par Patient recently had comprehensive blood work drawn with on July 29, 2020 results presently not available.\par \par 6.  Weight loss multifactorial patient will be returning to office in several weeks we will see how she is doing postop it is expected to have a small weight loss.\par \par

## 2020-07-31 NOTE — HEALTH RISK ASSESSMENT
[] : No [No] : In the past 12 months have you used drugs other than those required for medical reasons? No [Audit-CScore] : 0 [0] : 2) Feeling down, depressed, or hopeless: Not at all (0) [[id number]] : 0

## 2020-07-31 NOTE — REVIEW OF SYSTEMS
[Fatigue] : fatigue [Palpitations] : no palpitations [Chest Pain] : no chest pain [Leg Claudication] : no leg claudication [Lower Ext Edema] : no lower extremity edema [Orthopnea] : no orthopnea [Abdominal Pain] : abdominal pain [Constipation] : no constipation [Nausea] : no nausea [Diarrhea] : diarrhea [Vomiting] : no vomiting [Heartburn] : no heartburn [Melena] : no melena [Joint Stiffness] : joint stiffness [Joint Pain] : joint pain [Muscle Weakness] : no muscle weakness [Joint Swelling] : no joint swelling [Back Pain] : back pain [Muscle Pain] : muscle pain [Negative] : Psychiatric

## 2020-08-24 ENCOUNTER — APPOINTMENT (OUTPATIENT)
Dept: FAMILY MEDICINE | Facility: CLINIC | Age: 70
End: 2020-08-24
Payer: MEDICARE

## 2020-08-24 ENCOUNTER — RX RENEWAL (OUTPATIENT)
Age: 70
End: 2020-08-24

## 2020-08-24 VITALS
SYSTOLIC BLOOD PRESSURE: 100 MMHG | HEART RATE: 65 BPM | HEIGHT: 61 IN | OXYGEN SATURATION: 96 % | TEMPERATURE: 96 F | WEIGHT: 142 LBS | DIASTOLIC BLOOD PRESSURE: 60 MMHG | BODY MASS INDEX: 26.81 KG/M2

## 2020-08-24 PROCEDURE — 99213 OFFICE O/P EST LOW 20 MIN: CPT

## 2020-08-24 NOTE — PHYSICAL EXAM
[Well Developed] : well developed [No Acute Distress] : no acute distress [Well Nourished] : well nourished [Normal Voice/Communication] : normal voice/communication [Ill-Appearing] : ill-appearing [No Carotid Bruits] : no carotid bruits [No Abdominal Bruit] : a ~M bruit was not heard ~T in the abdomen [No Varicosities] : no varicosities [No Palpable Aorta] : no palpable aorta [Pedal Pulses Present] : the pedal pulses are present [No Edema] : there was no peripheral edema [Soft] : abdomen soft [No Extremity Clubbing/Cyanosis] : no extremity clubbing/cyanosis [No Masses] : no abdominal mass palpated [Non-distended] : non-distended [Normal Bowel Sounds] : normal bowel sounds [Memory Grossly Normal] : memory grossly normal [Speech Grossly Normal] : speech grossly normal [Normal Mood] : the mood was normal [Alert and Oriented x3] : oriented to person, place, and time [Normal Affect] : the affect was normal [Normal Insight/Judgement] : insight and judgment were intact [Normal] : affect was normal and insight and judgment were intact [de-identified] : No guarding or rebound, periumbilical incision healing well [FreeTextEntry1] : GI [de-identified] : diffuse joint pain without swelling [de-identified] : L/S spasm

## 2020-08-24 NOTE — HEALTH RISK ASSESSMENT
[] : No [No] : In the past 12 months have you used drugs other than those required for medical reasons? No [No falls in past year] : Patient reported no falls in the past year [Audit-CScore] : 0 [0] : 2) Feeling down, depressed, or hopeless: Not at all (0) [[id number]] : 0

## 2020-08-24 NOTE — ASSESSMENT
[FreeTextEntry1] : Assessment and plan:\par \par 1.  Status post follow-up with surgery reviewed note with patient presently being worked up for carcinoid.\par \par 2.  Ischemic heart disease patient doing well chest pain-free continue aspirin 81 mg, clopidogrel 75 mg.\par \par 3.  Hypertension excellent control with an allopurinol 2.5 mg and diltiazem 240 mg.\par \par 4.  Hyperlipidemia patient is following a low-fat low-cholesterol diet continue lovastatin 20 mg p.o. daily.\par \par 5.  Hypothyroidism continue levothyroxine 125 mcg p.o. \par \par 6.  Weight loss improved patient has gained 3 pounds since her last visit.\par \par

## 2020-08-24 NOTE — REVIEW OF SYSTEMS
[Fatigue] : fatigue [Chest Pain] : no chest pain [Palpitations] : no palpitations [Lower Ext Edema] : no lower extremity edema [Leg Claudication] : no leg claudication [Orthopnea] : no orthopnea [Nausea] : no nausea [Abdominal Pain] : no abdominal pain [Constipation] : no constipation [Diarrhea] : diarrhea [Vomiting] : no vomiting [Melena] : no melena [Heartburn] : no heartburn [Joint Swelling] : no joint swelling [Joint Stiffness] : joint stiffness [Joint Pain] : joint pain [Muscle Pain] : muscle pain [Muscle Weakness] : no muscle weakness [Back Pain] : back pain [Negative] : Heme/Lymph

## 2020-08-24 NOTE — COUNSELING
[Fall prevention counseling provided] : Fall prevention counseling provided [Use proper foot wear] : Use proper foot wear [No throw rugs] : No throw rugs [Adequate lighting] : Adequate lighting [Behavioral health counseling provided] : Behavioral health counseling provided [Use recommended devices] : Use recommended devices [Sleep ___ hours/day] : Sleep [unfilled] hours/day [Engage in a relaxing activity] : Engage in a relaxing activity [Plan in advance] : Plan in advance [AUDIT-C Screening administered and reviewed] : AUDIT-C Screening administered and reviewed [None] : None [Good understanding] : Patient has a good understanding of lifestyle changes and steps needed to achieve self management goal

## 2020-08-24 NOTE — HISTORY OF PRESENT ILLNESS
[de-identified] : Is a 70-year-old female who presents today for follow-up and disease management.  Patient recently had exploratory laparoscopy which required resection of a small section of small bowel secondary to bowel obstruction it was done at [facility] patient was admitted on July 1 and discharged on July 7.  Patient states that she is feeling much better unfortunately had some unwanted weight loss.  Patient has followed up with surgery and presently being worked up for carcinoid.  Wound has healed well and patient is feeling much better.\par \par Presently the patient is awake alert and oriented x3 in no acute distress she is calm cooperative well-groomed and nourished I reviewed discharge papers, discharge medications which remain the same, most recent surgical note was reviewed and discussed with patient.\par \par Patient's medical history is significant for abdominal pain secondary to obstruction ischemic heart disease well-controlled recently seen by cardiology, hyperlipidemia history of elevated BMI now weight loss [FreeTextEntry1] : See HPI.

## 2020-09-07 ENCOUNTER — RX RENEWAL (OUTPATIENT)
Age: 70
End: 2020-09-07

## 2020-09-21 ENCOUNTER — APPOINTMENT (OUTPATIENT)
Dept: FAMILY MEDICINE | Facility: CLINIC | Age: 70
End: 2020-09-21
Payer: MEDICARE

## 2020-09-21 VITALS — DIASTOLIC BLOOD PRESSURE: 70 MMHG | SYSTOLIC BLOOD PRESSURE: 130 MMHG

## 2020-09-21 VITALS
TEMPERATURE: 97.8 F | DIASTOLIC BLOOD PRESSURE: 70 MMHG | SYSTOLIC BLOOD PRESSURE: 150 MMHG | WEIGHT: 149 LBS | HEIGHT: 61 IN | BODY MASS INDEX: 28.13 KG/M2

## 2020-09-21 DIAGNOSIS — Z23 ENCOUNTER FOR IMMUNIZATION: ICD-10-CM

## 2020-09-21 PROCEDURE — 99214 OFFICE O/P EST MOD 30 MIN: CPT | Mod: 25

## 2020-09-21 PROCEDURE — 90686 IIV4 VACC NO PRSV 0.5 ML IM: CPT

## 2020-09-21 PROCEDURE — G0008: CPT

## 2020-09-21 NOTE — ASSESSMENT
[FreeTextEntry1] : Assessment and plan:\par \par 1.  Status post follow-up with surgery reviewed note with patient presently being worked up for carcinoid.  Results of 24-hour urine are still pending\par \par 2.  Ischemic heart disease patient doing well chest pain-free continue aspirin 81 mg, clopidogrel 75 mg.\par \par 3.  Hypertension excellent control with an allopurinol 2.5 mg and diltiazem 240 mg.\par \par 4.  Hyperlipidemia patient is following a low-fat low-cholesterol diet continue lovastatin 20 mg p.o. daily.\par \par 5.  Hypothyroidism continue levothyroxine 125 mcg p.o. \par \par 6.  Weight loss improved patient has gained 7 pounds since her last visit.\par \par 7.  Influenza vaccine administered by examiner at this visit.\par \par

## 2020-09-21 NOTE — PHYSICAL EXAM
[No Acute Distress] : no acute distress [Well Nourished] : well nourished [Well Developed] : well developed [Normal Voice/Communication] : normal voice/communication [Ill-Appearing] : ill-appearing [No Carotid Bruits] : no carotid bruits [No Abdominal Bruit] : a ~M bruit was not heard ~T in the abdomen [No Varicosities] : no varicosities [Pedal Pulses Present] : the pedal pulses are present [No Edema] : there was no peripheral edema [No Palpable Aorta] : no palpable aorta [No Extremity Clubbing/Cyanosis] : no extremity clubbing/cyanosis [Soft] : abdomen soft [Non-distended] : non-distended [No Masses] : no abdominal mass palpated [Normal Bowel Sounds] : normal bowel sounds [Speech Grossly Normal] : speech grossly normal [Memory Grossly Normal] : memory grossly normal [Normal Affect] : the affect was normal [Alert and Oriented x3] : oriented to person, place, and time [Normal Mood] : the mood was normal [Normal Insight/Judgement] : insight and judgment were intact [Normal] : affect was normal and insight and judgment were intact [de-identified] : No guarding or rebound, periumbilical incision healing well [FreeTextEntry1] : GI [de-identified] : L/S spasm [de-identified] : diffuse joint pain without swelling

## 2020-09-21 NOTE — REVIEW OF SYSTEMS
[Fatigue] : fatigue [Joint Pain] : joint pain [Joint Stiffness] : joint stiffness [Muscle Pain] : muscle pain [Back Pain] : back pain [Negative] : Heme/Lymph [Chest Pain] : no chest pain [Palpitations] : no palpitations [Leg Claudication] : no leg claudication [Lower Ext Edema] : no lower extremity edema [Orthopnea] : no orthopnea [Abdominal Pain] : no abdominal pain [Nausea] : no nausea [Constipation] : no constipation [Diarrhea] : diarrhea [Vomiting] : no vomiting [Heartburn] : no heartburn [Melena] : no melena [Joint Swelling] : no joint swelling [Muscle Weakness] : no muscle weakness

## 2020-10-26 ENCOUNTER — APPOINTMENT (OUTPATIENT)
Dept: FAMILY MEDICINE | Facility: CLINIC | Age: 70
End: 2020-10-26
Payer: MEDICARE

## 2020-10-26 VITALS
OXYGEN SATURATION: 98 % | HEIGHT: 61 IN | DIASTOLIC BLOOD PRESSURE: 70 MMHG | WEIGHT: 150 LBS | BODY MASS INDEX: 28.32 KG/M2 | RESPIRATION RATE: 14 BRPM | HEART RATE: 55 BPM | SYSTOLIC BLOOD PRESSURE: 126 MMHG | TEMPERATURE: 97.8 F

## 2020-10-26 PROCEDURE — 99214 OFFICE O/P EST MOD 30 MIN: CPT | Mod: 25

## 2020-10-26 NOTE — REVIEW OF SYSTEMS
[Joint Pain] : joint pain [Joint Stiffness] : joint stiffness [Muscle Pain] : muscle pain [Back Pain] : back pain [Negative] : Heme/Lymph [Chest Pain] : no chest pain [Palpitations] : no palpitations [Leg Claudication] : no leg claudication [Lower Ext Edema] : no lower extremity edema [Orthopnea] : no orthopnea [Abdominal Pain] : no abdominal pain [Nausea] : no nausea [Constipation] : no constipation [Diarrhea] : diarrhea [Vomiting] : no vomiting [Heartburn] : no heartburn [Melena] : no melena [Joint Swelling] : no joint swelling [Muscle Weakness] : no muscle weakness [FreeTextEntry2] : Patient is feeling much better in fact started working out as she did previously. [FreeTextEntry7] : Occasional rectal bleeding.

## 2020-10-26 NOTE — ASSESSMENT
[FreeTextEntry1] : Assessment and plan:\par \par 1.  Occasional rectal bleeding it appears that carcinoid work-up has been negative it is felt that the patient has occasional rectal bleeding secondary to hemorrhoids and anticoagulation.  Patient will be seeing surgery in the near future.  Patient has follow-up appointment this coming December.\par \par 2.  Ischemic heart disease patient doing well chest pain-free continue aspirin 81 mg, clopidogrel 75 mg.\par \par 3.  Hypertension excellent control with an allopurinol 2.5 mg and diltiazem 240 mg.\par \par 4.  Hyperlipidemia patient is following a low-fat low-cholesterol diet continue lovastatin 20 mg p.o. daily.\par \par 5.  Hypothyroidism continue levothyroxine 125 mcg p.o. \par \par 6.  Weight loss improved patient has gained 10 pounds since her last visit.\par \par \par \par

## 2020-10-26 NOTE — HEALTH RISK ASSESSMENT
[No] : In the past 12 months have you used drugs other than those required for medical reasons? No [No falls in past year] : Patient reported no falls in the past year [0] : 2) Feeling down, depressed, or hopeless: Not at all (0) [] : No [Audit-CScore] : 0 [[id number]] : 0

## 2020-10-26 NOTE — PHYSICAL EXAM
[No Acute Distress] : no acute distress [Well Nourished] : well nourished [Well Developed] : well developed [Normal Voice/Communication] : normal voice/communication [Ill-Appearing] : ill-appearing [No Carotid Bruits] : no carotid bruits [No Abdominal Bruit] : a ~M bruit was not heard ~T in the abdomen [No Varicosities] : no varicosities [Pedal Pulses Present] : the pedal pulses are present [No Edema] : there was no peripheral edema [No Palpable Aorta] : no palpable aorta [No Extremity Clubbing/Cyanosis] : no extremity clubbing/cyanosis [Soft] : abdomen soft [Non-distended] : non-distended [No Masses] : no abdominal mass palpated [Normal Bowel Sounds] : normal bowel sounds [Speech Grossly Normal] : speech grossly normal [Memory Grossly Normal] : memory grossly normal [Normal Affect] : the affect was normal [Alert and Oriented x3] : oriented to person, place, and time [Normal Mood] : the mood was normal [Normal Insight/Judgement] : insight and judgment were intact [Normal] : affect was normal and insight and judgment were intact [de-identified] : No guarding or rebound, periumbilical incision healing well [FreeTextEntry1] : GI [de-identified] : Back pain has resolved lumbosacral spasm resolved patient does much better with working out. [de-identified] : diffuse joint pain without swelling

## 2020-10-26 NOTE — HISTORY OF PRESENT ILLNESS
[FreeTextEntry1] : See HPI. [de-identified] : Is a 70-year-old female who presents today for follow-up and disease management.  Patient recently had exploratory laparoscopy which required resection of a small section of small bowel secondary to bowel obstruction it was done at [facility] patient was admitted on July 1 and discharged on July 7.  Patient states that she is feeling much better unfortunately had some unwanted weight loss which has resolved in fact since then patient has gained 10 pounds.  Patient has followed up with surgery and still has occasional rectal bleeding it appears that carcinoid work-up was negative and the rectal bleeding is caused by anticoagulation patient also has history of hemorrhoids patient has follow-up appointment with surgery.\par \par Presently the patient is awake alert and oriented x3 in no acute distress she is calm cooperative well-groomed and nourished I reviewed discharge papers, discharge medications which remain the same, most recent surgical note was reviewed and discussed with patient.\par \par Patient's medical history is significant for abdominal pain secondary to obstruction ischemic heart disease well-controlled recently seen by cardiology, hyperlipidemia history of elevated BMI, weight loss has resolved patient has regained 10 pounds.

## 2020-11-18 ENCOUNTER — OUTPATIENT (OUTPATIENT)
Dept: OUTPATIENT SERVICES | Facility: HOSPITAL | Age: 70
LOS: 1 days | End: 2020-11-18
Payer: MEDICARE

## 2020-11-18 ENCOUNTER — APPOINTMENT (OUTPATIENT)
Dept: MRI IMAGING | Facility: HOSPITAL | Age: 70
End: 2020-11-18
Payer: MEDICARE

## 2020-11-18 DIAGNOSIS — Z98.890 OTHER SPECIFIED POSTPROCEDURAL STATES: Chronic | ICD-10-CM

## 2020-11-18 DIAGNOSIS — Z98.89 OTHER SPECIFIED POSTPROCEDURAL STATES: Chronic | ICD-10-CM

## 2020-11-18 DIAGNOSIS — Z96.653 PRESENCE OF ARTIFICIAL KNEE JOINT, BILATERAL: Chronic | ICD-10-CM

## 2020-11-18 DIAGNOSIS — Z00.8 ENCOUNTER FOR OTHER GENERAL EXAMINATION: ICD-10-CM

## 2020-11-18 DIAGNOSIS — K46.9 UNSPECIFIED ABDOMINAL HERNIA WITHOUT OBSTRUCTION OR GANGRENE: Chronic | ICD-10-CM

## 2020-11-18 PROCEDURE — 72148 MRI LUMBAR SPINE W/O DYE: CPT | Mod: 26

## 2020-11-18 PROCEDURE — 72148 MRI LUMBAR SPINE W/O DYE: CPT

## 2020-11-25 ENCOUNTER — NON-APPOINTMENT (OUTPATIENT)
Age: 70
End: 2020-11-25

## 2020-11-25 ENCOUNTER — APPOINTMENT (OUTPATIENT)
Dept: CARDIOLOGY | Facility: CLINIC | Age: 70
End: 2020-11-25
Payer: MEDICARE

## 2020-11-25 VITALS
SYSTOLIC BLOOD PRESSURE: 102 MMHG | HEART RATE: 63 BPM | OXYGEN SATURATION: 97 % | DIASTOLIC BLOOD PRESSURE: 64 MMHG | RESPIRATION RATE: 16 BRPM | HEIGHT: 61 IN

## 2020-11-25 VITALS — SYSTOLIC BLOOD PRESSURE: 90 MMHG | DIASTOLIC BLOOD PRESSURE: 54 MMHG

## 2020-11-25 PROCEDURE — 99214 OFFICE O/P EST MOD 30 MIN: CPT

## 2020-11-25 PROCEDURE — 93000 ELECTROCARDIOGRAM COMPLETE: CPT

## 2020-11-25 NOTE — ASSESSMENT
[FreeTextEntry1] : Impression:\par 1. Patient with atypical chest pain most probably noncardiac in nature however the patient does have coronary disease\par \par Plan:\par 1. Vasodilator myocardial perfusion imaging to assess for ischemia as a cause of the patient's symptoms

## 2020-11-25 NOTE — PHYSICAL EXAM
[General Appearance - Well Developed] : well developed [Normal Appearance] : normal appearance [Well Groomed] : well groomed [General Appearance - Well Nourished] : well nourished [No Deformities] : no deformities [General Appearance - In No Acute Distress] : no acute distress [Normal Oral Mucosa] : normal oral mucosa [No Oral Pallor] : no oral pallor [No Oral Cyanosis] : no oral cyanosis [Normal Jugular Venous A Waves Present] : normal jugular venous A waves present [Normal Jugular Venous V Waves Present] : normal jugular venous V waves present [No Jugular Venous Triana A Waves] : no jugular venous triana A waves [Respiration, Rhythm And Depth] : normal respiratory rhythm and effort [Exaggerated Use Of Accessory Muscles For Inspiration] : no accessory muscle use [Auscultation Breath Sounds / Voice Sounds] : lungs were clear to auscultation bilaterally [Abdomen Soft] : soft [Abdomen Tenderness] : non-tender [Abdomen Mass (___ Cm)] : no abdominal mass palpated [Abnormal Walk] : normal gait [Gait - Sufficient For Exercise Testing] : the gait was sufficient for exercise testing [Nail Clubbing] : no clubbing of the fingernails [Cyanosis, Localized] : no localized cyanosis [Petechial Hemorrhages (___cm)] : no petechial hemorrhages [Skin Color & Pigmentation] : normal skin color and pigmentation [] : no rash [No Venous Stasis] : no venous stasis [Skin Lesions] : no skin lesions [No Skin Ulcers] : no skin ulcer [No Xanthoma] : no  xanthoma was observed [Oriented To Time, Place, And Person] : oriented to person, place, and time [Affect] : the affect was normal [Mood] : the mood was normal [No Anxiety] : not feeling anxious [5th Left ICS - MCL] : palpated at the 5th LICS in the midclavicular line [Normal] : normal [Normal Rate] : normal [Rhythm Regular] : regular [Normal S1] : normal S1 [Normal S2] : normal S2 [S3] : no S3 [S4] : no S4 [II] : a grade 2 [Right Carotid Bruit] : no bruit heard over the right carotid [Left Carotid Bruit] : no bruit heard over the left carotid [Right Femoral Bruit] : no bruit heard over the right femoral artery [Left Femoral Bruit] : no bruit heard over the left femoral artery [2+] : left 2+ [No Abnormalities] : the abdominal aorta was not enlarged and no bruit was heard [No Pitting Edema] : no pitting edema present

## 2020-11-25 NOTE — REASON FOR VISIT
[Acute Exacerbation] : an acute exacerbation of [Chest Pain] : chest pain [FreeTextEntry1] : Patient returns for reevaluation. She states in the past week she has had an occasional left-sided chest pain. She seems to think that it is similar to that which she had prior to his stent. She states it is not exertion related it does not radiate to the neck back or arm. It is not associated with diaphoresis or shortness of breath. She states she can get it just sitting and doing nothing. She states it lasts anywhere from an hour to an hour and a half.

## 2020-12-08 ENCOUNTER — APPOINTMENT (OUTPATIENT)
Dept: FAMILY MEDICINE | Facility: CLINIC | Age: 70
End: 2020-12-08
Payer: MEDICARE

## 2020-12-08 ENCOUNTER — LABORATORY RESULT (OUTPATIENT)
Age: 70
End: 2020-12-08

## 2020-12-08 VITALS
RESPIRATION RATE: 16 BRPM | SYSTOLIC BLOOD PRESSURE: 134 MMHG | HEIGHT: 61 IN | OXYGEN SATURATION: 97 % | DIASTOLIC BLOOD PRESSURE: 62 MMHG | HEART RATE: 65 BPM | WEIGHT: 156 LBS | BODY MASS INDEX: 29.45 KG/M2 | TEMPERATURE: 97.1 F

## 2020-12-08 PROCEDURE — 99214 OFFICE O/P EST MOD 30 MIN: CPT | Mod: 25

## 2020-12-08 PROCEDURE — 36415 COLL VENOUS BLD VENIPUNCTURE: CPT

## 2020-12-08 NOTE — HEALTH RISK ASSESSMENT
[] : No [No] : In the past 12 months have you used drugs other than those required for medical reasons? No [No falls in past year] : Patient reported no falls in the past year [0] : 2) Feeling down, depressed, or hopeless: Not at all (0) [Audit-CScore] : 0 [[id number]] : 0

## 2020-12-08 NOTE — ASSESSMENT
[FreeTextEntry1] : Assessment and plan:\par \par 1.  Rectal bleeding persists follow-up with gastroenterology scheduled for colonoscopy and capsule endoscopy.\par \par 2.  Ischemic heart disease patient doing well chest pain-free continue aspirin 81 mg, clopidogrel 75 mg.\par \par 3.  Hypertension excellent control with an allopurinol 2.5 mg and diltiazem 240 mg.\par \par 4.  Hyperlipidemia patient is following a low-fat low-cholesterol diet continue lovastatin 20 mg p.o. daily.\par \par 5.  Hypothyroidism continue levothyroxine 125 mcg p.o. \par \par 6.  Weight loss improved patient has gained 6 pounds since her last visit.\par \par 7.  Anemia comprehensive blood work drawn in office by examiner I will be checking CBC with differential vitamin B12 and folate levels and of course iron studies.  Patient will continue present iron supplementation.\par \par 8.  Incisional abdominal hernia follow-up with surgery.\par \par \par \par

## 2020-12-08 NOTE — REVIEW OF SYSTEMS
[Fatigue] : fatigue [Chest Pain] : no chest pain [Palpitations] : no palpitations [Leg Claudication] : no leg claudication [Lower Ext Edema] : no lower extremity edema [Orthopnea] : no orthopnea [Abdominal Pain] : abdominal pain [Nausea] : no nausea [Constipation] : no constipation [Diarrhea] : diarrhea [Vomiting] : no vomiting [Heartburn] : no heartburn [Melena] : no melena [Joint Pain] : joint pain [Joint Stiffness] : joint stiffness [Joint Swelling] : no joint swelling [Muscle Weakness] : no muscle weakness [Muscle Pain] : muscle pain [Back Pain] : back pain [Negative] : Heme/Lymph [FreeTextEntry7] :  rectal bleeding.

## 2020-12-08 NOTE — PHYSICAL EXAM
[No Acute Distress] : no acute distress [Well Nourished] : well nourished [Well Developed] : well developed [Normal Voice/Communication] : normal voice/communication [Ill-Appearing] : ill-appearing [Normal Sclera/Conjunctiva] : normal sclera/conjunctiva [PERRL] : pupils equal round and reactive to light [No JVD] : no jugular venous distention [No Lymphadenopathy] : no lymphadenopathy [Supple] : supple [Normal Rate] : normal rate  [Regular Rhythm] : with a regular rhythm [Normal S1, S2] : normal S1 and S2 [No Carotid Bruits] : no carotid bruits [No Abdominal Bruit] : a ~M bruit was not heard ~T in the abdomen [No Varicosities] : no varicosities [Pedal Pulses Present] : the pedal pulses are present [No Edema] : there was no peripheral edema [No Palpable Aorta] : no palpable aorta [No Extremity Clubbing/Cyanosis] : no extremity clubbing/cyanosis [Soft] : abdomen soft [Non-distended] : non-distended [No Masses] : no abdominal mass palpated [Normal Bowel Sounds] : normal bowel sounds [No Joint Swelling] : no joint swelling [Speech Grossly Normal] : speech grossly normal [Memory Grossly Normal] : memory grossly normal [Normal Affect] : the affect was normal [Alert and Oriented x3] : oriented to person, place, and time [Normal Mood] : the mood was normal [Normal Insight/Judgement] : insight and judgment were intact [Normal] : affect was normal and insight and judgment were intact [de-identified] : No guarding or rebound, incisional abdominal hernia [FreeTextEntry1] : GI [de-identified] : Back pain has improved [de-identified] : diffuse joint pain without swelling

## 2020-12-08 NOTE — COUNSELING
[Fall prevention counseling provided] : Fall prevention counseling provided [Adequate lighting] : Adequate lighting [No throw rugs] : No throw rugs [Use proper foot wear] : Use proper foot wear [Use recommended devices] : Use recommended devices [FreeTextEntry1] : cane [Behavioral health counseling provided] : Behavioral health counseling provided [Sleep ___ hours/day] : Sleep [unfilled] hours/day [Engage in a relaxing activity] : Engage in a relaxing activity [Plan in advance] : Plan in advance [AUDIT-C Screening administered and reviewed] : AUDIT-C Screening administered and reviewed [None] : None [Good understanding] : Patient has a good understanding of lifestyle changes and steps needed to achieve self management goal

## 2020-12-08 NOTE — HISTORY OF PRESENT ILLNESS
[FreeTextEntry1] : See HPI [de-identified] : Patient is a 70-year-old female who presents today for follow-up and disease management patient states that the rectal bleeding persists recently seen by gastroenterology has follow-up appointment with gastroenterology for colonoscopy and will also have capsule endoscopy.  We will be addressing ischemic heart disease patient was recently seen by cardiology on November 25, 2020 and is scheduled for a stress test, hypertension, hyperlipidemia, hypothyroidism and it appears that the patient has developed an incisional abdominal hernia status post hernia repair.\par \par Presently the patient is awake alert and oriented x3 in no acute distress with multiple medical problems and will be having follow-up with cardiology gastroenterology and also surgery for the incisional hernia.\par \par Patient is calm and cooperative presently denies any chest pain.

## 2020-12-10 LAB
BASOPHILS # BLD AUTO: 1.46 K/UL
BASOPHILS NFR BLD AUTO: 12.2 %
EOSINOPHIL # BLD AUTO: 0.42 K/UL
EOSINOPHIL NFR BLD AUTO: 3.5 %
ESTIMATED AVERAGE GLUCOSE: 114 MG/DL
FOLATE SERPL-MCNC: >20 NG/ML
HBA1C MFR BLD HPLC: 5.6 %
HCT VFR BLD CALC: 36.3 %
HGB BLD-MCNC: 10.3 G/DL
LYMPHOCYTES # BLD AUTO: 1.87 K/UL
LYMPHOCYTES NFR BLD AUTO: 15.6 %
MAN DIFF?: NORMAL
MCHC RBC-ENTMCNC: 25.4 PG
MCHC RBC-ENTMCNC: 28.4 GM/DL
MCV RBC AUTO: 89.6 FL
MONOCYTES # BLD AUTO: 0.73 K/UL
MONOCYTES NFR BLD AUTO: 6.1 %
NEUTROPHILS # BLD AUTO: 7.5 K/UL
NEUTROPHILS NFR BLD AUTO: 62.6 %
PLATELET # BLD AUTO: 356 K/UL
RBC # BLD: 4.05 M/UL
RBC # FLD: 15.5 %
VIT B12 SERPL-MCNC: 1521 PG/ML
WBC # FLD AUTO: 11.98 K/UL

## 2020-12-11 LAB
ALBUMIN SERPL ELPH-MCNC: 4.3 G/DL
ALP BLD-CCNC: 81 U/L
ALT SERPL-CCNC: 8 U/L
ANION GAP SERPL CALC-SCNC: 15 MMOL/L
AST SERPL-CCNC: 20 U/L
BILIRUB SERPL-MCNC: <0.2 MG/DL
BUN SERPL-MCNC: 23 MG/DL
CALCIUM SERPL-MCNC: 9.5 MG/DL
CHLORIDE SERPL-SCNC: 105 MMOL/L
CHOLEST SERPL-MCNC: 129 MG/DL
CO2 SERPL-SCNC: 22 MMOL/L
CREAT SERPL-MCNC: 1.31 MG/DL
GLUCOSE SERPL-MCNC: 85 MG/DL
HDLC SERPL-MCNC: 55 MG/DL
IRON SATN MFR SERPL: 33 %
IRON SERPL-MCNC: 111 UG/DL
LDLC SERPL CALC-MCNC: 62 MG/DL
NONHDLC SERPL-MCNC: 75 MG/DL
POTASSIUM SERPL-SCNC: 4.5 MMOL/L
PROT SERPL-MCNC: 6.9 G/DL
SODIUM SERPL-SCNC: 142 MMOL/L
TIBC SERPL-MCNC: 340 UG/DL
TRIGL SERPL-MCNC: 61 MG/DL
UIBC SERPL-MCNC: 228 UG/DL
URATE SERPL-MCNC: 4.4 MG/DL

## 2020-12-15 PROBLEM — Z12.11 ENCOUNTER FOR SCREENING COLONOSCOPY: Status: RESOLVED | Noted: 2017-03-22 | Resolved: 2020-12-15

## 2020-12-15 PROBLEM — Z01.419 ENCOUNTER FOR ANNUAL ROUTINE GYNECOLOGICAL EXAMINATION: Status: RESOLVED | Noted: 2017-03-09 | Resolved: 2020-12-15

## 2020-12-16 ENCOUNTER — APPOINTMENT (OUTPATIENT)
Dept: CARDIOLOGY | Facility: CLINIC | Age: 70
End: 2020-12-16
Payer: MEDICARE

## 2020-12-16 PROCEDURE — 93015 CV STRESS TEST SUPVJ I&R: CPT

## 2020-12-16 PROCEDURE — A9500: CPT

## 2020-12-16 PROCEDURE — 78452 HT MUSCLE IMAGE SPECT MULT: CPT

## 2020-12-31 NOTE — PATIENT PROFILE ADULT - NSTRANSFEREYEGLASSESPAIRS_GEN_A_NUR
Christian Soloiro calling from [address] [place] [address]  Needs to speak with in site office of Dr. Severo Fruit concerning items that had been faxed to [place] [address]     Warm transfer to [address] [facility] at [place] [address]
1 pair

## 2021-01-06 NOTE — ASU PREOP CHECKLIST - NS PREOP CHK CHLOROHEX WASH
[address] [facility] Internal Medicine Progress Note    CC:  Patient presents with:  Thyroid Problem: She has concerns of thyroid and hormone issues- she has had weight gain.   Immunization/Injection: refused flu shot      HPI:   HPI  Has gained almost 25lb /78   Pulse 74   Temp 98.6 °F (37 °C) (Temporal)   Resp 16   Ht 5' 10\" (1.778 m)   Wt 187 lb (84.8 kg)   LMP 12/03/2020 (Exact Date)   SpO2 99%   BMI 26.83 kg/m²  Body mass index is 26.83 kg/m².   Physical Exam   Constitutional: She is orient in ASU:

## 2021-01-31 ENCOUNTER — RX RENEWAL (OUTPATIENT)
Age: 71
End: 2021-01-31

## 2021-02-01 ENCOUNTER — RX RENEWAL (OUTPATIENT)
Age: 71
End: 2021-02-01

## 2021-02-23 ENCOUNTER — RX RENEWAL (OUTPATIENT)
Age: 71
End: 2021-02-23

## 2021-03-09 ENCOUNTER — LABORATORY RESULT (OUTPATIENT)
Age: 71
End: 2021-03-09

## 2021-03-09 ENCOUNTER — APPOINTMENT (OUTPATIENT)
Dept: FAMILY MEDICINE | Facility: CLINIC | Age: 71
End: 2021-03-09
Payer: MEDICARE

## 2021-03-09 VITALS
SYSTOLIC BLOOD PRESSURE: 136 MMHG | DIASTOLIC BLOOD PRESSURE: 86 MMHG | RESPIRATION RATE: 16 BRPM | TEMPERATURE: 97.6 F | WEIGHT: 154 LBS | HEIGHT: 60 IN | HEART RATE: 59 BPM | BODY MASS INDEX: 30.23 KG/M2 | OXYGEN SATURATION: 96 %

## 2021-03-09 DIAGNOSIS — D64.9 ANEMIA, UNSPECIFIED: ICD-10-CM

## 2021-03-09 PROCEDURE — 99214 OFFICE O/P EST MOD 30 MIN: CPT | Mod: 25

## 2021-03-09 PROCEDURE — 36415 COLL VENOUS BLD VENIPUNCTURE: CPT

## 2021-03-09 NOTE — REVIEW OF SYSTEMS
[Fatigue] : fatigue [Abdominal Pain] : abdominal pain [Joint Pain] : joint pain [Joint Stiffness] : joint stiffness [Muscle Pain] : muscle pain [Back Pain] : back pain [Negative] : Heme/Lymph [Chest Pain] : no chest pain [Palpitations] : no palpitations [Leg Claudication] : no leg claudication [Lower Ext Edema] : no lower extremity edema [Orthopnea] : no orthopnea [Nausea] : no nausea [Constipation] : no constipation [Diarrhea] : diarrhea [Vomiting] : no vomiting [Heartburn] : no heartburn [Melena] : no melena [Joint Swelling] : no joint swelling [Muscle Weakness] : no muscle weakness [FreeTextEntry7] :  rectal bleeding.

## 2021-03-09 NOTE — PHYSICAL EXAM
[Well Nourished] : well nourished [Well Developed] : well developed [Normal Voice/Communication] : normal voice/communication [Ill-Appearing] : ill-appearing [Normal Sclera/Conjunctiva] : normal sclera/conjunctiva [PERRL] : pupils equal round and reactive to light [No JVD] : no jugular venous distention [No Lymphadenopathy] : no lymphadenopathy [Supple] : supple [Normal Rate] : normal rate  [Regular Rhythm] : with a regular rhythm [Normal S1, S2] : normal S1 and S2 [No Carotid Bruits] : no carotid bruits [No Abdominal Bruit] : a ~M bruit was not heard ~T in the abdomen [No Varicosities] : no varicosities [Pedal Pulses Present] : the pedal pulses are present [No Edema] : there was no peripheral edema [No Palpable Aorta] : no palpable aorta [No Extremity Clubbing/Cyanosis] : no extremity clubbing/cyanosis [Soft] : abdomen soft [Non-distended] : non-distended [No Masses] : no abdominal mass palpated [Normal Bowel Sounds] : normal bowel sounds [No Joint Swelling] : no joint swelling [Speech Grossly Normal] : speech grossly normal [Memory Grossly Normal] : memory grossly normal [Normal Affect] : the affect was normal [Alert and Oriented x3] : oriented to person, place, and time [Normal Mood] : the mood was normal [Normal Insight/Judgement] : insight and judgment were intact [Normal] : affect was normal and insight and judgment were intact [de-identified] : No guarding or rebound, incisional abdominal hernia has increased in size [FreeTextEntry1] : GI, colonoscopy and capsule endoscopy recently done [de-identified] : Back pain has improved [de-identified] : diffuse joint pain without swelling

## 2021-03-09 NOTE — HEALTH RISK ASSESSMENT
[No] : In the past 12 months have you used drugs other than those required for medical reasons? No [No falls in past year] : Patient reported no falls in the past year [0] : 2) Feeling down, depressed, or hopeless: Not at all (0) [] : No [Audit-CScore] : 0 [[id number]] : 0

## 2021-03-09 NOTE — HISTORY OF PRESENT ILLNESS
[FreeTextEntry1] : See HPI [de-identified] : Patient is a 70-year-old female who presents today for follow-up and disease management patient recently was seen by surgery Dr. Medrano regarding abdominal incisional hernia presently he recommended an abdominal binder due to the fact the patient still has recurrent rectal bleeding and anemia.  Work-up for the rectal bleeding and anemia are still in progress by gastroenterology recently patient had colonoscopy and most recently results are still not present capsule endoscopy.  Medical history is also significant for ischemic heart disease, hypertension, hyperlipidemia, hypothyroidism and history of weight loss which has stabilized.\par \par Presently the patient is awake alert and oriented x3 she is in no acute distress she is calm and cooperative does admit to abdominal discomfort and patient continues to have rectal bleeding and as stated earlier work-up is presently in progress.

## 2021-03-09 NOTE — ASSESSMENT
[FreeTextEntry1] : Assessment and plan:\par \par 1.  Rectal bleeding persists follow-up with gastroenterology, recently had colonoscopy and capsule endoscopy.  The results of the capsule endoscopy are still pending yet the patient is still having breakthrough rectal bleeding.\par \par 2.  Ischemic heart disease patient doing well chest pain-free continue aspirin 81 mg, clopidogrel 75 mg.  Patient did stop aspirin and clopidogrel prior to procedures patient still has occasional rectal bleeding we may need to stop anticoagulation totally.  Patient did have stents placed approximately a year ago so if asymptomatic she could possibly stop the clopidogrel we will discuss with cardiology.\par \par 3.  Hypertension excellent control with an allopurinol 2.5 mg and diltiazem 240 mg.\par \par 4.  Hyperlipidemia patient is following a low-fat low-cholesterol diet continue lovastatin 20 mg p.o. daily.\par \par 5.  Hypothyroidism continue levothyroxine 125 mcg p.o. \par \par 6.  Weight loss improved, patient has stabilized.\par \par 7.  Anemia comprehensive blood work drawn in office by examiner I will be checking CBC with differential vitamin B12 and folate levels and of course iron studies.  Patient will continue present iron supplementation.\par \par 8.  Incisional abdominal hernia has been increasing in size patient cannot have surgery until source of rectal bleeding is found.\par \par \par \par

## 2021-03-09 NOTE — CURRENT MEDS
[Takes medication as prescribed] : takes [None] : Patient does not have any barriers to medication adherence [Yes] : Reviewed medication list for presence of high-risk medications. [Benzodiazepines] : benzodiazepines [Opioids] : opioids [Sedatives] : sedatives

## 2021-03-10 LAB
BASOPHILS # BLD AUTO: 2.25 K/UL
BASOPHILS NFR BLD AUTO: 12.1 %
EOSINOPHIL # BLD AUTO: 1.12 K/UL
EOSINOPHIL NFR BLD AUTO: 6 %
FERRITIN SERPL-MCNC: 90 NG/ML
FOLATE SERPL-MCNC: 16.7 NG/ML
HCT VFR BLD CALC: 36.9 %
HGB BLD-MCNC: 10.7 G/DL
IRON SATN MFR SERPL: 29 %
IRON SERPL-MCNC: 69 UG/DL
LYMPHOCYTES # BLD AUTO: 1.77 K/UL
LYMPHOCYTES NFR BLD AUTO: 9.5 %
MAN DIFF?: NORMAL
MCHC RBC-ENTMCNC: 25.7 PG
MCHC RBC-ENTMCNC: 29 GM/DL
MCV RBC AUTO: 88.5 FL
MONOCYTES # BLD AUTO: 1.45 K/UL
MONOCYTES NFR BLD AUTO: 7.8 %
NEUTROPHILS # BLD AUTO: 10.09 K/UL
NEUTROPHILS NFR BLD AUTO: 54.3 %
PLATELET # BLD AUTO: 400 K/UL
RBC # BLD: 4.17 M/UL
RBC # FLD: 16.9 %
TIBC SERPL-MCNC: 241 UG/DL
UIBC SERPL-MCNC: 172 UG/DL
VIT B12 SERPL-MCNC: 1457 PG/ML
WBC # FLD AUTO: 18.59 K/UL

## 2021-03-14 LAB
APPEARANCE: CLEAR
BILIRUBIN URINE: NEGATIVE
BLOOD URINE: NEGATIVE
COLOR: YELLOW
GLUCOSE QUALITATIVE U: NEGATIVE
KETONES URINE: NEGATIVE
LEUKOCYTE ESTERASE URINE: NEGATIVE
NITRITE URINE: NEGATIVE
PH URINE: 5.5
PROTEIN URINE: NEGATIVE
SPECIFIC GRAVITY URINE: 1.02
UROBILINOGEN URINE: NORMAL

## 2021-04-20 NOTE — PHYSICAL EXAM
Problem: Communication  Goal: The ability to communicate needs accurately and effectively will improve  Outcome: PROGRESSING AS EXPECTED     Problem: Safety  Goal: Will remain free from injury  Outcome: PROGRESSING AS EXPECTED     Problem: Knowledge Deficit  Goal: Knowledge of disease process/condition, treatment plan, diagnostic tests, and medications will improve  Outcome: PROGRESSING AS EXPECTED     Problem: Respiratory:  Goal: Respiratory status will improve  Outcome: PROGRESSING AS EXPECTED     Problem: Urinary Elimination:  Goal: Ability to reestablish a normal urinary elimination pattern will improve  Outcome: PROGRESSING AS EXPECTED     Problem: Pain Management  Goal: Pain level will decrease to patient's comfort goal  Outcome: PROGRESSING SLOWER THAN EXPECTED      [FreeTextEntry1] : General: NAD, alert\par Psych: normal mood and affect\par HEENT: NC/AT, normal visual tracking\par Pulmonary: no resp distress, chest expansion appears symmetrical\par CV: extremities are warm and perfused\par Abd: non-distended\par Ext: no c/c/e\par \par Lumbar/Hip Spine:\par Inspection: normal muscle bulk without asymmetry\par Tenderness to palpation: swelling over the right lateral abdomen with tenderness, mild TTP over the right upper lumbar paraspinals, low thoracic paraspinals, no TTP over the PSIS, greater trochanter, sacroiliac joints,  piriformis\par ROM: within functional limits and without pain\par MMT: 5/5 bilateral lower extremities\par Reflexes: depressed right patella reflex, otherwise symmetric bilateral achilles\par Sensory: intact to light touch in all dermatomes of the bilateral lower extremities.

## 2021-05-05 ENCOUNTER — NON-APPOINTMENT (OUTPATIENT)
Age: 71
End: 2021-05-05

## 2021-05-05 ENCOUNTER — APPOINTMENT (OUTPATIENT)
Dept: CARDIOLOGY | Facility: CLINIC | Age: 71
End: 2021-05-05
Payer: MEDICARE

## 2021-05-05 VITALS
BODY MASS INDEX: 30.43 KG/M2 | RESPIRATION RATE: 20 BRPM | DIASTOLIC BLOOD PRESSURE: 87 MMHG | HEIGHT: 60 IN | SYSTOLIC BLOOD PRESSURE: 145 MMHG | OXYGEN SATURATION: 96 % | TEMPERATURE: 97.6 F | HEART RATE: 66 BPM | WEIGHT: 155 LBS

## 2021-05-05 VITALS — SYSTOLIC BLOOD PRESSURE: 140 MMHG | HEART RATE: 60 BPM | DIASTOLIC BLOOD PRESSURE: 72 MMHG

## 2021-05-05 PROCEDURE — 99214 OFFICE O/P EST MOD 30 MIN: CPT

## 2021-05-05 PROCEDURE — 93000 ELECTROCARDIOGRAM COMPLETE: CPT

## 2021-05-05 NOTE — ASSESSMENT
[FreeTextEntry1] : In summary, the patient is a 71-year-old woman with coronary disease. She is currently clinically stable.\par \par There is no cardiac contraindication to the proposed surgeries.\par \par Given her bruising and given the fact that she is a year and a half post stenting have advised the patient to discontinue Plavix and continue her aspirin.

## 2021-05-05 NOTE — REASON FOR VISIT
[Other: ____] : [unfilled] [FreeTextEntry1] : This 71-year-old woman known to me with a history of significant two-vessel coronary disease status post stenting in November 2019. The patient presents today for guidance and cardiac clearance. She is scheduled to have hemorrhoid surgery next week and abdominal hernia surgery 3 weeks from now. The patient denies chest discomfort shortness of breath palpitations dizziness or syncope. Due to atypical symptomatology she underwent vasodilator myocardial perfusion imaging in December of 2020 which was negative for ischemia and revealed a normal ejection fraction.

## 2021-05-11 ENCOUNTER — APPOINTMENT (OUTPATIENT)
Dept: FAMILY MEDICINE | Facility: CLINIC | Age: 71
End: 2021-05-11
Payer: MEDICARE

## 2021-05-11 VITALS
HEART RATE: 66 BPM | TEMPERATURE: 97.9 F | RESPIRATION RATE: 16 BRPM | BODY MASS INDEX: 31.02 KG/M2 | WEIGHT: 158 LBS | SYSTOLIC BLOOD PRESSURE: 127 MMHG | OXYGEN SATURATION: 96 % | HEIGHT: 60 IN | DIASTOLIC BLOOD PRESSURE: 70 MMHG

## 2021-05-11 DIAGNOSIS — K62.5 HEMORRHAGE OF ANUS AND RECTUM: ICD-10-CM

## 2021-05-11 DIAGNOSIS — K64.9 UNSPECIFIED HEMORRHOIDS: ICD-10-CM

## 2021-05-11 DIAGNOSIS — K46.9 UNSPECIFIED ABDOMINAL HERNIA W/OUT OBSTRUCTION OR GANGRENE: ICD-10-CM

## 2021-05-11 PROCEDURE — 99214 OFFICE O/P EST MOD 30 MIN: CPT

## 2021-05-11 NOTE — HEALTH RISK ASSESSMENT
[] : No [No] : In the past 12 months have you used drugs other than those required for medical reasons? No [No falls in past year] : Patient reported no falls in the past year [0] : 2) Feeling down, depressed, or hopeless: Not at all (0) [Audit-CScore] : 0 [[id number]] : 0

## 2021-05-11 NOTE — COUNSELING
[Fall prevention counseling provided] : Fall prevention counseling provided [Adequate lighting] : Adequate lighting [No throw rugs] : No throw rugs [Use proper foot wear] : Use proper foot wear [Use recommended devices] : Use recommended devices [FreeTextEntry1] : cane [Behavioral health counseling provided] : Behavioral health counseling provided [Sleep ___ hours/day] : Sleep [unfilled] hours/day [Engage in a relaxing activity] : Engage in a relaxing activity [Plan in advance] : Plan in advance [AUDIT-C Screening administered and reviewed] : AUDIT-C Screening administered and reviewed [Potential consequences of obesity discussed] : Potential consequences of obesity discussed [Benefits of weight loss discussed] : Benefits of weight loss discussed [Structured Weight Management Program suggested:] : Structured weight management program suggested [Encouraged to maintain food diary] : Encouraged to maintain food diary [Encouraged to increase physical activity] : Encouraged to increase physical activity [Encouraged to use exercise tracking device] : Encouraged to use exercise tracking device [Weigh Self Weekly] : weigh self weekly [Decrease Portions] : decrease portions [____ min/wk Activity] : [unfilled] min/wk activity [Keep Food Diary] : keep food diary [None] : None [Good understanding] : Patient has a good understanding of lifestyle changes and steps needed to achieve self management goal

## 2021-05-11 NOTE — ASSESSMENT
[FreeTextEntry1] : Assessment and plan:\par \par 1.  Incisional ventral hernia extremely uncomfortable for the patient patient will be scheduled for surgery in the near future.  Plavix has been discontinued therefore there is no problem with anticoagulation.\par \par 2.  Rectal bleeding persists secondary to hemorrhoids patient will be undergoing in office procedure tomorrow with gastroenterology to tie off the hemorrhoids.  Patient will be undergoing hemorrhoidal banding tomorrow.\par \par 3.  Ischemic heart disease status post drug-eluting stent x2 it has been greater than 1 year therefore it is fine to discontinue Plavix continue aspirin 81 mg p.o. daily.  Presently the patient is chest pain-free and is feeling well.\par \par 4.  Hypertension excellent blood pressure control with present medical management patient will continue enalapril 2.5 mg p.o. daily and furosemide 20 mg p.o. daily blood pressure is under excellent control and patient's heart rate is well controlled with diltiazem 240 mg p.o. daily.\par \par 5.  Hypothyroidism most recent blood work showed that the TSH or free T4 was within normal limits continue levothyroxine 125 mcg p.o. daily.\par \par 6.  Hyperlipidemia patient is on a low-fat low-cholesterol diet she will increase physical activity as tolerated presently inhibited by the abdominal discomfort that she experiences with the ventral hernia and she will also continue lovastatin 20 mg p.o. daily at bedtime.\par \par 7.  Patient is up-to-date with COVID-19 vaccination has received both doses of Moderna.

## 2021-05-11 NOTE — HISTORY OF PRESENT ILLNESS
[FreeTextEntry1] : See HPI. [de-identified] : Patient is a 71-year-old female who presents today for follow-up and disease management.  Patient's ventral hernia has actually increased in size and that is quite bothersome to the patient it is an incisional hernia.  Patient recently seen by cardiology and basically was given a cardiac clearance for the surgery in fact clopidogrel was discontinued.  Rectal bleeding still occurs its hemorrhoidal bleeding that the patient is experiencing in fact she has appointment for tomorrow to have the hemorrhoids ligated.  We will be addressing the above, ischemic heart disease, hypertension, hyperlipidemia and hypothyroidism.\par \par Presently the patient is awake alert and oriented x3 in no acute distress she is calm and cooperative presently denies any chest pain, shortness of breath nausea or vomiting.  As stated earlier her chief complaint is the abdominal discomfort secondary to incisional ventral hernia and the rectal bleeding which hopefully will be taking care of tomorrow.

## 2021-05-11 NOTE — PHYSICAL EXAM
[Well Nourished] : well nourished [Well Developed] : well developed [Normal Voice/Communication] : normal voice/communication [Ill-Appearing] : ill-appearing [Normal Sclera/Conjunctiva] : normal sclera/conjunctiva [PERRL] : pupils equal round and reactive to light [No JVD] : no jugular venous distention [No Lymphadenopathy] : no lymphadenopathy [Supple] : supple [Normal Rate] : normal rate  [Regular Rhythm] : with a regular rhythm [Normal S1, S2] : normal S1 and S2 [No Carotid Bruits] : no carotid bruits [No Abdominal Bruit] : a ~M bruit was not heard ~T in the abdomen [No Varicosities] : no varicosities [Pedal Pulses Present] : the pedal pulses are present [No Edema] : there was no peripheral edema [No Palpable Aorta] : no palpable aorta [No Extremity Clubbing/Cyanosis] : no extremity clubbing/cyanosis [Soft] : abdomen soft [Non-distended] : non-distended [No Masses] : no abdominal mass palpated [Normal Bowel Sounds] : normal bowel sounds [No Joint Swelling] : no joint swelling [Speech Grossly Normal] : speech grossly normal [Memory Grossly Normal] : memory grossly normal [Normal Affect] : the affect was normal [Alert and Oriented x3] : oriented to person, place, and time [Normal Mood] : the mood was normal [Normal Insight/Judgement] : insight and judgment were intact [Normal] : affect was normal and insight and judgment were intact [Comprehensive Foot Exam Normal] : Right and left foot were examined and both feet are normal. No ulcers in either foot. Toes are normal and with full ROM.  Normal tactile sensation with monofilament testing throughout both feet [de-identified] : No guarding or rebound, incisional abdominal hernia has increased in size [FreeTextEntry1] : GI, colonoscopy and capsule endoscopy recently done [de-identified] : Back pain has improved [de-identified] : diffuse joint pain without swelling

## 2021-05-28 ENCOUNTER — LABORATORY RESULT (OUTPATIENT)
Age: 71
End: 2021-05-28

## 2021-05-28 ENCOUNTER — APPOINTMENT (OUTPATIENT)
Dept: FAMILY MEDICINE | Facility: CLINIC | Age: 71
End: 2021-05-28
Payer: MEDICARE

## 2021-05-28 VITALS
RESPIRATION RATE: 15 BRPM | DIASTOLIC BLOOD PRESSURE: 60 MMHG | SYSTOLIC BLOOD PRESSURE: 124 MMHG | WEIGHT: 158 LBS | TEMPERATURE: 97.6 F | BODY MASS INDEX: 31.02 KG/M2 | HEART RATE: 66 BPM | HEIGHT: 60 IN | OXYGEN SATURATION: 96 %

## 2021-05-28 DIAGNOSIS — K43.2 INCISIONAL HERNIA W/OUT OBSTRUCTION OR GANGRENE: ICD-10-CM

## 2021-05-28 PROCEDURE — 99215 OFFICE O/P EST HI 40 MIN: CPT | Mod: 25

## 2021-05-28 PROCEDURE — 36415 COLL VENOUS BLD VENIPUNCTURE: CPT

## 2021-05-28 NOTE — ASSESSMENT
[High Risk Surgery - Intraperitoneal, Intrathoracic or Supringuinal Vascular Procedures] : High Risk Surgery - Intraperitoneal, Intrathoracic or Supringuinal Vascular Procedures - Yes (1) [Ischemic Heart Disease] : Ischemic Heart Disease  - Yes (1) [Congestive Heart Failure] : Congestive Heart Failure - No (0) [Prior Cerebrovascular Accident or TIA] : Prior Cerebrovascular Accident or TIA - No (0) [Creatinine >= 2mg/dL (1 Point)] : Creatinine >= 2mg/dL - No (0) [Insulin-dependent Diabetic (1 Point)] : Insulin-dependent Diabetic - No (0) [2] : 2 , RCRI Class: III, Risk of Post-Op Cardiac Complications: 10.1%, 95% CI for Risk Estimate: 8.1% - 12.6% [Patient NOT optimized for Surgery at this time] : Patient not optimized for surgery at this time [Other: _____] : [unfilled] [Continue medications as is] : Continue current medications

## 2021-05-28 NOTE — COUNSELING
[Weight management counseling provided] : Weight management [Healthy eating counseling provided] : healthy eating [Activity counseling provided] : activity [Behavioral health counseling provided] : behavioral health  [Fall prevention counseling provided] : fall prevention  [Weigh Self Once Weekly] : Weigh self once weekly [Low Fat Diet] : Low fat diet [Low Salt Diet] : Low salt diet [Decrease Portions] : Decrease food portions [Keep Food Diary] : Keep food diary [___ min/wk activity recommended] : [unfilled] min/wk activity recommended [Walking] : Walking [Sleep ___ hours/day] : Sleep [unfilled] hours/day [Engage in a relaxing activity] : Engage in a relaxing activity [Plan in advance] : Plan in advance [Adequate lighting] : Adequate lighting [No throw rugs] : No throw rugs [Use proper foot wear] : Use proper foot wear [Use recommended devices] : Use recommended devices [Patient motivation] : Patient motivation

## 2021-05-31 LAB
BASOPHILS # BLD AUTO: 2.72 K/UL
BASOPHILS NFR BLD AUTO: 12.7 %
EOSINOPHIL # BLD AUTO: 0.9 K/UL
EOSINOPHIL NFR BLD AUTO: 4.2 %
HCT VFR BLD CALC: 37.1 %
HGB BLD-MCNC: 10.6 G/DL
LYMPHOCYTES # BLD AUTO: 3.28 K/UL
LYMPHOCYTES NFR BLD AUTO: 15.3 %
MAN DIFF?: NORMAL
MCHC RBC-ENTMCNC: 25.9 PG
MCHC RBC-ENTMCNC: 28.6 GM/DL
MCV RBC AUTO: 90.5 FL
MONOCYTES # BLD AUTO: 1.09 K/UL
MONOCYTES NFR BLD AUTO: 5.1 %
NEUTROPHILS # BLD AUTO: 12.34 K/UL
NEUTROPHILS NFR BLD AUTO: 55.9 %
PLATELET # BLD AUTO: 447 K/UL
RBC # BLD: 4.1 M/UL
RBC # FLD: 15.9 %
WBC # FLD AUTO: 21.43 K/UL

## 2021-06-02 NOTE — PLAN
[FreeTextEntry1] : Assessment and plan:\par \par 1.  Incisional ventral hernia which is extremely uncomfortable for the patient would require repair.  Unfortunately patient has an elevated white count a leukocytosis of 19.25 most recently it was 18,000 there is no sign of infection this may be a reactive process or a blood dyscrasia.  My recommendations are prior to surgery hematology evaluation.\par \par 2.  Patient also has underlying anemia which is explained by the fact that the patient had rectal bleeding which was hemorrhoidal in nature and recently had hemorrhoidal banding.  The remainder of the blood work is stable for the patient.\par \par Patient has appointment with hematology on June 1 at 1 PM appointment was made from this office directly with Dr. Watson' office.  If patient is cleared by hematology I will update the clearance.\par

## 2021-06-02 NOTE — PHYSICAL EXAM
[Well Nourished] : well nourished [Well Developed] : well developed [Normal Voice/Communication] : normal voice/communication [Ill-Appearing] : ill-appearing [Normal Sclera/Conjunctiva] : normal sclera/conjunctiva [PERRL] : pupils equal round and reactive to light [No JVD] : no jugular venous distention [No Lymphadenopathy] : no lymphadenopathy [Supple] : supple [Normal Rate] : normal rate  [Regular Rhythm] : with a regular rhythm [Normal S1, S2] : normal S1 and S2 [No Carotid Bruits] : no carotid bruits [No Abdominal Bruit] : a ~M bruit was not heard ~T in the abdomen [No Varicosities] : no varicosities [Pedal Pulses Present] : the pedal pulses are present [No Edema] : there was no peripheral edema [No Palpable Aorta] : no palpable aorta [No Extremity Clubbing/Cyanosis] : no extremity clubbing/cyanosis [Soft] : abdomen soft [Non-distended] : non-distended [No Masses] : no abdominal mass palpated [Normal Bowel Sounds] : normal bowel sounds [No Joint Swelling] : no joint swelling [Speech Grossly Normal] : speech grossly normal [Memory Grossly Normal] : memory grossly normal [Normal Affect] : the affect was normal [Alert and Oriented x3] : oriented to person, place, and time [Normal Mood] : the mood was normal [Normal Insight/Judgement] : insight and judgment were intact [Normal] : affect was normal and insight and judgment were intact [Comprehensive Foot Exam Normal] : Right and left foot were examined and both feet are normal. No ulcers in either foot. Toes are normal and with full ROM.  Normal tactile sensation with monofilament testing throughout both feet [de-identified] : No guarding or rebound, incisional abdominal hernia has increased in size [FreeTextEntry1] : GI, colonoscopy and capsule endoscopy recently done patient recently had hemorrhoidal banding. [de-identified] : Back pain has improved [de-identified] : diffuse joint pain without swelling

## 2021-06-02 NOTE — HISTORY OF PRESENT ILLNESS
[Coronary Artery Disease] : coronary artery disease [COPD] : COPD [(Patient denies any chest pain, claudication, dyspnea on exertion, orthopnea, palpitations or syncope)] : Patient denies any chest pain, claudication, dyspnea on exertion, orthopnea, palpitations or syncope [Aortic Stenosis] : no aortic stenosis [Atrial Fibrillation] : no atrial fibrillation [Recent Myocardial Infarction] : no recent myocardial infarction [Asthma] : no asthma [Implantable Device/Pacemaker] : no implantable device/pacemaker [Sleep Apnea] : no sleep apnea [Smoker] : not a smoker [Family Member] : no family member with adverse anesthesia reaction/sudden death [Self] : no previous adverse anesthesia reaction [Chronic Anticoagulation] : no chronic anticoagulation [Chronic Kidney Disease] : no chronic kidney disease [Diabetes] : no diabetes [FreeTextEntry1] : Ventral hernia repair [FreeTextEntry2] : June 4, 2021 [FreeTextEntry3] : Dr. Antonio Medrano [FreeTextEntry4] : Patient is a 71-year-old female who presents today for medical clearance patient is going to be having ventral hernia repair.  Surgery will partake at [facility] on June 4, 2021 and surgery will be performed by Dr. Antonio Medrano.  Medical history is significant for abdominal pain and discomfort secondary to the incisional ventral hernia, ischemic heart disease well controlled patient is chest pain-free recently had cardiology evaluation and has been cleared by cardiology, hypertension, hypothyroidism and osteoarthritis.\par \par Presently the patient is awake alert and oriented x3 in no acute distress except for the abdominal discomfort. [FreeTextEntry7] : Patient had nuclear stress test which was done December 16, 2020 and showed no ischemic changes the left ventricle was normal in size normal myocardial perfusion scan with no evidence of infarction or inducible ischemia.  Gated wall motion analysis shows normal wall motion and post stress left ventricular ejection fraction of 60%.  Preop scratch that\par \par Preop electrocardiogram shows sinus bradycardia with no acute ST-T wave changes.  Acceptable.

## 2021-06-02 NOTE — RESULTS/DATA
[] : results reviewed [de-identified] : Patient has a leukocytosis of 19,000 higher than usual and a hemoglobin of 10.4 which is relatively stable for the patient. [de-identified] : Glomerular filtration rate 49 stable for patient the remainder of the chemistries are stable and within normal limits. [de-identified] : No acute ST-T wave changes sinus bradycardia.

## 2021-06-02 NOTE — ADDENDUM
[FreeTextEntry1] : Addendum to preop note of May 28, 2021:\par \par Leukocytosis is being worked up patient has been seen by hematology oncology subsequently hematology has cleared patient for surgery.  There is no medical contraindication for the proposed procedure patient may proceed with hernia repair.

## 2021-06-11 ENCOUNTER — NON-APPOINTMENT (OUTPATIENT)
Age: 71
End: 2021-06-11

## 2021-06-24 ENCOUNTER — APPOINTMENT (OUTPATIENT)
Dept: FAMILY MEDICINE | Facility: CLINIC | Age: 71
End: 2021-06-24
Payer: MEDICARE

## 2021-06-24 VITALS
HEART RATE: 45 BPM | WEIGHT: 159 LBS | TEMPERATURE: 97.3 F | OXYGEN SATURATION: 96 % | HEIGHT: 60 IN | BODY MASS INDEX: 31.22 KG/M2 | SYSTOLIC BLOOD PRESSURE: 120 MMHG | RESPIRATION RATE: 15 BRPM | DIASTOLIC BLOOD PRESSURE: 64 MMHG

## 2021-06-24 PROCEDURE — 99214 OFFICE O/P EST MOD 30 MIN: CPT

## 2021-06-24 RX ORDER — CLOPIDOGREL BISULFATE 75 MG/1
75 TABLET, FILM COATED ORAL
Qty: 90 | Refills: 1 | Status: COMPLETED | COMMUNITY
Start: 2019-11-12 | End: 2021-06-24

## 2021-06-24 NOTE — HEALTH RISK ASSESSMENT
[No] : In the past 12 months have you used drugs other than those required for medical reasons? No [No falls in past year] : Patient reported no falls in the past year [0] : 2) Feeling down, depressed, or hopeless: Not at all (0) [None] : None [Alone] : lives alone [] :  [Fully functional (bathing, dressing, toileting, transferring, walking, feeding)] : Fully functional (bathing, dressing, toileting, transferring, walking, feeding) [Fully functional (using the telephone, shopping, preparing meals, housekeeping, doing laundry, using] : Fully functional and needs no help or supervision to perform IADLs (using the telephone, shopping, preparing meals, housekeeping, doing laundry, using transportation, managing medications and managing finances) [] : No [Audit-CScore] : 0 [[id number]] : 0 [Change in mental status noted] : No change in mental status noted [Language] : denies difficulty with language [Behavior] : denies difficulty with behavior [Learning/Retaining New Information] : denies difficulty learning/retaining new information [Handling Complex Tasks] : denies difficulty handling complex tasks [Reasoning] : denies difficulty with reasoning [Spatial Ability and Orientation] : denies difficulty with spatial ability and orientation

## 2021-06-24 NOTE — ASSESSMENT
[FreeTextEntry1] : Assessment and plan:\par \par 1.  Status post abdominal hernia repair patient is healing well continue present precautions.\par \par 2.  Leukocytosis patient will be following up with hematology oncology.\par \par 3.  Ischemic heart disease status post stent Plavix has been discontinued and patient will continue aspirin 81 mg p.o. daily.\par \par 4.  Hypertension excellent blood pressure control patient will continue diltiazem 240 mg, enalapril 2.5 mg and Lasix 20 mg.\par \par 5.  Hyperuricemia continue allopurinol\par \par 6.  Hyperlipidemia patient following low-fat low-cholesterol diet and also weight loss program continue lovastatin 20 mg p.o. daily\par \par 7.  Hypothyroidism patient tolerating and doing well with levothyroxine 125 mcg.

## 2021-06-24 NOTE — HISTORY OF PRESENT ILLNESS
[Post-hospitalization from ___ Hospital] : Post-hospitalization from [unfilled] Hospital [Admitted on: ___] : The patient was admitted on [unfilled] [Discharged on ___] : discharged on [unfilled] [Discharge Summary] : discharge summary [Pertinent Labs] : pertinent labs [Discharge Med List] : discharge medication list [Med Reconciliation] : medication reconciliation has been completed [Patient Contacted By: ____] : and contacted by [unfilled] [FreeTextEntry2] : Patient is a 71-year-old female who presents today for follow-up status post hospitalization patient had a large complicated abdominal hernia repaired which required 5-day hospitalization.  Medical history is significant for leukocytosis presently being worked up  by hematology it may be some form of chronic leukocytic leukemia, ischemic heart disease, hypertension, hyperuricemia, hyperlipidemia, and hypothyroidism.\par \par Presently the patient is awake alert and oriented x3 in no acute distress calm and cooperative.

## 2021-06-24 NOTE — PHYSICAL EXAM
[Well Nourished] : well nourished [Well Developed] : well developed [Normal Voice/Communication] : normal voice/communication [Ill-Appearing] : ill-appearing [Normal Sclera/Conjunctiva] : normal sclera/conjunctiva [PERRL] : pupils equal round and reactive to light [No JVD] : no jugular venous distention [No Lymphadenopathy] : no lymphadenopathy [Supple] : supple [Normal Rate] : normal rate  [Regular Rhythm] : with a regular rhythm [Normal S1, S2] : normal S1 and S2 [No Carotid Bruits] : no carotid bruits [No Abdominal Bruit] : a ~M bruit was not heard ~T in the abdomen [No Varicosities] : no varicosities [Pedal Pulses Present] : the pedal pulses are present [No Edema] : there was no peripheral edema [No Palpable Aorta] : no palpable aorta [No Extremity Clubbing/Cyanosis] : no extremity clubbing/cyanosis [Soft] : abdomen soft [Non-distended] : non-distended [No Masses] : no abdominal mass palpated [Normal Bowel Sounds] : normal bowel sounds [No Joint Swelling] : no joint swelling [Speech Grossly Normal] : speech grossly normal [Memory Grossly Normal] : memory grossly normal [Normal Affect] : the affect was normal [Alert and Oriented x3] : oriented to person, place, and time [Normal Mood] : the mood was normal [Normal Insight/Judgement] : insight and judgment were intact [Normal] : affect was normal and insight and judgment were intact [Comprehensive Foot Exam Normal] : Right and left foot were examined and both feet are normal. No ulcers in either foot. Toes are normal and with full ROM.  Normal tactile sensation with monofilament testing throughout both feet [de-identified] : Status post abdominal hernia repair healing well [FreeTextEntry1] : GI, colonoscopy and capsule endoscopy recently done patient recently had hemorrhoidal banding. [de-identified] : Back pain has improved [de-identified] : diffuse joint pain without swelling

## 2021-07-07 ENCOUNTER — RX RENEWAL (OUTPATIENT)
Age: 71
End: 2021-07-07

## 2021-07-15 ENCOUNTER — NON-APPOINTMENT (OUTPATIENT)
Age: 71
End: 2021-07-15

## 2021-07-15 ENCOUNTER — APPOINTMENT (OUTPATIENT)
Dept: CARDIOLOGY | Facility: CLINIC | Age: 71
End: 2021-07-15
Payer: MEDICARE

## 2021-07-15 VITALS — DIASTOLIC BLOOD PRESSURE: 72 MMHG | HEART RATE: 72 BPM | SYSTOLIC BLOOD PRESSURE: 140 MMHG

## 2021-07-15 VITALS
WEIGHT: 156 LBS | HEART RATE: 54 BPM | SYSTOLIC BLOOD PRESSURE: 152 MMHG | OXYGEN SATURATION: 98 % | HEIGHT: 60 IN | BODY MASS INDEX: 30.63 KG/M2 | RESPIRATION RATE: 16 BRPM | DIASTOLIC BLOOD PRESSURE: 81 MMHG | TEMPERATURE: 98.1 F

## 2021-07-15 PROCEDURE — 93000 ELECTROCARDIOGRAM COMPLETE: CPT

## 2021-07-15 PROCEDURE — 99214 OFFICE O/P EST MOD 30 MIN: CPT

## 2021-07-15 NOTE — REASON FOR VISIT
[Coronary Artery Disease] : coronary artery disease [FreeTextEntry1] : Patient returns for followup. She offers no cardiac complaints. Since she was last here she has had leukocytosis and believes she has leukemia. She is currently being treated with 2 medications the names of which she does not know and states she had a bone marrow test yesterday

## 2021-07-15 NOTE — ASSESSMENT
[FreeTextEntry1] : Impression:\par 1. Coronary disease status post stenting currently stable\par \par \par Plan:\par 1. Continue current medical regimen

## 2021-07-23 ENCOUNTER — LABORATORY RESULT (OUTPATIENT)
Age: 71
End: 2021-07-23

## 2021-07-23 ENCOUNTER — APPOINTMENT (OUTPATIENT)
Dept: FAMILY MEDICINE | Facility: CLINIC | Age: 71
End: 2021-07-23
Payer: MEDICARE

## 2021-07-23 VITALS
HEIGHT: 60 IN | WEIGHT: 156 LBS | DIASTOLIC BLOOD PRESSURE: 68 MMHG | BODY MASS INDEX: 30.63 KG/M2 | RESPIRATION RATE: 14 BRPM | SYSTOLIC BLOOD PRESSURE: 130 MMHG | TEMPERATURE: 97.6 F | HEART RATE: 63 BPM | OXYGEN SATURATION: 98 %

## 2021-07-23 DIAGNOSIS — I73.9 PERIPHERAL VASCULAR DISEASE, UNSPECIFIED: ICD-10-CM

## 2021-07-23 DIAGNOSIS — D72.829 ELEVATED WHITE BLOOD CELL COUNT, UNSPECIFIED: ICD-10-CM

## 2021-07-23 PROCEDURE — 99214 OFFICE O/P EST MOD 30 MIN: CPT | Mod: 25

## 2021-07-23 PROCEDURE — 36415 COLL VENOUS BLD VENIPUNCTURE: CPT

## 2021-07-23 NOTE — HEALTH RISK ASSESSMENT
[No] : In the past 12 months have you used drugs other than those required for medical reasons? No [No falls in past year] : Patient reported no falls in the past year [0] : 2) Feeling down, depressed, or hopeless: Not at all (0) [PHQ-2 Negative - No further assessment needed] : PHQ-2 Negative - No further assessment needed [] : No [Audit-CScore] : 0 [[id number]] : 0

## 2021-07-23 NOTE — PHYSICAL EXAM
[Well Nourished] : well nourished [Well Developed] : well developed [Normal Voice/Communication] : normal voice/communication [Ill-Appearing] : ill-appearing [Normal Sclera/Conjunctiva] : normal sclera/conjunctiva [PERRL] : pupils equal round and reactive to light [No JVD] : no jugular venous distention [No Lymphadenopathy] : no lymphadenopathy [Supple] : supple [Normal Rate] : normal rate  [Regular Rhythm] : with a regular rhythm [Normal S1, S2] : normal S1 and S2 [No Carotid Bruits] : no carotid bruits [No Abdominal Bruit] : a ~M bruit was not heard ~T in the abdomen [No Varicosities] : no varicosities [Pedal Pulses Present] : the pedal pulses are present [No Edema] : there was no peripheral edema [No Palpable Aorta] : no palpable aorta [No Extremity Clubbing/Cyanosis] : no extremity clubbing/cyanosis [Soft] : abdomen soft [Non-distended] : non-distended [No Masses] : no abdominal mass palpated [Normal Bowel Sounds] : normal bowel sounds [No Joint Swelling] : no joint swelling [Speech Grossly Normal] : speech grossly normal [Memory Grossly Normal] : memory grossly normal [Normal Affect] : the affect was normal [Alert and Oriented x3] : oriented to person, place, and time [Normal Mood] : the mood was normal [Normal Insight/Judgement] : insight and judgment were intact [Normal] : affect was normal and insight and judgment were intact [Comprehensive Foot Exam Normal] : Right and left foot were examined and both feet are normal. No ulcers in either foot. Toes are normal and with full ROM.  Normal tactile sensation with monofilament testing throughout both feet [de-identified] : Status post abdominal hernia repair healing well [de-identified] : Back pain has improved [de-identified] : diffuse joint pain without swelling

## 2021-07-23 NOTE — ASSESSMENT
[FreeTextEntry1] : Assessment and plan:\par \par 1.  Status post abdominal hernia repair patient has healed well.\par \par 2.  Leukocytosis ; patient diagnosed with CML she is stable and will be following up with hematology oncology but she does admit to feeling fatigued.\par \par 3.  Ischemic heart disease status post stent Plavix has been discontinued and patient will continue aspirin 81 mg p.o. daily.  Recently seen by cardiology.  I reviewed with patient cardiology evaluation continue present medical management without change.\par \par 4.  Hypertension excellent blood pressure control patient will continue diltiazem 240 mg, enalapril 2.5 mg and Lasix 20 mg.\par \par 5.  Hyperuricemia continue allopurinol\par \par 6.  Hyperlipidemia patient following low-fat low-cholesterol diet and also weight loss program continue lovastatin 20 mg p.o. daily\par \par 7.  Hypothyroidism patient tolerating and doing well with levothyroxine 125 mcg.\par \par 8.  Peripheral vascular disease patient no longer complaining of claudication she has been evaluated by vascular surgery in the past she is doing extremely well and is stable at this time no further work-up at this time.\par \par 9.  Chronic renal insufficiency stage III up until now totally asymptomatic comprehensive blood work was drawn in office by examiner I will be checking kidney functions patient is instructed to avoid all nephrotoxic medications and to keep herself well-hydrated at least 64 ounces of fluid daily.

## 2021-07-23 NOTE — HISTORY OF PRESENT ILLNESS
[FreeTextEntry1] : See HPI. [de-identified] : Patient is a 71-year-old female who presents today for follow-up and disease management patient recently diagnosed with CML recently seen by hematology oncology and patient has been placed on Tasigna.  We will be addressing coronary artery disease patient was recently seen by cardiology, hypertension, hyperuricemia, hyperlipidemia, hypothyroidism, peripheral vascular disease presently well-controlled asymptomatic and chronic renal insufficiency stage III which has been stable I will be obtaining comprehensive blood work at today's visit.

## 2021-07-26 LAB
ALBUMIN SERPL ELPH-MCNC: 4.1 G/DL
ALP BLD-CCNC: 78 U/L
ALT SERPL-CCNC: 8 U/L
ANION GAP SERPL CALC-SCNC: 11 MMOL/L
AST SERPL-CCNC: 18 U/L
BASOPHILS # BLD AUTO: 0.57 K/UL
BASOPHILS NFR BLD AUTO: 6.1 %
BILIRUB SERPL-MCNC: 0.5 MG/DL
BUN SERPL-MCNC: 22 MG/DL
CALCIUM SERPL-MCNC: 9.1 MG/DL
CHLORIDE SERPL-SCNC: 106 MMOL/L
CO2 SERPL-SCNC: 24 MMOL/L
CREAT SERPL-MCNC: 1.1 MG/DL
EOSINOPHIL # BLD AUTO: 0.57 K/UL
EOSINOPHIL NFR BLD AUTO: 6.1 %
FERRITIN SERPL-MCNC: 56 NG/ML
FOLATE SERPL-MCNC: >20 NG/ML
GLUCOSE SERPL-MCNC: 89 MG/DL
HCT VFR BLD CALC: 30.9 %
HGB BLD-MCNC: 9.1 G/DL
IRON SATN MFR SERPL: 6 %
IRON SERPL-MCNC: 18 UG/DL
LYMPHOCYTES # BLD AUTO: 0.91 K/UL
LYMPHOCYTES NFR BLD AUTO: 9.7 %
MAN DIFF?: NORMAL
MCHC RBC-ENTMCNC: 25.7 PG
MCHC RBC-ENTMCNC: 29.4 GM/DL
MCV RBC AUTO: 87.3 FL
MONOCYTES # BLD AUTO: 0.5 K/UL
MONOCYTES NFR BLD AUTO: 5.3 %
NEUTROPHILS # BLD AUTO: 6.84 K/UL
NEUTROPHILS NFR BLD AUTO: 72.8 %
PLATELET # BLD AUTO: 358 K/UL
POTASSIUM SERPL-SCNC: 4.9 MMOL/L
PROT SERPL-MCNC: 6.8 G/DL
RBC # BLD: 3.54 M/UL
RBC # FLD: 16.1 %
SODIUM SERPL-SCNC: 141 MMOL/L
T4 FREE SERPL-MCNC: 1.3 NG/DL
TIBC SERPL-MCNC: 328 UG/DL
TSH SERPL-ACNC: 1.32 UIU/ML
UIBC SERPL-MCNC: 310 UG/DL
URATE SERPL-MCNC: 4.7 MG/DL
VIT B12 SERPL-MCNC: 1527 PG/ML
WBC # FLD AUTO: 9.4 K/UL

## 2021-09-14 ENCOUNTER — APPOINTMENT (OUTPATIENT)
Dept: FAMILY MEDICINE | Facility: CLINIC | Age: 71
End: 2021-09-14
Payer: MEDICARE

## 2021-09-14 VITALS
WEIGHT: 147 LBS | HEART RATE: 67 BPM | BODY MASS INDEX: 28.86 KG/M2 | HEIGHT: 60 IN | DIASTOLIC BLOOD PRESSURE: 67 MMHG | OXYGEN SATURATION: 95 % | SYSTOLIC BLOOD PRESSURE: 120 MMHG | TEMPERATURE: 98.2 F | RESPIRATION RATE: 14 BRPM

## 2021-09-14 DIAGNOSIS — G47.00 INSOMNIA, UNSPECIFIED: ICD-10-CM

## 2021-09-14 PROCEDURE — 99214 OFFICE O/P EST MOD 30 MIN: CPT | Mod: 25

## 2021-09-14 PROCEDURE — G0008: CPT

## 2021-09-14 PROCEDURE — 90662 IIV NO PRSV INCREASED AG IM: CPT

## 2021-09-14 RX ORDER — PANTOPRAZOLE 40 MG/1
40 TABLET, DELAYED RELEASE ORAL
Qty: 30 | Refills: 0 | Status: COMPLETED | COMMUNITY
Start: 2021-08-26

## 2021-09-14 RX ORDER — ONDANSETRON 4 MG/1
4 TABLET ORAL
Qty: 20 | Refills: 0 | Status: COMPLETED | COMMUNITY
Start: 2021-08-12

## 2021-09-14 NOTE — HEALTH RISK ASSESSMENT
[No] : In the past 12 months have you used drugs other than those required for medical reasons? No [No falls in past year] : Patient reported no falls in the past year [0] : 2) Feeling down, depressed, or hopeless: Not at all (0) [PHQ-2 Negative - No further assessment needed] : PHQ-2 Negative - No further assessment needed [] : No [Audit-CScore] : 0 [[id number]] : 0

## 2021-09-14 NOTE — ASSESSMENT
[FreeTextEntry1] : Assessment and plan:\par \par 1.  Status post abdominal hernia repair patient has healed well.  Patient feeling well and has been discharged by surgery.\par \par 2. CML she is stable and will be following up with hematology oncology.  Leukocytosis has resolved with treatment.\par \par 3.  Ischemic heart disease status post stent Plavix has been discontinued and patient will continue aspirin 81 mg p.o. daily.  Recently seen by cardiology.  I reviewed with patient cardiology evaluation continue present medical management without change.\par \par 4.  Hypertension excellent blood pressure control patient will continue diltiazem 240 mg, enalapril 2.5 mg and Lasix 20 mg.\par \par 5.  Hyperuricemia continue allopurinol\par \par 6.  Hyperlipidemia patient following low-fat low-cholesterol diet and also weight loss program continue lovastatin 20 mg p.o. daily\par \par 7.  Hypothyroidism patient tolerating and doing well with levothyroxine 125 mcg.\par \par 8.  Peripheral vascular disease patient no longer complaining of claudication she has been evaluated by vascular surgery in the past she is doing extremely well and is stable at this time no further work-up at this time.\par \par 9.  Chronic renal insufficiency stage III stable for patient.\par \par 10.  Fluzone high-dose administered by examiner at this visit.

## 2021-09-14 NOTE — PHYSICAL EXAM
[Well Nourished] : well nourished [Well Developed] : well developed [Normal Voice/Communication] : normal voice/communication [Ill-Appearing] : ill-appearing [Normal Sclera/Conjunctiva] : normal sclera/conjunctiva [PERRL] : pupils equal round and reactive to light [No JVD] : no jugular venous distention [No Lymphadenopathy] : no lymphadenopathy [Supple] : supple [Normal Rate] : normal rate  [Regular Rhythm] : with a regular rhythm [Normal S1, S2] : normal S1 and S2 [No Carotid Bruits] : no carotid bruits [No Abdominal Bruit] : a ~M bruit was not heard ~T in the abdomen [No Varicosities] : no varicosities [Pedal Pulses Present] : the pedal pulses are present [No Edema] : there was no peripheral edema [No Palpable Aorta] : no palpable aorta [No Extremity Clubbing/Cyanosis] : no extremity clubbing/cyanosis [Soft] : abdomen soft [Non-distended] : non-distended [No Masses] : no abdominal mass palpated [Normal Bowel Sounds] : normal bowel sounds [No Joint Swelling] : no joint swelling [Speech Grossly Normal] : speech grossly normal [Memory Grossly Normal] : memory grossly normal [Normal Affect] : the affect was normal [Alert and Oriented x3] : oriented to person, place, and time [Normal Mood] : the mood was normal [Normal Insight/Judgement] : insight and judgment were intact [Normal] : affect was normal and insight and judgment were intact [Comprehensive Foot Exam Normal] : Right and left foot were examined and both feet are normal. No ulcers in either foot. Toes are normal and with full ROM.  Normal tactile sensation with monofilament testing throughout both feet [de-identified] : Status post abdominal hernia repair healing well [de-identified] : Back pain has improved [de-identified] : diffuse joint pain without swelling

## 2021-09-14 NOTE — HISTORY OF PRESENT ILLNESS
[FreeTextEntry1] : See HPI. [de-identified] : Is a 11-year-old female who presents today for follow-up and disease management patient states that she is in her usual state of health will be following up with hematology oncology in 1 week.  Adequate history is significant for CML, ischemic heart disease, hypertension, hyperuricemia, hyperlipidemia, hypothyroidism, peripheral vascular disease and chronic renal insufficiency stage III which has been stable.

## 2021-09-14 NOTE — COUNSELING
[Fall prevention counseling provided] : Fall prevention counseling provided [Adequate lighting] : Adequate lighting [No throw rugs] : No throw rugs [Use proper foot wear] : Use proper foot wear [Sleep ___ hours/day] : Sleep [unfilled] hours/day [Behavioral health counseling provided] : Behavioral health counseling provided [Engage in a relaxing activity] : Engage in a relaxing activity [Plan in advance] : Plan in advance [AUDIT-C Screening administered and reviewed] : AUDIT-C Screening administered and reviewed [Potential consequences of obesity discussed] : Potential consequences of obesity discussed [Benefits of weight loss discussed] : Benefits of weight loss discussed [Structured Weight Management Program suggested:] : Structured weight management program suggested [Encouraged to maintain food diary] : Encouraged to maintain food diary [Encouraged to increase physical activity] : Encouraged to increase physical activity [Encouraged to use exercise tracking device] : Encouraged to use exercise tracking device [Weigh Self Weekly] : weigh self weekly [Decrease Portions] : decrease portions [____ min/wk Activity] : [unfilled] min/wk activity [Keep Food Diary] : keep food diary [None] : None [Good understanding] : Patient has a good understanding of lifestyle changes and steps needed to achieve self management goal

## 2021-09-28 ENCOUNTER — RX RENEWAL (OUTPATIENT)
Age: 71
End: 2021-09-28

## 2021-10-05 ENCOUNTER — TRANSCRIPTION ENCOUNTER (OUTPATIENT)
Age: 71
End: 2021-10-05

## 2021-10-26 ENCOUNTER — APPOINTMENT (OUTPATIENT)
Dept: FAMILY MEDICINE | Facility: CLINIC | Age: 71
End: 2021-10-26
Payer: MEDICARE

## 2021-10-26 VITALS
HEIGHT: 60 IN | SYSTOLIC BLOOD PRESSURE: 134 MMHG | DIASTOLIC BLOOD PRESSURE: 76 MMHG | WEIGHT: 143 LBS | TEMPERATURE: 98 F | OXYGEN SATURATION: 96 % | HEART RATE: 60 BPM | RESPIRATION RATE: 14 BRPM | BODY MASS INDEX: 28.07 KG/M2

## 2021-10-26 DIAGNOSIS — H61.22 IMPACTED CERUMEN, LEFT EAR: ICD-10-CM

## 2021-10-26 PROCEDURE — 36415 COLL VENOUS BLD VENIPUNCTURE: CPT

## 2021-10-26 PROCEDURE — 99214 OFFICE O/P EST MOD 30 MIN: CPT | Mod: 25

## 2021-10-26 NOTE — HEALTH RISK ASSESSMENT
[No] : In the past 12 months have you used drugs other than those required for medical reasons? No [No falls in past year] : Patient reported no falls in the past year [0] : 2) Feeling down, depressed, or hopeless: Not at all (0) [PHQ-2 Negative - No further assessment needed] : PHQ-2 Negative - No further assessment needed [] : No [Audit-CScore] : 0 [[id number]] : 0

## 2021-10-26 NOTE — ASSESSMENT
[FreeTextEntry1] : Assessment and plan:\par \par 1.  Status post abdominal hernia repair patient has healed well.  Patient feeling well and has been discharged by surgery.\par \par 2. CML she is stable and will be following up with hematology oncology.  Leukocytosis has resolved with treatment.  Comprehensive blood work drawn in office by examiner at today's visit.\par \par 3.  Ischemic heart disease status post stent Plavix has been discontinued and patient will continue aspirin 81 mg p.o. daily.  Recently seen by cardiology.  I reviewed with patient cardiology evaluation continue present medical management without change.\par \par 4.  Hypertension excellent blood pressure control patient will continue diltiazem 240 mg, enalapril 2.5 mg and Lasix 20 mg.\par \par 5.  Hyperuricemia continue allopurinol, check uric acid levels.\par \par 6.  Hyperlipidemia patient following low-fat low-cholesterol diet and also weight loss program continue lovastatin 20 mg p.o. daily\par \par 7.  Hypothyroidism patient tolerating and doing well with levothyroxine 125 mcg.\par \par 8.  Peripheral vascular disease patient no longer complaining of claudication she has been evaluated by vascular surgery  she is doing extremely well and is stable at this time no further work-up at this time.\par \par 9.  Chronic renal insufficiency stage III stable for patient.  Patient is totally asymptomatic.\par \par 10.  Dizziness by physical exam it appears that the patient has benign positional vertigo we will attempt meclizine 12.5 mg up to 3 times a day as needed for vertigo.  Patient has some hard cerumen bilaterally I recommend ENT evaluation.

## 2021-10-26 NOTE — PHYSICAL EXAM
[No Acute Distress] : no acute distress [Well Nourished] : well nourished [Well Developed] : well developed [Normal Voice/Communication] : normal voice/communication [Ill-Appearing] : ill-appearing [Normal Sclera/Conjunctiva] : normal sclera/conjunctiva [PERRL] : pupils equal round and reactive to light [No JVD] : no jugular venous distention [No Lymphadenopathy] : no lymphadenopathy [Supple] : supple [Normal Rate] : normal rate  [Regular Rhythm] : with a regular rhythm [Normal S1, S2] : normal S1 and S2 [No Carotid Bruits] : no carotid bruits [No Abdominal Bruit] : a ~M bruit was not heard ~T in the abdomen [No Varicosities] : no varicosities [Pedal Pulses Present] : the pedal pulses are present [No Edema] : there was no peripheral edema [No Palpable Aorta] : no palpable aorta [No Extremity Clubbing/Cyanosis] : no extremity clubbing/cyanosis [Soft] : abdomen soft [Non-distended] : non-distended [No Masses] : no abdominal mass palpated [Normal Bowel Sounds] : normal bowel sounds [No Joint Swelling] : no joint swelling [Coordination Grossly Intact] : coordination grossly intact [No Focal Deficits] : no focal deficits [Normal Gait] : normal gait [Deep Tendon Reflexes (DTR)] : deep tendon reflexes were 2+ and symmetric [Speech Grossly Normal] : speech grossly normal [Memory Grossly Normal] : memory grossly normal [Normal Affect] : the affect was normal [Alert and Oriented x3] : oriented to person, place, and time [Normal Mood] : the mood was normal [Normal Insight/Judgement] : insight and judgment were intact [Normal] : affect was normal and insight and judgment were intact [Comprehensive Foot Exam Normal] : Right and left foot were examined and both feet are normal. No ulcers in either foot. Toes are normal and with full ROM.  Normal tactile sensation with monofilament testing throughout both feet [de-identified] : Status post abdominal hernia repair healed well. [de-identified] : Back pain has improved [de-identified] : diffuse joint pain without swelling

## 2021-10-26 NOTE — REVIEW OF SYSTEMS
[Fatigue] : fatigue [Abdominal Pain] : abdominal pain [Joint Pain] : joint pain [Joint Stiffness] : joint stiffness [Muscle Pain] : muscle pain [Back Pain] : back pain [Dizziness] : dizziness [Negative] : Heme/Lymph [Chest Pain] : no chest pain [Palpitations] : no palpitations [Leg Claudication] : no leg claudication [Lower Ext Edema] : no lower extremity edema [Orthopnea] : no orthopnea [Nausea] : no nausea [Constipation] : no constipation [Diarrhea] : diarrhea [Vomiting] : no vomiting [Heartburn] : no heartburn [Melena] : no melena [Joint Swelling] : no joint swelling [Muscle Weakness] : no muscle weakness [Headache] : no headache [Fainting] : no fainting [Confusion] : no confusion [Memory Loss] : no memory loss [Unsteady Walking] : no ataxia [FreeTextEntry7] :  rectal bleeding resolved status post hemorrhoidal banding

## 2021-10-26 NOTE — HISTORY OF PRESENT ILLNESS
[FreeTextEntry1] : See HPI. [de-identified] : Patient is a 71-year-old female who presents today for follow-up and disease management patient states that she has been in her usual state of health but she is experiencing dizziness especially with change in position of head especially going from a sitting to a standing position and ambulating patient admits to a subjective sense of movement that resolves when she is standing still.\par \par Patient also has underlying CML, coronary artery disease, hypertension, hyperuricemia, hyperlipidemia, hypothyroidism and chronic renal insufficiency which is stable.  Patient also has underlying peripheral vascular disease.

## 2021-10-27 LAB
ALBUMIN SERPL ELPH-MCNC: 4.1 G/DL
ALP BLD-CCNC: 89 U/L
ALT SERPL-CCNC: 13 U/L
ANION GAP SERPL CALC-SCNC: 12 MMOL/L
AST SERPL-CCNC: 22 U/L
BASOPHILS # BLD AUTO: 0.03 K/UL
BASOPHILS NFR BLD AUTO: 0.6 %
BILIRUB SERPL-MCNC: 0.4 MG/DL
BUN SERPL-MCNC: 30 MG/DL
CALCIUM SERPL-MCNC: 9.6 MG/DL
CHLORIDE SERPL-SCNC: 106 MMOL/L
CHOLEST SERPL-MCNC: 169 MG/DL
CO2 SERPL-SCNC: 24 MMOL/L
CREAT SERPL-MCNC: 1.09 MG/DL
EOSINOPHIL # BLD AUTO: 0.16 K/UL
EOSINOPHIL NFR BLD AUTO: 3.4 %
GLUCOSE SERPL-MCNC: 95 MG/DL
HCT VFR BLD CALC: 33.6 %
HDLC SERPL-MCNC: 64 MG/DL
HGB BLD-MCNC: 10.2 G/DL
IMM GRANULOCYTES NFR BLD AUTO: 0.2 %
LDLC SERPL CALC-MCNC: 93 MG/DL
LYMPHOCYTES # BLD AUTO: 1.16 K/UL
LYMPHOCYTES NFR BLD AUTO: 24.3 %
MAN DIFF?: NORMAL
MCHC RBC-ENTMCNC: 27.6 PG
MCHC RBC-ENTMCNC: 30.4 GM/DL
MCV RBC AUTO: 91.1 FL
MONOCYTES # BLD AUTO: 0.4 K/UL
MONOCYTES NFR BLD AUTO: 8.4 %
NEUTROPHILS # BLD AUTO: 3.01 K/UL
NEUTROPHILS NFR BLD AUTO: 63.1 %
NONHDLC SERPL-MCNC: 106 MG/DL
PLATELET # BLD AUTO: 166 K/UL
POTASSIUM SERPL-SCNC: 4.3 MMOL/L
PROT SERPL-MCNC: 6.8 G/DL
RBC # BLD: 3.69 M/UL
RBC # FLD: 19.8 %
SODIUM SERPL-SCNC: 142 MMOL/L
T4 FREE SERPL-MCNC: 1.5 NG/DL
TRIGL SERPL-MCNC: 62 MG/DL
TSH SERPL-ACNC: 0.39 UIU/ML
WBC # FLD AUTO: 4.77 K/UL

## 2021-11-03 ENCOUNTER — APPOINTMENT (OUTPATIENT)
Dept: CARDIOLOGY | Facility: CLINIC | Age: 71
End: 2021-11-03

## 2021-11-16 ENCOUNTER — APPOINTMENT (OUTPATIENT)
Dept: CARDIOLOGY | Facility: CLINIC | Age: 71
End: 2021-11-16
Payer: MEDICARE

## 2021-11-16 ENCOUNTER — NON-APPOINTMENT (OUTPATIENT)
Age: 71
End: 2021-11-16

## 2021-11-16 VITALS
WEIGHT: 146 LBS | TEMPERATURE: 97.2 F | DIASTOLIC BLOOD PRESSURE: 80 MMHG | BODY MASS INDEX: 28.66 KG/M2 | OXYGEN SATURATION: 99 % | RESPIRATION RATE: 16 BRPM | SYSTOLIC BLOOD PRESSURE: 129 MMHG | HEIGHT: 60 IN | HEART RATE: 59 BPM

## 2021-11-16 VITALS — DIASTOLIC BLOOD PRESSURE: 60 MMHG | HEART RATE: 60 BPM | SYSTOLIC BLOOD PRESSURE: 118 MMHG

## 2021-11-16 PROCEDURE — 99214 OFFICE O/P EST MOD 30 MIN: CPT

## 2021-11-16 PROCEDURE — 93000 ELECTROCARDIOGRAM COMPLETE: CPT

## 2021-11-16 NOTE — REASON FOR VISIT
[Hyperlipidemia] : hyperlipidemia [Hypertension] : hypertension [Coronary Artery Disease] : coronary artery disease [FreeTextEntry1] : Patient returns for followup. Feeling well. Offers no complaints of chest discomfort shortness of breath palpitations dizziness or syncope.Recent lipid profile reveals total cholesterol 169 HDL 64 LDL 93\par

## 2021-11-16 NOTE — ASSESSMENT
[FreeTextEntry1] : Impression:\par 1. Coronary disease status post stenting currently asymptomatic\par 2. Hypertension well controlled\par 3. Hyperlipidemia not quite at goal at this time but reasonably close. Given complex medical regimen and given current treatment for hematologic disorders believe that current regimen is appropriate for her

## 2021-12-01 PROCEDURE — G9005: CPT

## 2021-12-16 NOTE — ASU PREOP CHECKLIST - TEMPERATURE IN FAHRENHEIT (DEGREES F)
Physical Therapy  Facility/Department: [facility] PROGRESSIVE CARE  Daily Treatment Note  NAME: Ramu Dunlap  : 1949  MRN: [id number]    Date of Service: 2021    Discharge Recommendations:  Patient would benefit from continued therapy after discharge        Assessment   Body structures, Functions, Activity limitations: Decreased functional mobility ; Decreased safe awareness; Decreased balance; Decreased strength; Decreased posture; Decreased endurance  Assessment: Pt presents with R pneumothorax s/p chest tubes. Pt demo's balance and endurance deficits, weakness, and needs assist with mobiltiy. Cont per POC to prepare for d/c. Treatment Diagnosis: impaired mobility d/t R pneumothorax  Prognosis: Good  Decision Making: Medium Complexity  History: 27-year-old male history of lung cancer, A. fib with RVR, spontaneous pneumothorax presents for evaluation with pneumothorax on right side detected on routine screening CT exam today. Large pneumothorax with almost entirety of right lung collapse. Patient asymptomatic. Discussed with pulmonology. 31 F Chest tube placed with improvement in PTX on postprocedural x-ray. Will admit to intermediate ICU for pulmonology consult and monitoring. Exam: ROM, MMT, social hx, balance assessment, mobiilty  Clinical Presentation: copoerative  Barriers to Learning: none  REQUIRES PT FOLLOW UP: Yes  Activity Tolerance  Activity Tolerance: Patient limited by endurance     Patient Diagnosis(es): The encounter diagnosis was Chronic pneumothorax.     has a past medical history of DDD (degenerative disc disease), cervical, Gout, Heart murmur, Hyperlipidemia, Hypertension, Lung cancer ([address] MI (myocardial infarction) ([address] Skin cancer, and Wears glasses. has a past surgical history that includes Appendectomy; Tonsillectomy; skin biopsy; skin biopsy; Colonoscopy; Foot surgery (Right); Cardiac catheterization; cyst incision and drainage (Right, 2020);  Forearm surgery (Right, 5/14/2020); knee surgery (Left, 5/14/2020); and IR CHEST TUBE INSERTION (12/13/2021). Restrictions  Restrictions/Precautions  Restrictions/Precautions: General Precautions, Surgical Protocols (chest tube)  Required Braces or Orthoses?: No  Implants present? : Metal implants (cardiac stent. )  Position Activity Restriction  Other position/activity restrictions: Chest Tube  Subjective   General  Chart Reviewed: Yes  Additional Pertinent Hx: Chest tube placement 12/8/21 and 12/13/21 d/t persistent air leak. Pt has h/o R sided pneumothorax Sept 2021 and currently. H/o lung CA  Response To Previous Treatment: Not applicable  Family / Caregiver Present: No  Subjective  Subjective: Pt is in bed upon arrival. PT is agreeable to PT. General Comment  Comments: MARYAM rachel's pt for PT. Pain Screening  Patient Currently in Pain: Yes  Pain Assessment  Pain Assessment: 0-10  Response to Pain Intervention:  (nonpharm utilized)  Vital Signs  Patient Currently in Pain: Yes  Oxygen Therapy  O2 Device: None (Room air)  Patient Observation  Observations: calm  Pre Treatment Pain Screening  Intervention List: Patient able to continue with treatment    Orientation  Orientation  Overall Orientation Status: Within Functional Limits   Objective   Bed mobility  Supine to Sit: Modified independent  Sit to Supine: Modified independent  Scooting: Modified independent  Comment: No difficulties noted  Transfers  Sit to Stand: Supervision  Stand to sit: Supervision  Comment: Pt requires min cues for eccentric control. Ambulation  Ambulation?: Yes  More Ambulation?: No  Ambulation 1  Surface: level tile  Device: No Device  Assistance: Stand by assistance  Quality of Gait: no LOB, slightly unsteady.    Distance: 4'x2  Comments: limited by chest tubes connected to wall suction  Stairs/Curb  Stairs?: Yes  Stairs  # Steps : 10 (x2)  Stairs Height: 8\"  Rails: Left ascending  Device: No Device  Assistance: Contact guard assistance; 97.7 Minimal assistance  Comment: Pt is slighlty unsteady without UE support. Improved stability with UE support. Wheelchair Activities  Wheelchair Parts Management: No  Propulsion: No     Balance  Posture: Fair  Sitting - Static: Fair; +  Sitting - Dynamic: Fair; +  Standing - Static: Fair  Standing - Dynamic: Fair  Comments: no device  Other exercises  Other exercises?: Yes  Other exercises 1: Seated BLE ex's. AROM and green thera band. Reps: 15 each. Comment: rest breaks PRN.          Goals  Short term goals  Time Frame for Short term goals: 1 week  Short term goal 1: Pt will increase LE strength to 5/5  Short term goal 2: Ptwill increase endurance to tolerate PT sessions for 30 mins  Short term goal 3: Pt will improve standing and ambulation balance to good  Short term goal 4: Pt will perform bed mobility IND  Short term goal 5: Ptwill transfer with least restrictive device MOD IND  Short term goal 6: Pt will ambulate with least restrictive device 50 ft MOD IND  Short term goal 7: Pt will ascend/descend 5 steps without rail SBA    Plan    Plan  Times per week: 6-7x/wk  Times per day: Daily  Current Treatment Recommendations: Strengthening, Transfer Training, Endurance Training, Patient/Caregiver Education & Training, Balance Training, Functional Mobility Training, Stair training, Safety Education & Training, Gait Training, Home Exercise Program, Equipment Evaluation, Education, & procurement  Safety Devices  Type of devices: Call light within reach, Left in chair, Nurse notified, Patient at risk for falls   Restraints  Initially in place: No     Therapy Time   Individual Concurrent Group Co-treatment   Time In 1358         Time Out 1430         Minutes [address] [place] [address]

## 2022-01-10 ENCOUNTER — APPOINTMENT (OUTPATIENT)
Dept: FAMILY MEDICINE | Facility: CLINIC | Age: 72
End: 2022-01-10

## 2022-02-17 ENCOUNTER — INPATIENT (INPATIENT)
Facility: HOSPITAL | Age: 72
LOS: 6 days | Discharge: HOME | DRG: 287 | End: 2022-02-24
Attending: HOSPITALIST | Admitting: HOSPITALIST
Payer: MEDICARE

## 2022-02-17 VITALS
SYSTOLIC BLOOD PRESSURE: 116 MMHG | WEIGHT: 143.96 LBS | HEIGHT: 60 IN | HEART RATE: 57 BPM | RESPIRATION RATE: 18 BRPM | DIASTOLIC BLOOD PRESSURE: 54 MMHG | OXYGEN SATURATION: 95 % | TEMPERATURE: 98 F

## 2022-02-17 DIAGNOSIS — Z98.890 OTHER SPECIFIED POSTPROCEDURAL STATES: Chronic | ICD-10-CM

## 2022-02-17 DIAGNOSIS — Z98.89 OTHER SPECIFIED POSTPROCEDURAL STATES: Chronic | ICD-10-CM

## 2022-02-17 DIAGNOSIS — K46.9 UNSPECIFIED ABDOMINAL HERNIA WITHOUT OBSTRUCTION OR GANGRENE: Chronic | ICD-10-CM

## 2022-02-17 DIAGNOSIS — Z96.653 PRESENCE OF ARTIFICIAL KNEE JOINT, BILATERAL: Chronic | ICD-10-CM

## 2022-02-17 LAB
ALBUMIN SERPL ELPH-MCNC: 3.9 G/DL — SIGNIFICANT CHANGE UP (ref 3.3–5)
ALP SERPL-CCNC: 63 U/L — SIGNIFICANT CHANGE UP (ref 40–120)
ALT FLD-CCNC: 9 U/L — LOW (ref 10–45)
ANION GAP SERPL CALC-SCNC: 13 MMOL/L — SIGNIFICANT CHANGE UP (ref 5–17)
APTT BLD: 30.3 SEC — SIGNIFICANT CHANGE UP (ref 27.5–35.5)
AST SERPL-CCNC: 21 U/L — SIGNIFICANT CHANGE UP (ref 10–40)
BASOPHILS # BLD AUTO: 0.03 K/UL — SIGNIFICANT CHANGE UP (ref 0–0.2)
BASOPHILS NFR BLD AUTO: 0.7 % — SIGNIFICANT CHANGE UP (ref 0–2)
BILIRUB SERPL-MCNC: 0.4 MG/DL — SIGNIFICANT CHANGE UP (ref 0.2–1.2)
BUN SERPL-MCNC: 30 MG/DL — HIGH (ref 7–23)
CALCIUM SERPL-MCNC: 9 MG/DL — SIGNIFICANT CHANGE UP (ref 8.4–10.5)
CHLORIDE SERPL-SCNC: 106 MMOL/L — SIGNIFICANT CHANGE UP (ref 96–108)
CO2 SERPL-SCNC: 24 MMOL/L — SIGNIFICANT CHANGE UP (ref 22–31)
CREAT SERPL-MCNC: 1.16 MG/DL — SIGNIFICANT CHANGE UP (ref 0.5–1.3)
EOSINOPHIL # BLD AUTO: 0.18 K/UL — SIGNIFICANT CHANGE UP (ref 0–0.5)
EOSINOPHIL NFR BLD AUTO: 3.9 % — SIGNIFICANT CHANGE UP (ref 0–6)
GLUCOSE SERPL-MCNC: 109 MG/DL — HIGH (ref 70–99)
HCT VFR BLD CALC: 31.8 % — LOW (ref 34.5–45)
HGB BLD-MCNC: 9.8 G/DL — LOW (ref 11.5–15.5)
INR BLD: 0.91 RATIO — SIGNIFICANT CHANGE UP (ref 0.88–1.16)
LYMPHOCYTES # BLD AUTO: 1.86 K/UL — SIGNIFICANT CHANGE UP (ref 1–3.3)
LYMPHOCYTES # BLD AUTO: 40.3 % — SIGNIFICANT CHANGE UP (ref 13–44)
MAGNESIUM SERPL-MCNC: 1.7 MG/DL — SIGNIFICANT CHANGE UP (ref 1.6–2.6)
MCHC RBC-ENTMCNC: 30.5 PG — SIGNIFICANT CHANGE UP (ref 27–34)
MCHC RBC-ENTMCNC: 30.8 GM/DL — LOW (ref 32–36)
MCV RBC AUTO: 99.1 FL — SIGNIFICANT CHANGE UP (ref 80–100)
MONOCYTES # BLD AUTO: 0.49 K/UL — SIGNIFICANT CHANGE UP (ref 0–0.9)
MONOCYTES NFR BLD AUTO: 10.6 % — SIGNIFICANT CHANGE UP (ref 2–14)
NEUTROPHILS # BLD AUTO: 2.05 K/UL — SIGNIFICANT CHANGE UP (ref 1.8–7.4)
NEUTROPHILS NFR BLD AUTO: 44.5 % — SIGNIFICANT CHANGE UP (ref 43–77)
NRBC # BLD: 0 /100 WBCS — SIGNIFICANT CHANGE UP (ref 0–0)
PLATELET # BLD AUTO: 129 K/UL — LOW (ref 150–400)
POTASSIUM SERPL-MCNC: 4.2 MMOL/L — SIGNIFICANT CHANGE UP (ref 3.5–5.3)
POTASSIUM SERPL-SCNC: 4.2 MMOL/L — SIGNIFICANT CHANGE UP (ref 3.5–5.3)
PROT SERPL-MCNC: 6.5 G/DL — SIGNIFICANT CHANGE UP (ref 6–8.3)
PROTHROM AB SERPL-ACNC: 11 SEC — SIGNIFICANT CHANGE UP (ref 10.6–13.6)
RBC # BLD: 3.21 M/UL — LOW (ref 3.8–5.2)
RBC # FLD: 14.6 % — HIGH (ref 10.3–14.5)
SARS-COV-2 RNA SPEC QL NAA+PROBE: SIGNIFICANT CHANGE UP
SODIUM SERPL-SCNC: 143 MMOL/L — SIGNIFICANT CHANGE UP (ref 135–145)
TROPONIN T, HIGH SENSITIVITY RESULT: 28 NG/L — SIGNIFICANT CHANGE UP (ref 0–51)
TROPONIN T, HIGH SENSITIVITY RESULT: 29 NG/L — SIGNIFICANT CHANGE UP (ref 0–51)
WBC # BLD: 4.61 K/UL — SIGNIFICANT CHANGE UP (ref 3.8–10.5)
WBC # FLD AUTO: 4.61 K/UL — SIGNIFICANT CHANGE UP (ref 3.8–10.5)

## 2022-02-17 PROCEDURE — 71045 X-RAY EXAM CHEST 1 VIEW: CPT | Mod: 26

## 2022-02-17 PROCEDURE — 99220: CPT | Mod: CS

## 2022-02-17 RX ORDER — DILTIAZEM HCL 120 MG
240 CAPSULE, EXT RELEASE 24 HR ORAL DAILY
Refills: 0 | Status: DISCONTINUED | OUTPATIENT
Start: 2022-02-17 | End: 2022-02-18

## 2022-02-17 RX ORDER — ASPIRIN/CALCIUM CARB/MAGNESIUM 324 MG
81 TABLET ORAL DAILY
Refills: 0 | Status: DISCONTINUED | OUTPATIENT
Start: 2022-02-17 | End: 2022-02-24

## 2022-02-17 RX ORDER — ASPIRIN/CALCIUM CARB/MAGNESIUM 324 MG
1 TABLET ORAL
Qty: 0 | Refills: 0 | DISCHARGE

## 2022-02-17 RX ORDER — ALLOPURINOL 300 MG
300 TABLET ORAL DAILY
Refills: 0 | Status: DISCONTINUED | OUTPATIENT
Start: 2022-02-17 | End: 2022-02-24

## 2022-02-17 RX ORDER — FAMOTIDINE 10 MG/ML
20 INJECTION INTRAVENOUS ONCE
Refills: 0 | Status: COMPLETED | OUTPATIENT
Start: 2022-02-17 | End: 2022-02-17

## 2022-02-17 RX ORDER — GABAPENTIN 400 MG/1
400 CAPSULE ORAL
Refills: 0 | Status: DISCONTINUED | OUTPATIENT
Start: 2022-02-17 | End: 2022-02-18

## 2022-02-17 RX ORDER — ATORVASTATIN CALCIUM 80 MG/1
10 TABLET, FILM COATED ORAL DAILY
Refills: 0 | Status: DISCONTINUED | OUTPATIENT
Start: 2022-02-17 | End: 2022-02-24

## 2022-02-17 RX ORDER — LEVOTHYROXINE SODIUM 125 MCG
125 TABLET ORAL DAILY
Refills: 0 | Status: DISCONTINUED | OUTPATIENT
Start: 2022-02-17 | End: 2022-02-24

## 2022-02-17 RX ORDER — FUROSEMIDE 40 MG
20 TABLET ORAL DAILY
Refills: 0 | Status: DISCONTINUED | OUTPATIENT
Start: 2022-02-17 | End: 2022-02-24

## 2022-02-17 RX ORDER — VALACYCLOVIR 500 MG/1
1 TABLET, FILM COATED ORAL
Qty: 0 | Refills: 0 | DISCHARGE

## 2022-02-17 RX ADMIN — Medication 30 MILLILITER(S): at 17:54

## 2022-02-17 RX ADMIN — FAMOTIDINE 20 MILLIGRAM(S): 10 INJECTION INTRAVENOUS at 18:48

## 2022-02-17 NOTE — ED PROVIDER NOTE - PROGRESS NOTE DETAILS
Windy Weeks MD (PGY2) -  Dr. Gabriel from cardiology recommends EST tomorrow. Patient's primary cardiologist Dr. Pacheco. Sent to ED by Dr. Gabriel. Hospitalist paged

## 2022-02-17 NOTE — ED CLERICAL - NS ED CLERK NOTE PRE-ARRIVAL INFORMATION; ADDITIONAL PRE-ARRIVAL INFORMATION
CC/Reason For referral: chest pain angio need   Preferred Consultant(if applicable):  Who admits for you (if needed):hospitalist Calvillo  Do you have documents you would like to fax over? no  Would you still like to speak to an ED attending?[phone]

## 2022-02-17 NOTE — ED CDU PROVIDER INITIAL DAY NOTE - OBJECTIVE STATEMENT
72 yo F PMHx CAD s/p stent x2 in Nov 2019, HTN, HLD, OA, hypothyroidism, mild gout, hx of remote PE 2/2 R knee surgery, remote rheumatic fever, leukemia on oral chemo, presenting to ED for "few days" of left sided chest pain, without radiation, with improving pain since. Also endorses "gagging up" clear phlegm last night but denies cough, congestion, or fevers. Able to eat breakfast this morning. Denies SOB, lightheadedness, palpitations, sweating, N/V, or swelling in legs. 72 yo F PMHx CAD s/p stent in Nov 2019, HTN, HLD, OA, hypothyroidism, mild gout, hx of PE in 1974 2/2 R knee surgery, rheumatic fever, chronic form of leukemia per pt, presenting to ED for "few days" of left sided CP radiating to left arm. Associated "gagging up" clear phlegm but denies cough, congestion, or fevers. Denies SOB, lightheadedness, palpitations, sweating, N/V, or swelling in legs. Took 81 mg ASA earlier today. States last time she had pain was this morning. 70 yo F PMHx CAD s/p stent x2 in Nov 2019, HTN, HLD, OA, hypothyroidism, mild gout, hx of remote PE 2/2 R knee surgery, remote rheumatic fever, leukemia on oral chemo, presenting to ED for "few days" of left sided chest pain, without radiation, with improving pain since. Also endorses "gagging up" clear phlegm last night but denies cough, congestion, or fevers. Able to eat breakfast this morning. Denies SOB, lightheadedness, palpitations, sweating, N/V, or swelling in legs.   ED course: Trop 28-29. ED team discussed case with patient's cardiologist, plan for stress test in CDU.

## 2022-02-17 NOTE — ED CDU PROVIDER INITIAL DAY NOTE - NS ED ROS FT
Constitutional: No fever or chills  Eyes: No visual changes, eye pain   CV: + chest pain No lower extremity edema  Resp: No SOB no cough  GI: No abd pain. No nausea or vomiting. No diarrhea.  : No dysuria, hematuria.   MSK: No musculoskeletal pain  Skin: No rash  Psych: No complaints   Neuro: No headache. No numbness or tingling. No weakness.  Endo: No DM

## 2022-02-17 NOTE — ED PROVIDER NOTE - OBJECTIVE STATEMENT
70 yo F PMHx CAD s/p stent in Nov 2019, HTN, HLD, OA, hypothyroidism, mild gout, hx of PE in 1974 2/2 R knee surgery, rheumatic fever, chronic form of leukemia per pt, presenting to ED for "few days" of left sided CP radiating to left arm. Associated "gagging up" clear phlegm but denies cough, congestion, or fevers. Denies SOB, lightheadedness, palpitations, sweating, N/V, or swelling in legs. Took 81 mg ASA earlier today. States last time she had pain was this morning.

## 2022-02-17 NOTE — ED ADULT NURSE NOTE - OBJECTIVE STATEMENT
71y female with hx of hld, htn, 2 cardiac stents presents to the ER c/o cp. pt is alert and oriented x 4 and speaking coherently. pt states x 3 days she has had intermittent cp- sometimes worse with eating. pt did not have nausea, sob, diaphoresis, dizziness w cp. pt states the pain was localized to left chest. pt concerned as she has needed stents last year for a condition that she cannot recall-- but from which 3 of her sisters  from. pt placed on cm, ekg completed. md at beside. will reassess.

## 2022-02-17 NOTE — ED PROVIDER NOTE - NS ED ROS FT
Gen: Denies fever.   HEENT: Denies headache. Denies congestion.  CV: Denies chest pain. Denies lightheadedness.  Skin: Denies rash.   Resp: Denies SOB. Denies cough.  GI: Denies abd pain. Denies nausea. Denies vomiting. Denies diarrhea. Denies melena. Denies hematochezia.  Msk: Denies extremity swelling. Denies extremity pain.  : Denies dysuria. Denies hematuria.  Neuro: Denies LOC. Denies dizziness. Denies new numbness/tingling. Denies new focal weakness.  Psych: Denies SI Gen: Denies fever.   HEENT: Denies headache. Denies congestion.  CV: +chest pain. Denies lightheadedness.  Skin: Denies rash.   Resp: Denies SOB. Denies cough.  GI: Denies abd pain. Denies nausea. Denies vomiting. Denies diarrhea. Denies melena. Denies hematochezia.  Msk: Denies extremity swelling. Denies extremity pain.  : Denies dysuria. Denies hematuria.  Neuro: Denies LOC. Denies dizziness. Denies new numbness/tingling. Denies new focal weakness.  Psych: Denies SI

## 2022-02-17 NOTE — ED CDU PROVIDER INITIAL DAY NOTE - ATTENDING CONTRIBUTION TO CARE
I Victor M Storm MD have personally performed a face to face diagnostic evaluation on this patient.  I have reviewed the ACP note and agree with the history, exam, and plan of care, except as noted.   My medical decison making and observations are found above.  The patient is stable for CDU.

## 2022-02-17 NOTE — ED ADULT NURSE REASSESSMENT NOTE - NS ED NURSE REASSESS COMMENT FT1
No shortness of breath or difficulty breathing. No chest pain, pressure or palpitations. No abdominal pain, nausea or vomiting. Pt able to ambulate independently and safely, with out increase in pain or dizziness.

## 2022-02-17 NOTE — ED PROVIDER NOTE - CLINICAL SUMMARY MEDICAL DECISION MAKING FREE TEXT BOX
Emeterio: significant cardiac hx with chest pains for the last few days. Non now. Also coughing up phem occationally. Some gerd sx. Would do cardiac work up as she represents highest risk.

## 2022-02-17 NOTE — ED CDU PROVIDER INITIAL DAY NOTE - PROGRESS NOTE DETAILS
ED team states that they spoke with on call provider for Dr. Pacheco, who was OK with stress test.     During my evaluation for CDU patient with L chest "heaviness" without radiation, without SOB or diaphoresis. Patient well appearing. NAD. Sinus bradycardia on monitor. Pain reproducible to palpation. Repeat EKG performed and evaluated with Dr. Storm. No acute change in plan. Will continue with stress test tomorrow. - Bren Bassett PA-C I spoke with patient's oncologist Dr. Bradley Watson. States that patient is taking Tasigna, has been on medication for 1.5 years with stable CML. States that patient has had EKG/Echo recently (has every 6 months), as medication can cause heart failure. States that it is ok for patient not to take medication tonight and can resume tomorrow. Saw patient in office and recommended follow up with her cardiologist.   I spoke with on call oncology fellow, asking if patient would need clearance to take med in CDU. State that medication can cause prolong QT and in the setting of chest pain workup could potentially hold at this time. If admitted would need reevaluation. - Bren Bassett PA-C

## 2022-02-17 NOTE — ED PROVIDER NOTE - PHYSICAL EXAMINATION
Gen: A&Ox4   HEENT: Atraumatic. Mucous membranes moist, no scleral icterus.  CV: bradycardic. No significant lower extremity edema.   Resp: Respirations unlabored. CTAB, no rales, no wheezes.  GI: Abdomen mildly diffusely ttp (chronci per pt), soft and non-distended.   Skin/MSK: No open wounds. No ecchymosis appreciated.  Neuro: Following commands. No facial drop.   Psych: Appropriate mood, cooperative

## 2022-02-17 NOTE — ED CDU PROVIDER INITIAL DAY NOTE - PHYSICAL EXAMINATION
GEN: Well Appearing, Nontoxic, NAD  HEENT: NC/AT, Symm Facies.   CV: No JVD/Bruits or stridor;  +S1S2, RRR w/o m/g/r, +pain reproducible to palpation L side chest   RESP: CTAB w/o w/r/r  ABD: Soft, nt/nd, +BS. No guarding/rebound.   EXT/MSK: No lower extremity edema or calf tenderness. +palpable radial pulses. FROMx4  PSYCH: Appropriate mood and affect   Neuro: Grossly intact, AOX3 with normal speech, steady gait

## 2022-02-17 NOTE — ED CDU PROVIDER INITIAL DAY NOTE - MEDICAL DECISION MAKING DETAILS
Emeterio: pt with lt sided cp. Hx of CAD. higher HEART score. Would get provacative testing and monitor. Looks well now and is not having active chest pain

## 2022-02-17 NOTE — ED PROVIDER NOTE - CARE PLAN
Detail Level: Generalized Detail Level: Zone Detail Level: Detailed 1 Principal Discharge DX:	Chest pain

## 2022-02-18 ENCOUNTER — RX RENEWAL (OUTPATIENT)
Age: 72
End: 2022-02-18

## 2022-02-18 DIAGNOSIS — R07.9 CHEST PAIN, UNSPECIFIED: ICD-10-CM

## 2022-02-18 DIAGNOSIS — I10 ESSENTIAL (PRIMARY) HYPERTENSION: ICD-10-CM

## 2022-02-18 DIAGNOSIS — E03.9 HYPOTHYROIDISM, UNSPECIFIED: ICD-10-CM

## 2022-02-18 DIAGNOSIS — Z29.9 ENCOUNTER FOR PROPHYLACTIC MEASURES, UNSPECIFIED: ICD-10-CM

## 2022-02-18 DIAGNOSIS — E78.5 HYPERLIPIDEMIA, UNSPECIFIED: ICD-10-CM

## 2022-02-18 LAB
A1C WITH ESTIMATED AVERAGE GLUCOSE RESULT: 5.8 % — HIGH (ref 4–5.6)
CHOLEST SERPL-MCNC: 140 MG/DL — SIGNIFICANT CHANGE UP
ESTIMATED AVERAGE GLUCOSE: 120 MG/DL — HIGH (ref 68–114)
HDLC SERPL-MCNC: 64 MG/DL — SIGNIFICANT CHANGE UP
LIPID PNL WITH DIRECT LDL SERPL: 67 MG/DL — SIGNIFICANT CHANGE UP
NON HDL CHOLESTEROL: 76 MG/DL — SIGNIFICANT CHANGE UP
TRIGL SERPL-MCNC: 44 MG/DL — SIGNIFICANT CHANGE UP
TROPONIN T, HIGH SENSITIVITY RESULT: 27 NG/L — SIGNIFICANT CHANGE UP (ref 0–51)

## 2022-02-18 PROCEDURE — 99223 1ST HOSP IP/OBS HIGH 75: CPT

## 2022-02-18 PROCEDURE — 99217: CPT | Mod: FS

## 2022-02-18 PROCEDURE — 93306 TTE W/DOPPLER COMPLETE: CPT | Mod: 26

## 2022-02-18 PROCEDURE — 99233 SBSQ HOSP IP/OBS HIGH 50: CPT

## 2022-02-18 RX ORDER — LANOLIN ALCOHOL/MO/W.PET/CERES
3 CREAM (GRAM) TOPICAL AT BEDTIME
Refills: 0 | Status: DISCONTINUED | OUTPATIENT
Start: 2022-02-18 | End: 2022-02-24

## 2022-02-18 RX ORDER — MAGNESIUM SULFATE 500 MG/ML
2 VIAL (ML) INJECTION ONCE
Refills: 0 | Status: COMPLETED | OUTPATIENT
Start: 2022-02-18 | End: 2022-02-18

## 2022-02-18 RX ORDER — GABAPENTIN 400 MG/1
800 CAPSULE ORAL ONCE
Refills: 0 | Status: COMPLETED | OUTPATIENT
Start: 2022-02-18 | End: 2022-02-18

## 2022-02-18 RX ORDER — ACETAMINOPHEN 500 MG
650 TABLET ORAL EVERY 6 HOURS
Refills: 0 | Status: DISCONTINUED | OUTPATIENT
Start: 2022-02-18 | End: 2022-02-24

## 2022-02-18 RX ORDER — INFLUENZA VIRUS VACCINE 15; 15; 15; 15 UG/.5ML; UG/.5ML; UG/.5ML; UG/.5ML
0.7 SUSPENSION INTRAMUSCULAR ONCE
Refills: 0 | Status: DISCONTINUED | OUTPATIENT
Start: 2022-02-18 | End: 2022-02-24

## 2022-02-18 RX ORDER — HEPARIN SODIUM 5000 [USP'U]/ML
5000 INJECTION INTRAVENOUS; SUBCUTANEOUS EVERY 8 HOURS
Refills: 0 | Status: DISCONTINUED | OUTPATIENT
Start: 2022-02-18 | End: 2022-02-24

## 2022-02-18 RX ORDER — OXYCODONE HYDROCHLORIDE 5 MG/1
15 TABLET ORAL ONCE
Refills: 0 | Status: DISCONTINUED | OUTPATIENT
Start: 2022-02-18 | End: 2022-02-18

## 2022-02-18 RX ORDER — LOVASTATIN 20 MG
1 TABLET ORAL
Qty: 0 | Refills: 0 | DISCHARGE

## 2022-02-18 RX ORDER — GABAPENTIN 400 MG/1
400 CAPSULE ORAL
Refills: 0 | Status: DISCONTINUED | OUTPATIENT
Start: 2022-02-18 | End: 2022-02-24

## 2022-02-18 RX ADMIN — GABAPENTIN 800 MILLIGRAM(S): 400 CAPSULE ORAL at 00:38

## 2022-02-18 RX ADMIN — Medication 125 MICROGRAM(S): at 06:57

## 2022-02-18 RX ADMIN — Medication 650 MILLIGRAM(S): at 22:00

## 2022-02-18 RX ADMIN — GABAPENTIN 800 MILLIGRAM(S): 400 CAPSULE ORAL at 15:21

## 2022-02-18 RX ADMIN — Medication 300 MILLIGRAM(S): at 13:38

## 2022-02-18 RX ADMIN — Medication 2.5 MILLIGRAM(S): at 17:38

## 2022-02-18 RX ADMIN — ATORVASTATIN CALCIUM 10 MILLIGRAM(S): 80 TABLET, FILM COATED ORAL at 00:38

## 2022-02-18 RX ADMIN — GABAPENTIN 400 MILLIGRAM(S): 400 CAPSULE ORAL at 21:22

## 2022-02-18 RX ADMIN — Medication 25 GRAM(S): at 22:20

## 2022-02-18 RX ADMIN — HEPARIN SODIUM 5000 UNIT(S): 5000 INJECTION INTRAVENOUS; SUBCUTANEOUS at 21:22

## 2022-02-18 RX ADMIN — Medication 650 MILLIGRAM(S): at 21:24

## 2022-02-18 RX ADMIN — Medication 240 MILLIGRAM(S): at 13:38

## 2022-02-18 RX ADMIN — Medication 20 MILLIGRAM(S): at 13:38

## 2022-02-18 RX ADMIN — Medication 81 MILLIGRAM(S): at 13:37

## 2022-02-18 RX ADMIN — OXYCODONE HYDROCHLORIDE 15 MILLIGRAM(S): 5 TABLET ORAL at 15:20

## 2022-02-18 NOTE — ED CDU PROVIDER SUBSEQUENT DAY NOTE - HISTORY
No interval changes since initial CDU provider note. Pt feels well without complaint. NAD VSS. no events on tele-sinus bradycardia. Plan for stress test in the setting of chest pain, improving.  - AMAN Bassett

## 2022-02-18 NOTE — ED CDU PROVIDER SUBSEQUENT DAY NOTE - PROGRESS NOTE DETAILS
Attending MD Hernandez: Received call from Dr. Jose Zhang requesting update on patient.   Patient reporting persistent L sided pressure like pain.  Dr. Zhang reports patient stress should be cancelled as will not stress with active pain and recommends touching base with patient's cardiologist, Dr. Pacheco.  Will cancel stress order at cards' request and reach out to Dr. Pacheco. spoke to covering cardiologist for Dr. Pacheco which was Dr. Maharaj, recommends cardiac cath. discussed with Dr. Hernandez, pt admitted for further management. house cardiology called per hospitalists request. -Martha Reyna PA-C

## 2022-02-18 NOTE — H&P ADULT - HISTORY OF PRESENT ILLNESS
71F PMH CAD s/p JAC to mCX and JAC to RCA (Nov 2019), HTN, HLD, hypothyroid, history of PE in 1974 after knee surgery, remote rheumatic fever, CML p/w left-sided chest pain since sunday. Reports it is localized to the left chest, occurred at rest, lasting for a few minutes, intermittent several times per day.  She last experienced chest pain yesterday when she came to the ED and currently denies chest pain, though endorses feeling chest pressure this morning. She denies SOB, palpitations, or dizziness. Denies nausea, vomiting, melena, hematochezia, hematuria.

## 2022-02-18 NOTE — CONSULT NOTE ADULT - ASSESSMENT
Incomplete note 71F PMH CAD s/p JAC to mCX and JAC to RCA (Nov 2019), HTN, HLD, history of PE in 1974 after knee surgery, remote rheumatic fever, CML on Tasigna who p/w intermittent left-sided chest pain for several days. No active chest pain, negative troponin, EKG unlikely ACS. PE unlikely given bradycardia and normal O2 saturation. Unclear etiology of chest pain, possibly cardiac.     Recommendations:    1. Chest pain  - Nuclear stress test tomorrow  - if abnormal nuclear stress test will discuss with interventional cardiology  - monitor on tele  - EKG new TWI but negative troponin, no active chest pain not ACS  - TTE (2/18/22): EF 65%, no wall motion abnormalities    2. CML  - recommend holding Tasigna until chest pain further evaluated due to its association with thrombotic events    3. HTN  - d/c diltiazem for now due to unclear reason patient is taking it  - c/w Enalapril 2.5mg qd  - c/w Furosemide 20mg PO qd    4. CAD  - c/w aspirin 81mg qd    5. HLD  - c/w atorvastatin 10mg qd  - lipids wnl  - A1c 5.8 71F PMH CAD s/p JAC to mCX and JAC to RCA (Nov 2019), HTN, HLD, history of PE in 1974 after knee surgery, remote rheumatic fever, CML on Tasigna who p/w intermittent left-sided chest pain for several days. No active chest pain, negative troponin, EKG unlikely ACS. PE unlikely given bradycardia and normal O2 saturation. Unclear etiology of chest pain, possibly cardiac.     Recommendations:    1. Chest pain  - Nuclear stress test tomorrow  - if abnormal nuclear stress test will discuss with interventional cardiology  - monitor on tele  - EKG new TWI but negative troponin, no active chest pain not ACS  - TTE (2/18/22): EF 65%, no wall motion abnormalities    2. CML  - recommend holding Tasigna until chest pain further evaluated due to its association with thrombotic events  - patient received last dose of Tasigna 2/18 AM    3. HTN  - d/c diltiazem for now due to unclear reason patient is taking it  - c/w Enalapril 2.5mg qd  - c/w Furosemide 20mg PO qd    4. CAD  - c/w aspirin 81mg qd    5. HLD  - c/w atorvastatin 10mg qd  - lipids wnl  - A1c 5.8 71F PMH CAD s/p JAC to mCX and JAC to RCA (Nov 2019), HTN, HLD, history of PE in 1974 after knee surgery, remote rheumatic fever, CML on Tasigna who p/w intermittent left-sided chest pain for several days. No active chest pain, negative troponin, EKG unlikely ACS. PE unlikely given bradycardia and normal O2 saturation. Unclear etiology of chest pain, possibly cardiac.     Recommendations:    1. Chest pain  - Nuclear stress test tomorrow  - if abnormal nuclear stress test will discuss with interventional cardiology  - obtain repeat troponin  - monitor on tele  - EKG new TWI but negative troponin, no active chest pain not ACS  - TTE (2/18/22): EF 65%, no wall motion abnormalities    2. CML  - recommend holding Tasigna until chest pain further evaluated due to its association with thrombotic events  - patient received last dose of Tasigna 2/18 AM    3. HTN  - d/c diltiazem for now due to unclear reason patient is taking it  - c/w Enalapril 2.5mg qd  - c/w Furosemide 20mg PO qd    4. CAD  - c/w aspirin 81mg qd    5. HLD  - c/w atorvastatin 10mg qd  - lipids wnl  - A1c 5.8 71F PMH CAD s/p JAC to mCX and JAC to RCA (Nov 2019), HTN, HLD, history of PE in 1974 after knee surgery, remote rheumatic fever, CML on Tasigna who p/w intermittent left-sided chest pain for several days. No active chest pain, negative troponin, EKG unlikely ACS. PE unlikely given bradycardia and normal O2 saturation. Unclear etiology of chest pain, possibly cardiac.     Recommendations:    1. Chest pain  - Nuclear stress test tomorrow  - if abnormal nuclear stress test will discuss with interventional cardiology  - obtain repeat troponin  - monitor on tele  - EKG sinus guille with new TWI but negative troponin, no active chest pain not ACS  - TTE (2/18/22): EF 65%, no wall motion abnormalities    2. CML  - recommend holding Tasigna until chest pain further evaluated due to its association with thrombotic events  - patient received last dose of Tasigna 2/18 AM    3. HTN  - d/c diltiazem for now due to unclear reason patient is taking it  - c/w Enalapril 2.5mg qd  - c/w Furosemide 20mg PO qd    4. CAD  - c/w aspirin 81mg qd    5. HLD  - c/w atorvastatin 10mg qd  - lipids wnl  - A1c 5.8 71F PMH CAD s/p JAC to mCX and JAC to RCA (Nov 2019), HTN, HLD, history of PE in 1974 after knee surgery, remote rheumatic fever, CML on Tasigna (nilotinib, bcr-Abl TKI) who p/w intermittent left-sided chest pain for several days. No active chest pain, negative troponin, EKG unlikely ACS. PE unlikely given bradycardia and normal O2 saturation.     Recommendations:    1. Chest pain  - Nuclear stress test tomorrow  - if abnormal nuclear stress test will discuss with interventional cardiology  - obtain repeat troponin  - monitor on tele  - EKG sinus guille with new TWI but negative troponin, no active chest pain not ACS  - TTE (2/18/22): EF 65%, no wall motion abnormalities    2. CML  - recommend holding Tasigna until chest pain further evaluated due to its association with thrombotic events  - patient received last dose of Tasigna 2/18 AM    3. HTN  - hold diltiazem given potential interaction with nilotinib  -  increase Enalapril to 5 mg daily  - continueFurosemide 20mg PO qd    4. CAD  - c/w aspirin 81mg qd  - continue statin    5. HLD  - c/w atorvastatin 10mg qd  - lipids wnl  - A1c 5.8

## 2022-02-18 NOTE — ED CDU PROVIDER SUBSEQUENT DAY NOTE - NS ED ATTENDING STATEMENT MOD
This was a shared visit with the [facility]. I reviewed and verified the documentation and independently performed the documented:

## 2022-02-18 NOTE — ED ADULT NURSE REASSESSMENT NOTE - NS ED NURSE REASSESS COMMENT FT1
Pt received from NELI Stokes. Pt oriented to CDU & plan of care was discussed. Pt endorses minimal discomfort described as pressure. Pt states discomfort is significantly improved from previously. Pt does not want any pain meds at this time. Pt denies any SOB, dizziness or palpitations. Safety & comfort measures maintained. Call bell in reach. Will continue to monitor. Pt received from NELI Stokes. Pt oriented to CDU & plan of care was discussed. Pt endorses minimal discomfort described as pressure. Pt states discomfort is significantly improved from previously. Pt also states pain is intermittent. Pt does not want any pain meds at this time. Pt denies any SOB, dizziness or palpitations. Safety & comfort measures maintained. Call bell in reach. Will continue to monitor.

## 2022-02-18 NOTE — ED CDU PROVIDER DISPOSITION NOTE - CLINICAL COURSE
70 yo F PMHx CAD s/p stent x2 in Nov 2019, HTN, HLD, OA, hypothyroidism, mild gout, hx of remote PE 2/2 R knee surgery, remote rheumatic fever, leukemia on oral chemo, presenting to ED for "few days" of left sided chest pain, without radiation, with improving pain since. Also endorses "gagging up" clear phlegm last night but denies cough, congestion, or fevers. Able to eat breakfast this morning. Denies SOB, lightheadedness, palpitations, sweating, N/V, or swelling in legs.   ED course: Trop 28-29. ED team discussed case with patient's cardiologist, plan for stress test in CDU. 72 yo F PMHx CAD s/p stent x2 in Nov 2019, HTN, HLD, OA, hypothyroidism, mild gout, hx of remote PE 2/2 R knee surgery, remote rheumatic fever, leukemia on oral chemo, presenting to ED for "few days" of left sided chest pain, without radiation, with improving pain since. Also endorses "gagging up" clear phlegm last night but denies cough, congestion, or fevers. Able to eat breakfast this morning. Denies SOB, lightheadedness, palpitations, sweating, N/V, or swelling in legs.   ED course: Trop 28-29. ED team discussed case with patient's cardiologist, plan for stress test in CDU.  pt unable to have stress test due to continued chest pressure. pt admitted for cardiac cath.

## 2022-02-18 NOTE — CONSULT NOTE ADULT - SUBJECTIVE AND OBJECTIVE BOX
HPI: 71F PMH CAD s/p JAC to mCX and JAC to RCA (2019), HTN, HLD, history of PE in 1974 after knee surgery, remote rheumatic fever, CML p/w left-sided chest pain for several days which radiates to the left arm and is associated with "gagging up" clear phlegm. Patient reports her chest pain began on  and has occurred at rest, on and off lasting 5 minutes several times per day and has seemed to improve. She last experienced chest pain yesterday when she came to the ED and currently denies chest pain, though endorses feeling chest pressure this morning. She denies SOB, palpitations, or dizziness.      PMHx:   HTN (hypertension)    Hyperlipidemia    Hypothyroidism    GERD (gastroesophageal reflux disease)    Gout    OA (osteoarthritis)    Arrhythmia    Kidney calculi    Pulmonary embolism    Rheumatic fever    COPD, mild    Lumbar radiculopathy    Lumbar spinal stenosis    Heart murmur    Scoliosis    Osteoporosis        PSHx:   History of cholecystectomy    Incisional hernia    H/O total knee replacement, bilateral    S/P lumbar laminectomy    H/O right inguinal hernia repair    Abdominal hernia        Allergies:  colchicine (Vomiting)      Home Meds:    Current Medications:   allopurinol 300 milliGRAM(s) Oral daily  aspirin enteric coated 81 milliGRAM(s) Oral daily  atorvastatin 10 milliGRAM(s) Oral daily  diltiazem    milliGRAM(s) Oral daily  enalapril 2.5 milliGRAM(s) Oral daily  furosemide    Tablet 20 milliGRAM(s) Oral daily  levothyroxine 125 MICROGram(s) Oral daily      FAMILY HISTORY:  Family history of diabetes mellitus (DM) (Sibling)    Family history of premature CAD    Family history of stomach cancer        Social History:  Smoking History: former  Alcohol Use:  Drug Use:    REVIEW OF SYSTEMS:  CONSTITUTIONAL: No weakness, fevers or chills  EYES/ENT: No visual changes;  No dysphagia  NECK: No pain or stiffness  RESPIRATORY: No cough, wheezing, hemoptysis; No shortness of breath  CARDIOVASCULAR: +chest pressure; No chest pain or palpitations; No lower extremity edema  GASTROINTESTINAL: No abdominal or epigastric pain. No nausea, vomiting, or hematemesis; No diarrhea or constipation. No melena or hematochezia.  BACK: No back pain  GENITOURINARY: No dysuria, frequency or hematuria  NEUROLOGICAL: No numbness or weakness  SKIN: No itching, burning, rashes, or lesions   All other review of systems is negative unless indicated above.    Physical Exam:  T(F): 98 (-), Max: 98.1 (-)  HR: 60 () (50 - 60)  BP: 143/83 (-) (109/66 - 146/96)  RR: 17 (-18)  SpO2: 98% ()  GENERAL: No acute distress, well-developed  HEAD:  Atraumatic, Normocephalic  ENT: EOMI, PERRLA, conjunctiva and sclera clear, No JVD, moist mucosa  CHEST/LUNG: Clear to auscultation bilaterally; No wheeze, equal breath sounds bilaterally   HEART: regular rhythm, bradycardic; +systolic murmur, no rubs or gallops  ABDOMEN: Soft, Nontender, Nondistended;  EXTREMITIES:  No clubbing, cyanosis, or edema  PSYCH: Nl behavior, nl affect  NEUROLOGY: AAOx3, non-focal   SKIN: Normal color, No rashes or lesions on limited skin exam    Cardiovascular Diagnostic Testing:    ECG: Personally reviewed:    Echo: Personally reviewed:    Stress Testing:    Cath:    Imaging:    CXR: Personally reviewed    Labs: Personally reviewed                        9.8    4.61  )-----------( 129      ( 2022 18:01 )             31.8     02-17    143  |  106  |  30<H>  ----------------------------<  109<H>  4.2   |  24  |  1.16    Ca    9.0      2022 18:01  Mg     1.7     02-17    TPro  6.5  /  Alb  3.9  /  TBili  0.4  /  DBili  x   /  AST  21  /  ALT  9<L>  /  AlkPhos  63  02-17    PT/INR - ( 2022 18:01 )   PT: 11.0 sec;   INR: 0.91 ratio         PTT - ( 2022 18:01 )  PTT:30.3 sec    CARDIAC MARKERS ( 2022 19:29 )  29 ng/L / x     / x     / x     / x     / x      CARDIAC MARKERS ( 2022 18:01 )  28 ng/L / x     / x     / x     / x     / x              Total Cholesterol: 140  LDL: --  HDL: 64  T         HPI: 71F PMH CAD s/p JAC to mCX and JAC to RCA (2019), HTN, HLD, history of PE in 1974 after knee surgery, remote rheumatic fever, CML p/w left-sided chest pain for several days which radiates to the left arm and is associated with "gagging up" clear phlegm. Patient reports her chest pain began on  and has occurred at rest, on and off lasting 5 minutes several times per day and has seemed to improve. She last experienced chest pain yesterday when she came to the ED and currently denies chest pain, though endorses feeling chest pressure this morning. She denies SOB, palpitations, or dizziness.      PMHx:   HTN (hypertension)    Hyperlipidemia    Hypothyroidism    GERD (gastroesophageal reflux disease)    Gout    OA (osteoarthritis)    Arrhythmia    Kidney calculi    Pulmonary embolism    Rheumatic fever    COPD, mild    Lumbar radiculopathy    Lumbar spinal stenosis    Heart murmur    Scoliosis    Osteoporosis        PSHx:   History of cholecystectomy    Incisional hernia    H/O total knee replacement, bilateral    S/P lumbar laminectomy    H/O right inguinal hernia repair    Abdominal hernia        Allergies:  colchicine (Vomiting)      Home Meds:    Current Medications:   allopurinol 300 milliGRAM(s) Oral daily  aspirin enteric coated 81 milliGRAM(s) Oral daily  atorvastatin 10 milliGRAM(s) Oral daily  diltiazem    milliGRAM(s) Oral daily  enalapril 2.5 milliGRAM(s) Oral daily  furosemide    Tablet 20 milliGRAM(s) Oral daily  levothyroxine 125 MICROGram(s) Oral daily      FAMILY HISTORY:  Family history of diabetes mellitus (DM) (Sibling)    Family history of premature CAD    Family history of stomach cancer        Social History:  Smoking History: former  Alcohol Use: denies  Drug Use: denies    REVIEW OF SYSTEMS:  CONSTITUTIONAL: No weakness, fevers or chills  EYES/ENT: No visual changes;  No dysphagia  NECK: No pain or stiffness  RESPIRATORY: No cough, wheezing, hemoptysis; No shortness of breath  CARDIOVASCULAR: +chest pressure; No chest pain or palpitations; No lower extremity edema  GASTROINTESTINAL: No abdominal or epigastric pain. No nausea, vomiting, or hematemesis; No diarrhea or constipation. No melena or hematochezia.  BACK: No back pain  GENITOURINARY: No dysuria, frequency or hematuria  NEUROLOGICAL: No numbness or weakness  SKIN: No itching, burning, rashes, or lesions   All other review of systems is negative unless indicated above.    Physical Exam:  T(F): 98 (-18), Max: 98.1 (-)  HR: 60 (-18) (50 - 60)  BP: 143/83 (-18) (109/66 - 146/96)  RR: 17 (-18)  SpO2: 98% (-)  GENERAL: No acute distress, well-developed  HEAD:  Atraumatic, Normocephalic  ENT: EOMI, PERRLA, conjunctiva and sclera clear, No JVD, moist mucosa  CHEST/LUNG: Clear to auscultation bilaterally; No wheeze, equal breath sounds bilaterally   HEART: regular rhythm, bradycardic; no murmur, rubs or gallops  ABDOMEN: Soft, Nontender, Nondistended;  EXTREMITIES:  No clubbing, cyanosis, or edema  PSYCH: Nl behavior, nl affect  NEUROLOGY: AAOx3, non-focal   SKIN: Normal color, No rashes or lesions on limited skin exam    Cardiovascular Diagnostic Testing:    ECG: Personally reviewed: low voltage, new TWI compared with prior    Echo: Personally reviewed: e< from: Transthoracic Echocardiogram (22 @ 10:14) >  Observations:  Mitral Valve: Normal mitral valve. Mild mitral  regurgitation.  Aortic Valve/Aorta: Normal aortic valve.  Normal aortic root size.  Left Atrium: Mildly dilated left atrium.  Left Ventricle: Normal left ventricular internal dimensions  and wall thickness.  Normal left ventricular systolic function. No segmental  wall motion abnormalities.  Normal diastolic function.  Right Heart: Normal right atrium. Normal right ventricular  size and function.  Normal tricuspid valve. Minimaltricuspid regurgitation.  Normal pulmonic valve. Minimal pulmonic regurgitation.  Pericardium/Pleura: Normal pericardium with no pericardial  effusion.  Hemodynamic: No evidence of pulmonary hypertensoion.  ------------------------------------------------------------------------  Conclusions:  Normal left ventricular systolic function. No segmental  wall motion abnormalities.  ------------------------------------------------------------------------  Confirmed on  2022 - 15:13:33 by Dick Drake MD,  JUDITH  ------------------------------------------------------------------------    < end of copied text >      Cath: < from: Cardiac Cath Lab - Adult (19 @ 10:38) >  CORONARY VESSELS: The coronary circulation is right dominant.  LM:   --  LM: Normal.  LAD:   --  Proximal LAD: There was a 30 % stenosis.  CX:   --  Mid circumflex: There was a 80 % stenosis.  RCA:   --  Mid RCA: There was a 80 % stenosis.  COMPLICATIONS: There were no complications.  INTERVENTIONAL RECOMMENDATIONS: DAPT for 1 year  Prepared and signed by  Louis Calvillo M.D.  Signed 2019 08:53:01    < end of copied text >  < from: Cardiac Cath Lab - Adult (19 @ 13:14) >  CORONARY VESSELS:  RCA:   --  Mid RCA: There was a 80 % stenosis.  COMPLICATIONS: There were no complications.    < end of copied text >      Imaging:    CXR: Personally reviewed < from: Xray Chest 1 View- PORTABLE-Urgent (Xray Chest 1 View- PORTABLE-Urgent .) (22 @ 18:31) >  IMPRESSION:    Clear lungs.    < end of copied text >      Labs: Personally reviewed                        9.8    4.61  )-----------( 129      ( 2022 18:01 )             31.8     02-17    143  |  106  |  30<H>  ----------------------------<  109<H>  4.2   |  24  |  1.16    Ca    9.0      2022 18:01  Mg     1.7         TPro  6.5  /  Alb  3.9  /  TBili  0.4  /  DBili  x   /  AST  21  /  ALT  9<L>  /  AlkPhos  63  02-17    PT/INR - ( 2022 18:01 )   PT: 11.0 sec;   INR: 0.91 ratio         PTT - ( 2022 18:01 )  PTT:30.3 sec    CARDIAC MARKERS ( 2022 19:29 )  29 ng/L / x     / x     / x     / x     / x      CARDIAC MARKERS ( 2022 18:01 )  28 ng/L / x     / x     / x     / x     / x          Total Cholesterol: 140  LDL: --  HDL: 64  T     HPI: 71F PMH CAD s/p JAC to mCX and JAC to RCA (2019), HTN, HLD, history of PE in 1974 after knee surgery, remote rheumatic fever, CML p/w left-sided chest pain for several days which radiates to the left arm and is associated with "gagging up" clear phlegm. Patient reports her chest pain began on  and has occurred at rest, on and off lasting 5 minutes several times per day and has seemed to improve. She last experienced chest pain yesterday when she came to the ED and currently denies chest pain, though endorses feeling chest pressure this morning. She denies SOB, palpitations, or dizziness.      PMHx:   HTN (hypertension)    Hyperlipidemia    Hypothyroidism    GERD (gastroesophageal reflux disease)    Gout    OA (osteoarthritis)    Arrhythmia    Kidney calculi    Pulmonary embolism    Rheumatic fever    COPD, mild    Lumbar radiculopathy    Lumbar spinal stenosis    Heart murmur    Scoliosis    Osteoporosis        PSHx:   History of cholecystectomy    Incisional hernia    H/O total knee replacement, bilateral    S/P lumbar laminectomy    H/O right inguinal hernia repair    Abdominal hernia        Allergies:  colchicine (Vomiting)      Home Meds:    Current Medications:   allopurinol 300 milliGRAM(s) Oral daily  aspirin enteric coated 81 milliGRAM(s) Oral daily  atorvastatin 10 milliGRAM(s) Oral daily  diltiazem    milliGRAM(s) Oral daily  enalapril 2.5 milliGRAM(s) Oral daily  furosemide    Tablet 20 milliGRAM(s) Oral daily  levothyroxine 125 MICROGram(s) Oral daily      FAMILY HISTORY:  Family history of diabetes mellitus (DM) (Sibling)    Family history of premature CAD    Family history of stomach cancer        Social History:  Smoking History: former  Alcohol Use: denies  Drug Use: denies    REVIEW OF SYSTEMS:  CONSTITUTIONAL: No weakness, fevers or chills  EYES/ENT: No visual changes;  No dysphagia  NECK: No pain or stiffness  RESPIRATORY: No cough, wheezing, hemoptysis; No shortness of breath  CARDIOVASCULAR: +chest pressure; No chest pain or palpitations; No lower extremity edema  GASTROINTESTINAL: No abdominal or epigastric pain. No nausea, vomiting, or hematemesis; No diarrhea or constipation. No melena or hematochezia.  BACK: No back pain  GENITOURINARY: No dysuria, frequency or hematuria  NEUROLOGICAL: No numbness or weakness  SKIN: No itching, burning, rashes, or lesions   All other review of systems is negative unless indicated above.    Physical Exam:  T(F): 98 (-18), Max: 98.1 (-17)  HR: 60 (-18) (50 - 60)  BP: 143/83 (02-18) (109/66 - 146/96)  RR: 17 (-18)  SpO2: 98% (-18)  GENERAL: No acute distress, well-developed  HEAD:  Atraumatic, Normocephalic  ENT: EOMI, PERRLA, conjunctiva and sclera clear, No JVD, moist mucosa  CHEST/LUNG: Clear to auscultation bilaterally; No wheeze, equal breath sounds bilaterally   HEART: regular rhythm, bradycardic; no murmur, rubs or gallops  ABDOMEN: Soft, Nontender, Nondistended;  EXTREMITIES:  No clubbing, cyanosis, or edema  PSYCH: Nl behavior, nl affect  NEUROLOGY: AAOx3, non-focal   SKIN: Normal color, No rashes or lesions on limited skin exam    Cardiovascular Diagnostic Testing:    ECG: Personally reviewed: sinus bradycardia, low voltage, new TWI compared with prior    Echo: Personally reviewed: e< from: Transthoracic Echocardiogram (22 @ 10:14) >  Observations:  Mitral Valve: Normal mitral valve. Mild mitral  regurgitation.  Aortic Valve/Aorta: Normal aortic valve.  Normal aortic root size.  Left Atrium: Mildly dilated left atrium.  Left Ventricle: Normal left ventricular internal dimensions  and wall thickness.  Normal left ventricular systolic function. No segmental  wall motion abnormalities.  Normal diastolic function.  Right Heart: Normal right atrium. Normal right ventricular  size and function.  Normal tricuspid valve. Minimaltricuspid regurgitation.  Normal pulmonic valve. Minimal pulmonic regurgitation.  Pericardium/Pleura: Normal pericardium with no pericardial  effusion.  Hemodynamic: No evidence of pulmonary hypertensoion.  ------------------------------------------------------------------------  Conclusions:  Normal left ventricular systolic function. No segmental  wall motion abnormalities.  ------------------------------------------------------------------------  Confirmed on  2022 - 15:13:33 by Dick Drake MD, FASE  ------------------------------------------------------------------------    < end of copied text >      Cath: < from: Cardiac Cath Lab - Adult (19 @ 10:38) >  CORONARY VESSELS: The coronary circulation is right dominant.  LM:   --  LM: Normal.  LAD:   --  Proximal LAD: There was a 30 % stenosis.  CX:   --  Mid circumflex: There was a 80 % stenosis.  RCA:   --  Mid RCA: There was a 80 % stenosis.  COMPLICATIONS: There were no complications.  INTERVENTIONAL RECOMMENDATIONS: DAPT for 1 year  Prepared and signed by  Louis Calvillo M.D.  Signed 2019 08:53:01    < end of copied text >  < from: Cardiac Cath Lab - Adult (19 @ 13:14) >  CORONARY VESSELS:  RCA:   --  Mid RCA: There was a 80 % stenosis.  COMPLICATIONS: There were no complications.    < end of copied text >      Imaging:    CXR: Personally reviewed < from: Xray Chest 1 View- PORTABLE-Urgent (Xray Chest 1 View- PORTABLE-Urgent .) (22 @ 18:31) >  IMPRESSION:    Clear lungs.    < end of copied text >      Labs: Personally reviewed                        9.8    4.61  )-----------( 129      ( 2022 18:01 )             31.8     02-    143  |  106  |  30<H>  ----------------------------<  109<H>  4.2   |  24  |  1.16    Ca    9.0      2022 18:01  Mg     1.7         TPro  6.5  /  Alb  3.9  /  TBili  0.4  /  DBili  x   /  AST  21  /  ALT  9<L>  /  AlkPhos  63  02-17    PT/INR - ( 2022 18:01 )   PT: 11.0 sec;   INR: 0.91 ratio         PTT - ( 2022 18:01 )  PTT:30.3 sec    CARDIAC MARKERS ( 2022 19:29 )  29 ng/L / x     / x     / x     / x     / x      CARDIAC MARKERS ( 2022 18:01 )  28 ng/L / x     / x     / x     / x     / x          Total Cholesterol: 140  LDL: --  HDL: 64  T

## 2022-02-18 NOTE — H&P ADULT - [facility] PE MLT D E PC
Bi-Rhombic Flap Text: The defect edges were debeveled with a #15 scalpel blade.  Given the location of the defect and the proximity to free margins a bi-rhombic flap was deemed most appropriate.  Using a sterile surgical marker, an appropriate rhombic flap was drawn incorporating the defect. The area thus outlined was incised deep to adipose tissue with a #15 scalpel blade.  The skin margins were undermined to an appropriate distance in all directions utilizing iris scissors. regular rate and rhythm

## 2022-02-18 NOTE — H&P ADULT - ASSESSMENT
71F PMH CAD s/p JAC to mCX and JAC to RCA (Nov 2019), HTN, HLD, hypothyroid, history of PE in 1974 after knee surgery, remote rheumatic fever, CML p/w left-sided chest pain since sunday

## 2022-02-18 NOTE — ED CDU PROVIDER DISPOSITION NOTE - ATTENDING CONTRIBUTION TO CARE
Attending MD Hernandez:   I personally have seen and examined this patient.  Physician assistant note reviewed and agree on plan of care and except where noted.  See below for details.     [facility]    71F with PMH/PSH including CML, CAD s/p stent, HTN, HLD, OA, hypothyroidism, gout, PE (distant, provoked), remote rheumatic fever sent to the CDU after presenting to the ED with chest pain with radiation to LUE.  Reports L sided chest pressure at present, denies radiating.  Denies shortness of breath, dizziness, diaphoresis.  Denies cough, congestion, fevers.  Denies abdominal pain, nausea, vomiting, diarrhea.  Denies fevers.  A ten (10) point review of systems was negative other than as stated in the HPI or elsewhere in the chart.     Exam:   General: NAD  HENT: head NCAT, airway patent   Eyes: anicteric   Lungs: lungs CTAB with good inspiratory effort, no wheezing, no rhonchi, no rales  Cardiac: +S1S2, no m/r/g  GI: abdomen soft with +BS, NT, ND  : no CVAT  MSK: ranging neck and extremities freely, no calf tenderness, swelling, erythema or warmth  Neuro: moving all extremities spontaneously, sensory grossly intact, no gross neuro deficits  Psych: normal mood and affect     A/P: 71F with chest pain, given active chest pain, will admit for further ACS work up and management

## 2022-02-18 NOTE — H&P ADULT - PROBLEM SELECTOR PLAN 1
EKG sinus bradycardia 55bpm no st/t changes   Serial troponins   Continue to monitor on tele   C/w asprin and cardizem   Check A1C and lipid profile   Cards eval EKG sinus bradycardia 55bpm no st/t changes   Serial troponins   Continue to monitor on tele   C/w asprin 81mg   Check nuclear stress test   Check A1C and lipid profile   Cards eval EKG sinus bradycardia 55bpm   TTE (2/18/22): EF 65%, no wall motion abnormalities  Serial troponins   Continue to monitor on tele   C/w asprin 81mg   Check nuclear stress test   Check A1C and lipid profile   Cards eval

## 2022-02-18 NOTE — ED CDU PROVIDER DISPOSITION NOTE - NSFOLLOWUPINSTRUCTIONS_ED_ALL_ED_FT
Hydrate.     You may be contacted by our Emergency Department Referrals Coordinator to set up your follow up appointment within 24-48 hours of your discharge Monday- Friday. We recommend you follow up with your primary care provider/cardiologist within the next 2-3 days, please bring all of your results with you.     Please return to the Emergency Department with new, worsening, or concerning symptoms, such as:  -Shortness of breath or trouble breathing  -Pressure, pain, tightness in chest  -Facial drooping, arm weakness, or speech difficulty   -Head injury or loss of consciousness   -Nonstop bleeding or an open wound     *More detailed information regarding your visit and discharge can be found by reviewing this packet

## 2022-02-18 NOTE — PATIENT PROFILE ADULT - FALL HARM RISK - HARM RISK INTERVENTIONS

## 2022-02-18 NOTE — ED CDU PROVIDER SUBSEQUENT DAY NOTE - ATTENDING CONTRIBUTION TO CARE
Attending MD Hernandez:   I personally have seen and examined this patient.  Physician assistant note reviewed and agree on plan of care and except where noted.  See below for details.     [facility]    71[facility] with PMH/PSH including CML, CAD s/p stent, HTN, HLD, OA, hypothyroidism, gout, PE (distant, provoked), remote rheumatic fever sent to the CDU after presenting to the ED with chest pain with radiation to LUE.  Reports L sided chest pressure at present, denies radiating.  Denies shortness of breath, dizziness, diaphoresis.  Denies cough, congestion, fevers.  Denies abdominal pain, nausea, vomiting, diarrhea.  Denies fevers.  A ten (10) point review of systems was negative other than as stated in the HPI or elsewhere in the chart.     Exam:   General: NAD  HENT: head NCAT, airway patent   Eyes: anicteric   Lungs: lungs CTAB with good inspiratory effort, no wheezing, no rhonchi, no rales  Cardiac: +S1S2, no m/r/g  GI: abdomen soft with +BS, NT, ND  : no CVAT  MSK: ranging neck and extremities freely, no calf tenderness, swelling, erythema or warmth  Neuro: moving all extremities spontaneously, sensory grossly intact, no gross neuro deficits  Psych: normal mood and affect     A/P: 71F with chest pain, pending stress, given active chest pain will discuss with cards,

## 2022-02-18 NOTE — CONSULT NOTE ADULT - ATTENDING COMMENTS
The patient is a 71 year old woman with history of CAD s/p PCI in 2019, hypothyroidism, hypertension, and CML treated with nilotinib for about one year. She is seen for chest pain. Chest pain on and off for the past week, not worse with exertion or position. Called her cardiologist and was seen by covering physician who sent her to hospital for further evaluation.    With respect to CML, this was discovered by CBC in 2021 no significant symptoms other than fatigue. She was started on nilotinib with good response per patient. No prior treatments for CML.    Nilotinib is associated with important risk of coronary arterial and vascular thrombotic events. QTc prolongation and arrhythmia were also reported. Workup thus far notable for normal serial HsTnT and echo without new WMA and normal LVEF. ECG shows possible new non-specific T wave abnormality compared to prior record. QTc is prolonged from prior but still wnl. There is sinus bradycardia.    Given high risk association of nilotinib with arterial/thrombotic events would further risk stratify patient with stress testing or coronary angiography. Diltiazem may interact with nilotinib through [id number] pathway, so would hold this in view of bradycardia.    Repeat ECG for chest pain.  Monitor on telemetry  continue aspirin and statin  BP control with enalapril  hold nilotinib for ischemic evaluation    Please feel free to call me with questions.    Patient may follow up with me in Cardio-Oncology clinic after discharge if she wishes.    Chip Burgos MD [facility]  Cardio-Oncology  [phone]

## 2022-02-18 NOTE — H&P ADULT - PROBLEM SELECTOR PLAN 2
C/w cardizem and enalapril   Continue to monitor BP C/w enalapril and lasix    Continue to monitor BP

## 2022-02-19 LAB
ALBUMIN SERPL ELPH-MCNC: 4.2 G/DL — SIGNIFICANT CHANGE UP (ref 3.3–5)
ALP SERPL-CCNC: 71 U/L — SIGNIFICANT CHANGE UP (ref 40–120)
ALT FLD-CCNC: 6 U/L — LOW (ref 10–45)
ANION GAP SERPL CALC-SCNC: 13 MMOL/L — SIGNIFICANT CHANGE UP (ref 5–17)
AST SERPL-CCNC: 16 U/L — SIGNIFICANT CHANGE UP (ref 10–40)
BILIRUB SERPL-MCNC: 0.5 MG/DL — SIGNIFICANT CHANGE UP (ref 0.2–1.2)
BUN SERPL-MCNC: 18 MG/DL — SIGNIFICANT CHANGE UP (ref 7–23)
CALCIUM SERPL-MCNC: 9.5 MG/DL — SIGNIFICANT CHANGE UP (ref 8.4–10.5)
CHLORIDE SERPL-SCNC: 105 MMOL/L — SIGNIFICANT CHANGE UP (ref 96–108)
CO2 SERPL-SCNC: 27 MMOL/L — SIGNIFICANT CHANGE UP (ref 22–31)
CREAT SERPL-MCNC: 0.94 MG/DL — SIGNIFICANT CHANGE UP (ref 0.5–1.3)
GLUCOSE SERPL-MCNC: 93 MG/DL — SIGNIFICANT CHANGE UP (ref 70–99)
HCT VFR BLD CALC: 36.8 % — SIGNIFICANT CHANGE UP (ref 34.5–45)
HGB BLD-MCNC: 11.5 G/DL — SIGNIFICANT CHANGE UP (ref 11.5–15.5)
MAGNESIUM SERPL-MCNC: 2.2 MG/DL — SIGNIFICANT CHANGE UP (ref 1.6–2.6)
MCHC RBC-ENTMCNC: 29.7 PG — SIGNIFICANT CHANGE UP (ref 27–34)
MCHC RBC-ENTMCNC: 31.3 GM/DL — LOW (ref 32–36)
MCV RBC AUTO: 95.1 FL — SIGNIFICANT CHANGE UP (ref 80–100)
NRBC # BLD: 0 /100 WBCS — SIGNIFICANT CHANGE UP (ref 0–0)
PHOSPHATE SERPL-MCNC: 2.5 MG/DL — SIGNIFICANT CHANGE UP (ref 2.5–4.5)
PLATELET # BLD AUTO: 134 K/UL — LOW (ref 150–400)
POTASSIUM SERPL-MCNC: 3.6 MMOL/L — SIGNIFICANT CHANGE UP (ref 3.5–5.3)
POTASSIUM SERPL-SCNC: 3.6 MMOL/L — SIGNIFICANT CHANGE UP (ref 3.5–5.3)
PROT SERPL-MCNC: 7.2 G/DL — SIGNIFICANT CHANGE UP (ref 6–8.3)
RBC # BLD: 3.87 M/UL — SIGNIFICANT CHANGE UP (ref 3.8–5.2)
RBC # FLD: 14.3 % — SIGNIFICANT CHANGE UP (ref 10.3–14.5)
SODIUM SERPL-SCNC: 145 MMOL/L — SIGNIFICANT CHANGE UP (ref 135–145)
WBC # BLD: 3.83 K/UL — SIGNIFICANT CHANGE UP (ref 3.8–10.5)
WBC # FLD AUTO: 3.83 K/UL — SIGNIFICANT CHANGE UP (ref 3.8–10.5)

## 2022-02-19 PROCEDURE — 99233 SBSQ HOSP IP/OBS HIGH 50: CPT

## 2022-02-19 RX ORDER — POTASSIUM CHLORIDE 20 MEQ
40 PACKET (EA) ORAL ONCE
Refills: 0 | Status: COMPLETED | OUTPATIENT
Start: 2022-02-19 | End: 2022-02-19

## 2022-02-19 RX ADMIN — Medication 650 MILLIGRAM(S): at 21:31

## 2022-02-19 RX ADMIN — GABAPENTIN 400 MILLIGRAM(S): 400 CAPSULE ORAL at 21:31

## 2022-02-19 RX ADMIN — Medication 650 MILLIGRAM(S): at 22:00

## 2022-02-19 RX ADMIN — Medication 40 MILLIEQUIVALENT(S): at 13:20

## 2022-02-19 RX ADMIN — Medication 2.5 MILLIGRAM(S): at 05:23

## 2022-02-19 RX ADMIN — Medication 125 MICROGRAM(S): at 05:23

## 2022-02-19 RX ADMIN — GABAPENTIN 400 MILLIGRAM(S): 400 CAPSULE ORAL at 09:37

## 2022-02-19 RX ADMIN — Medication 650 MILLIGRAM(S): at 14:34

## 2022-02-19 RX ADMIN — HEPARIN SODIUM 5000 UNIT(S): 5000 INJECTION INTRAVENOUS; SUBCUTANEOUS at 13:21

## 2022-02-19 RX ADMIN — HEPARIN SODIUM 5000 UNIT(S): 5000 INJECTION INTRAVENOUS; SUBCUTANEOUS at 05:22

## 2022-02-19 RX ADMIN — ATORVASTATIN CALCIUM 10 MILLIGRAM(S): 80 TABLET, FILM COATED ORAL at 13:21

## 2022-02-19 RX ADMIN — Medication 81 MILLIGRAM(S): at 13:20

## 2022-02-19 RX ADMIN — HEPARIN SODIUM 5000 UNIT(S): 5000 INJECTION INTRAVENOUS; SUBCUTANEOUS at 21:31

## 2022-02-19 RX ADMIN — Medication 300 MILLIGRAM(S): at 13:21

## 2022-02-19 RX ADMIN — Medication 20 MILLIGRAM(S): at 05:22

## 2022-02-19 RX ADMIN — Medication 650 MILLIGRAM(S): at 15:04

## 2022-02-19 NOTE — PROGRESS NOTE ADULT - SUBJECTIVE AND OBJECTIVE BOX
Patient is a 71y old  Female who presents with a chief complaint of Chest pain (18 Feb 2022 16:49)      SUBJECTIVE / OVERNIGHT EVENTS: chest pain is better, no sob, abd pain     MEDICATIONS  (STANDING):  allopurinol 300 milliGRAM(s) Oral daily  aspirin enteric coated 81 milliGRAM(s) Oral daily  atorvastatin 10 milliGRAM(s) Oral daily  enalapril 2.5 milliGRAM(s) Oral daily  furosemide    Tablet 20 milliGRAM(s) Oral daily  gabapentin 400 milliGRAM(s) Oral two times a day  heparin   Injectable 5000 Unit(s) SubCutaneous every 8 hours  influenza  Vaccine (HIGH DOSE) 0.7 milliLiter(s) IntraMuscular once  levothyroxine 125 MICROGram(s) Oral daily    MEDICATIONS  (PRN):  acetaminophen     Tablet .. 650 milliGRAM(s) Oral every 6 hours PRN Temp greater or equal to 38C (100.4F), Mild Pain (1 - 3)  melatonin 3 milliGRAM(s) Oral at bedtime PRN Insomnia        CAPILLARY BLOOD GLUCOSE        I&O's Summary    18 Feb 2022 07:01  -  19 Feb 2022 07:00  --------------------------------------------------------  IN: 490 mL / OUT: 0 mL / NET: 490 mL    19 Feb 2022 07:01  -  19 Feb 2022 17:36  --------------------------------------------------------  IN: 460 mL / OUT: 1 mL / NET: 459 mL        PHYSICAL EXAM:  GENERAL: NAD, well-developed  HEAD:  Atraumatic, Normocephalic  EYES: conjunctiva and sclera clear  NECK: No JVD  CHEST/LUNG: Clear to auscultation bilaterally; No wheeze  HEART: Regular rate and rhythm; No murmurs, rubs, or gallops  ABDOMEN: Soft, Nontender, Nondistended; Bowel sounds present  EXTREMITIES:  2+ Peripheral Pulses, No clubbing, cyanosis, or edema  PSYCH: AAOx3    LABS:                        11.5   3.83  )-----------( 134      ( 19 Feb 2022 07:18 )             36.8     02-19    145  |  105  |  18  ----------------------------<  93  3.6   |  27  |  0.94    Ca    9.5      19 Feb 2022 07:13  Phos  2.5     02-19  Mg     2.2     02-19    TPro  7.2  /  Alb  4.2  /  TBili  0.5  /  DBili  x   /  AST  16  /  ALT  6<L>  /  AlkPhos  71  02-19    PT/INR - ( 17 Feb 2022 18:01 )   PT: 11.0 sec;   INR: 0.91 ratio         PTT - ( 17 Feb 2022 18:01 )  PTT:30.3 sec          RADIOLOGY & ADDITIONAL TESTS:    Imaging Personally Reviewed:    Consultant(s) Notes Reviewed:      Care Discussed with Consultants/Other Providers:

## 2022-02-20 LAB
ANION GAP SERPL CALC-SCNC: 12 MMOL/L — SIGNIFICANT CHANGE UP (ref 5–17)
BUN SERPL-MCNC: 25 MG/DL — HIGH (ref 7–23)
CALCIUM SERPL-MCNC: 9.6 MG/DL — SIGNIFICANT CHANGE UP (ref 8.4–10.5)
CHLORIDE SERPL-SCNC: 103 MMOL/L — SIGNIFICANT CHANGE UP (ref 96–108)
CO2 SERPL-SCNC: 26 MMOL/L — SIGNIFICANT CHANGE UP (ref 22–31)
CREAT SERPL-MCNC: 0.94 MG/DL — SIGNIFICANT CHANGE UP (ref 0.5–1.3)
GLUCOSE SERPL-MCNC: 83 MG/DL — SIGNIFICANT CHANGE UP (ref 70–99)
HCT VFR BLD CALC: 38.7 % — SIGNIFICANT CHANGE UP (ref 34.5–45)
HGB BLD-MCNC: 12.1 G/DL — SIGNIFICANT CHANGE UP (ref 11.5–15.5)
MCHC RBC-ENTMCNC: 30 PG — SIGNIFICANT CHANGE UP (ref 27–34)
MCHC RBC-ENTMCNC: 31.3 GM/DL — LOW (ref 32–36)
MCV RBC AUTO: 95.8 FL — SIGNIFICANT CHANGE UP (ref 80–100)
NRBC # BLD: 0 /100 WBCS — SIGNIFICANT CHANGE UP (ref 0–0)
PLATELET # BLD AUTO: 153 K/UL — SIGNIFICANT CHANGE UP (ref 150–400)
POTASSIUM SERPL-MCNC: 4.2 MMOL/L — SIGNIFICANT CHANGE UP (ref 3.5–5.3)
POTASSIUM SERPL-SCNC: 4.2 MMOL/L — SIGNIFICANT CHANGE UP (ref 3.5–5.3)
RBC # BLD: 4.04 M/UL — SIGNIFICANT CHANGE UP (ref 3.8–5.2)
RBC # FLD: 14.4 % — SIGNIFICANT CHANGE UP (ref 10.3–14.5)
SODIUM SERPL-SCNC: 141 MMOL/L — SIGNIFICANT CHANGE UP (ref 135–145)
WBC # BLD: 6.19 K/UL — SIGNIFICANT CHANGE UP (ref 3.8–10.5)
WBC # FLD AUTO: 6.19 K/UL — SIGNIFICANT CHANGE UP (ref 3.8–10.5)

## 2022-02-20 PROCEDURE — 99232 SBSQ HOSP IP/OBS MODERATE 35: CPT | Mod: GC

## 2022-02-20 PROCEDURE — 99233 SBSQ HOSP IP/OBS HIGH 50: CPT

## 2022-02-20 RX ADMIN — Medication 650 MILLIGRAM(S): at 19:30

## 2022-02-20 RX ADMIN — ATORVASTATIN CALCIUM 10 MILLIGRAM(S): 80 TABLET, FILM COATED ORAL at 22:19

## 2022-02-20 RX ADMIN — Medication 650 MILLIGRAM(S): at 10:29

## 2022-02-20 RX ADMIN — Medication 81 MILLIGRAM(S): at 12:33

## 2022-02-20 RX ADMIN — GABAPENTIN 400 MILLIGRAM(S): 400 CAPSULE ORAL at 10:02

## 2022-02-20 RX ADMIN — HEPARIN SODIUM 5000 UNIT(S): 5000 INJECTION INTRAVENOUS; SUBCUTANEOUS at 22:19

## 2022-02-20 RX ADMIN — Medication 20 MILLIGRAM(S): at 05:56

## 2022-02-20 RX ADMIN — Medication 300 MILLIGRAM(S): at 12:33

## 2022-02-20 RX ADMIN — GABAPENTIN 400 MILLIGRAM(S): 400 CAPSULE ORAL at 22:19

## 2022-02-20 RX ADMIN — Medication 650 MILLIGRAM(S): at 18:58

## 2022-02-20 RX ADMIN — Medication 125 MICROGRAM(S): at 05:56

## 2022-02-20 RX ADMIN — Medication 3 MILLIGRAM(S): at 22:20

## 2022-02-20 RX ADMIN — Medication 2.5 MILLIGRAM(S): at 05:56

## 2022-02-20 RX ADMIN — Medication 650 MILLIGRAM(S): at 11:00

## 2022-02-20 RX ADMIN — HEPARIN SODIUM 5000 UNIT(S): 5000 INJECTION INTRAVENOUS; SUBCUTANEOUS at 05:56

## 2022-02-20 RX ADMIN — Medication 650 MILLIGRAM(S): at 03:34

## 2022-02-20 RX ADMIN — HEPARIN SODIUM 5000 UNIT(S): 5000 INJECTION INTRAVENOUS; SUBCUTANEOUS at 12:34

## 2022-02-20 NOTE — PROGRESS NOTE ADULT - ASSESSMENT
71F PMH CAD s/p JAC to mCX and JAC to RCA (Nov 2019), HTN, HLD, history of PE in 1974 after knee surgery, remote rheumatic fever, CML on Tasigna (nilotinib, bcr-Abl TKI) who p/w intermittent left-sided chest pain for several days. No active chest pain, negative troponin, EKG unlikely ACS.     Recommendations:    1. Chest pain  - Nuclear stress test tomorrow- NO AM Caffeine   - TTE (2/18/22): EF 65%, no wall motion abnormalities    2. CML  - recommend holding Tasigna until chest pain further evaluated due to its association with thrombotic events  - patient received last dose of Tasigna 2/18 AM    3. HTN  - hold diltiazem given potential interaction with nilotinib  -  increase Enalapril to 5 mg daily  - continue Furosemide 20mg PO qd    4. CAD  - c/w aspirin 81mg qd  - continue statin    5. HLD  - c/w atorvastatin 10mg qd  - lipids wnl  - A1c 5.8 71F PMH CAD s/p JAC to mCX and JAC to RCA (Nov 2019), HTN, HLD, history of PE in 1974 after knee surgery, remote rheumatic fever, CML on Tasigna (nilotinib, bcr-Abl TKI) who p/w intermittent left-sided chest pain for several days. No active chest pain, negative troponin, EKG unlikely ACS.     Recommendations:    1. Chest pain  - Nuclear stress test not performed today due to caffeine intake   - TTE (2/18/22): EF 65%, no wall motion abnormalities    2. CML  - recommend holding Tasigna until chest pain further evaluated due to its association with thrombotic events  - patient received last dose of Tasigna 2/18 AM    3. HTN  - hold diltiazem given potential interaction with nilotinib  -  continue Enalapril   - continue Furosemide 20mg PO qd    4. CAD  - c/w aspirin 81mg qd  - continue statin    5. HLD  - c/w atorvastatin 10mg qd  - lipids wnl  - A1c 5.8

## 2022-02-20 NOTE — PROGRESS NOTE ADULT - SUBJECTIVE AND OBJECTIVE BOX
Interval History: Caffeine this morning - stress test postponed     Medications:  acetaminophen     Tablet .. 650 milliGRAM(s) Oral every 6 hours PRN  allopurinol 300 milliGRAM(s) Oral daily  aspirin enteric coated 81 milliGRAM(s) Oral daily  atorvastatin 10 milliGRAM(s) Oral daily  enalapril 2.5 milliGRAM(s) Oral daily  furosemide    Tablet 20 milliGRAM(s) Oral daily  gabapentin 400 milliGRAM(s) Oral two times a day  heparin   Injectable 5000 Unit(s) SubCutaneous every 8 hours  influenza  Vaccine (HIGH DOSE) 0.7 milliLiter(s) IntraMuscular once  levothyroxine 125 MICROGram(s) Oral daily  melatonin 3 milliGRAM(s) Oral at bedtime PRN      Vitals:  T(C): 36.4 (02-20-22 @ 10:06), Max: 36.9 (02-19-22 @ 22:28)  HR: 72 (02-20-22 @ 10:06) (66 - 72)  BP: 100/56 (02-20-22 @ 10:06) (100/56 - 128/70)  RR: 18 (02-20-22 @ 10:06) (18 - 20)  SpO2: 96% (02-20-22 @ 10:06) (92% - 96%)  Wt(kg): --      I&O's Summary    19 Feb 2022 07:01  -  20 Feb 2022 07:00  --------------------------------------------------------  IN: 800 mL / OUT: 1 mL / NET: 799 mL         Physical Exam:  Appearance: No Acute Distress  HEENT: No JVD  Cardiovascular: Normal S1 S2, No murmurs/rubs/gallops  Respiratory: Clear to auscultation bilaterally  Extremities: No LE edema  Psychiatry: A & O x 3, Mood & affect appropriate    Labs:                        12.1   6.19  )-----------( 153      ( 20 Feb 2022 07:34 )             38.7     02-20    141  |  103  |  25<H>  ----------------------------<  83  4.2   |  26  |  0.94    Ca    9.6      20 Feb 2022 07:33  Phos  2.5     02-19  Mg     2.2     02-19    TPro  7.2  /  Alb  4.2  /  TBili  0.5  /  DBili  x   /  AST  16  /  ALT  6<L>  /  AlkPhos  71  02-19        TELEMETRY: SR 50 -70

## 2022-02-20 NOTE — PROGRESS NOTE ADULT - SUBJECTIVE AND OBJECTIVE BOX
Patient is a 71y old  Female who presents with a chief complaint of Chest pain (20 Feb 2022 12:44)      SUBJECTIVE / OVERNIGHT EVENTS: feels better, no cp, sob, abd pain, chills    MEDICATIONS  (STANDING):  allopurinol 300 milliGRAM(s) Oral daily  aspirin enteric coated 81 milliGRAM(s) Oral daily  atorvastatin 10 milliGRAM(s) Oral daily  enalapril 5 milliGRAM(s) Oral daily  furosemide    Tablet 20 milliGRAM(s) Oral daily  gabapentin 400 milliGRAM(s) Oral two times a day  heparin   Injectable 5000 Unit(s) SubCutaneous every 8 hours  influenza  Vaccine (HIGH DOSE) 0.7 milliLiter(s) IntraMuscular once  levothyroxine 125 MICROGram(s) Oral daily    MEDICATIONS  (PRN):  acetaminophen     Tablet .. 650 milliGRAM(s) Oral every 6 hours PRN Temp greater or equal to 38C (100.4F), Mild Pain (1 - 3)  melatonin 3 milliGRAM(s) Oral at bedtime PRN Insomnia        CAPILLARY BLOOD GLUCOSE        I&O's Summary    19 Feb 2022 07:01  -  20 Feb 2022 07:00  --------------------------------------------------------  IN: 800 mL / OUT: 1 mL / NET: 799 mL    20 Feb 2022 07:01  -  20 Feb 2022 16:39  --------------------------------------------------------  IN: 540 mL / OUT: 0 mL / NET: 540 mL        PHYSICAL EXAM:  GENERAL: NAD, well-developed  HEAD:  Atraumatic, Normocephalic  EYES:  conjunctiva and sclera clear  NECK: Supple, No JVD  CHEST/LUNG: Clear to auscultation bilaterally; No wheeze  HEART: Regular rate and rhythm; No murmurs, rubs, or gallops  ABDOMEN: Soft, Nontender, Nondistended; Bowel sounds present  EXTREMITIES:  2+ Peripheral Pulses, No clubbing, cyanosis, or edema  PSYCH: AAOx3      LABS:                        12.1   6.19  )-----------( 153      ( 20 Feb 2022 07:34 )             38.7     02-20    141  |  103  |  25<H>  ----------------------------<  83  4.2   |  26  |  0.94    Ca    9.6      20 Feb 2022 07:33  Phos  2.5     02-19  Mg     2.2     02-19    TPro  7.2  /  Alb  4.2  /  TBili  0.5  /  DBili  x   /  AST  16  /  ALT  6<L>  /  AlkPhos  71  02-19              RADIOLOGY & ADDITIONAL TESTS:    Imaging Personally Reviewed:    Consultant(s) Notes Reviewed:      Care Discussed with Consultants/Other Providers:

## 2022-02-21 DIAGNOSIS — H92.01 OTALGIA, RIGHT EAR: ICD-10-CM

## 2022-02-21 DIAGNOSIS — C92.10 CHRONIC MYELOID LEUKEMIA, BCR/ABL-POSITIVE, NOT HAVING ACHIEVED REMISSION: ICD-10-CM

## 2022-02-21 LAB
ANION GAP SERPL CALC-SCNC: 10 MMOL/L — SIGNIFICANT CHANGE UP (ref 5–17)
BUN SERPL-MCNC: 23 MG/DL — SIGNIFICANT CHANGE UP (ref 7–23)
CALCIUM SERPL-MCNC: 9.7 MG/DL — SIGNIFICANT CHANGE UP (ref 8.4–10.5)
CHLORIDE SERPL-SCNC: 105 MMOL/L — SIGNIFICANT CHANGE UP (ref 96–108)
CO2 SERPL-SCNC: 25 MMOL/L — SIGNIFICANT CHANGE UP (ref 22–31)
CREAT SERPL-MCNC: 0.85 MG/DL — SIGNIFICANT CHANGE UP (ref 0.5–1.3)
GLUCOSE SERPL-MCNC: 90 MG/DL — SIGNIFICANT CHANGE UP (ref 70–99)
HCT VFR BLD CALC: 35.2 % — SIGNIFICANT CHANGE UP (ref 34.5–45)
HGB BLD-MCNC: 10.9 G/DL — LOW (ref 11.5–15.5)
MCHC RBC-ENTMCNC: 30 PG — SIGNIFICANT CHANGE UP (ref 27–34)
MCHC RBC-ENTMCNC: 31 GM/DL — LOW (ref 32–36)
MCV RBC AUTO: 97 FL — SIGNIFICANT CHANGE UP (ref 80–100)
NRBC # BLD: 0 /100 WBCS — SIGNIFICANT CHANGE UP (ref 0–0)
PLATELET # BLD AUTO: 138 K/UL — LOW (ref 150–400)
POTASSIUM SERPL-MCNC: 4.6 MMOL/L — SIGNIFICANT CHANGE UP (ref 3.5–5.3)
POTASSIUM SERPL-SCNC: 4.6 MMOL/L — SIGNIFICANT CHANGE UP (ref 3.5–5.3)
RBC # BLD: 3.63 M/UL — LOW (ref 3.8–5.2)
RBC # FLD: 14.2 % — SIGNIFICANT CHANGE UP (ref 10.3–14.5)
SODIUM SERPL-SCNC: 140 MMOL/L — SIGNIFICANT CHANGE UP (ref 135–145)
WBC # BLD: 4.54 K/UL — SIGNIFICANT CHANGE UP (ref 3.8–10.5)
WBC # FLD AUTO: 4.54 K/UL — SIGNIFICANT CHANGE UP (ref 3.8–10.5)

## 2022-02-21 PROCEDURE — 99233 SBSQ HOSP IP/OBS HIGH 50: CPT

## 2022-02-21 RX ORDER — SODIUM CHLORIDE 0.65 %
2 AEROSOL, SPRAY (ML) NASAL THREE TIMES A DAY
Refills: 0 | Status: DISCONTINUED | OUTPATIENT
Start: 2022-02-21 | End: 2022-02-24

## 2022-02-21 RX ADMIN — Medication 2 SPRAY(S): at 13:01

## 2022-02-21 RX ADMIN — HEPARIN SODIUM 5000 UNIT(S): 5000 INJECTION INTRAVENOUS; SUBCUTANEOUS at 21:11

## 2022-02-21 RX ADMIN — GABAPENTIN 400 MILLIGRAM(S): 400 CAPSULE ORAL at 09:40

## 2022-02-21 RX ADMIN — Medication 20 MILLIGRAM(S): at 05:59

## 2022-02-21 RX ADMIN — Medication 2 SPRAY(S): at 21:11

## 2022-02-21 RX ADMIN — GABAPENTIN 400 MILLIGRAM(S): 400 CAPSULE ORAL at 21:12

## 2022-02-21 RX ADMIN — Medication 2 SPRAY(S): at 11:02

## 2022-02-21 RX ADMIN — HEPARIN SODIUM 5000 UNIT(S): 5000 INJECTION INTRAVENOUS; SUBCUTANEOUS at 05:59

## 2022-02-21 RX ADMIN — Medication 3 MILLIGRAM(S): at 21:12

## 2022-02-21 RX ADMIN — Medication 650 MILLIGRAM(S): at 04:50

## 2022-02-21 RX ADMIN — Medication 650 MILLIGRAM(S): at 03:50

## 2022-02-21 RX ADMIN — HEPARIN SODIUM 5000 UNIT(S): 5000 INJECTION INTRAVENOUS; SUBCUTANEOUS at 13:02

## 2022-02-21 RX ADMIN — Medication 5 MILLIGRAM(S): at 06:02

## 2022-02-21 RX ADMIN — ATORVASTATIN CALCIUM 10 MILLIGRAM(S): 80 TABLET, FILM COATED ORAL at 21:12

## 2022-02-21 RX ADMIN — Medication 300 MILLIGRAM(S): at 13:01

## 2022-02-21 RX ADMIN — Medication 81 MILLIGRAM(S): at 13:01

## 2022-02-21 RX ADMIN — Medication 125 MICROGRAM(S): at 06:00

## 2022-02-21 NOTE — PROGRESS NOTE ADULT - SUBJECTIVE AND OBJECTIVE BOX
Patient is a 71y old  Female who presents with a chief complaint of Chest pain (20 Feb 2022 16:38)      SUBJECTIVE / OVERNIGHT EVENTS: reports she is having some tinitus in her right ear, some fullness and pain , denies cp, sob, abd pain     MEDICATIONS  (STANDING):  allopurinol 300 milliGRAM(s) Oral daily  aspirin enteric coated 81 milliGRAM(s) Oral daily  atorvastatin 10 milliGRAM(s) Oral daily  enalapril 5 milliGRAM(s) Oral daily  furosemide    Tablet 20 milliGRAM(s) Oral daily  gabapentin 400 milliGRAM(s) Oral two times a day  heparin   Injectable 5000 Unit(s) SubCutaneous every 8 hours  influenza  Vaccine (HIGH DOSE) 0.7 milliLiter(s) IntraMuscular once  levothyroxine 125 MICROGram(s) Oral daily  sodium chloride 0.65% Nasal 2 Spray(s) Both Nostrils three times a day    MEDICATIONS  (PRN):  acetaminophen     Tablet .. 650 milliGRAM(s) Oral every 6 hours PRN Temp greater or equal to 38C (100.4F), Mild Pain (1 - 3)  melatonin 3 milliGRAM(s) Oral at bedtime PRN Insomnia        CAPILLARY BLOOD GLUCOSE        I&O's Summary    20 Feb 2022 07:01  -  21 Feb 2022 07:00  --------------------------------------------------------  IN: 1380 mL / OUT: 0 mL / NET: 1380 mL    21 Feb 2022 07:01  -  21 Feb 2022 14:51  --------------------------------------------------------  IN: 480 mL / OUT: 0 mL / NET: 480 mL        PHYSICAL EXAM:  GENERAL: NAD, well-developed  HEAD:  Atraumatic, Normocephalic  EYES: conjunctiva and sclera clear  NECK: Supple, No JVD  CHEST/LUNG: Clear to auscultation bilaterally; No wheeze  HEART: Regular rate and rhythm; No murmurs, rubs, or gallops  ABDOMEN: Soft, Nontender, Nondistended; Bowel sounds present  EXTREMITIES:  2+ Peripheral Pulses, No clubbing, cyanosis, or edema  PSYCH: AAOx3      LABS:                        10.9   4.54  )-----------( 138      ( 21 Feb 2022 07:14 )             35.2     02-21    140  |  105  |  23  ----------------------------<  90  4.6   |  25  |  0.85    Ca    9.7      21 Feb 2022 07:14                RADIOLOGY & ADDITIONAL TESTS:    Imaging Personally Reviewed:    Consultant(s) Notes Reviewed:      Care Discussed with Consultants/Other Providers:

## 2022-02-21 NOTE — PROGRESS NOTE ADULT - ASSESSMENT
71F PMH CAD s/p JAC to mCX and JAC to RCA (Nov 2019), HTN, HLD, hypothyroid, history of PE in 1974 after knee surgery, remote rheumatic fever, CML p/w left-sided chest pain

## 2022-02-22 LAB
ANION GAP SERPL CALC-SCNC: 11 MMOL/L — SIGNIFICANT CHANGE UP (ref 5–17)
BUN SERPL-MCNC: 26 MG/DL — HIGH (ref 7–23)
CALCIUM SERPL-MCNC: 9.6 MG/DL — SIGNIFICANT CHANGE UP (ref 8.4–10.5)
CHLORIDE SERPL-SCNC: 105 MMOL/L — SIGNIFICANT CHANGE UP (ref 96–108)
CO2 SERPL-SCNC: 24 MMOL/L — SIGNIFICANT CHANGE UP (ref 22–31)
CREAT SERPL-MCNC: 0.78 MG/DL — SIGNIFICANT CHANGE UP (ref 0.5–1.3)
GLUCOSE SERPL-MCNC: 89 MG/DL — SIGNIFICANT CHANGE UP (ref 70–99)
HCT VFR BLD CALC: 34.7 % — SIGNIFICANT CHANGE UP (ref 34.5–45)
HGB BLD-MCNC: 10.5 G/DL — LOW (ref 11.5–15.5)
MCHC RBC-ENTMCNC: 29.9 PG — SIGNIFICANT CHANGE UP (ref 27–34)
MCHC RBC-ENTMCNC: 30.3 GM/DL — LOW (ref 32–36)
MCV RBC AUTO: 98.9 FL — SIGNIFICANT CHANGE UP (ref 80–100)
NRBC # BLD: 0 /100 WBCS — SIGNIFICANT CHANGE UP (ref 0–0)
PLATELET # BLD AUTO: 138 K/UL — LOW (ref 150–400)
POTASSIUM SERPL-MCNC: 4.3 MMOL/L — SIGNIFICANT CHANGE UP (ref 3.5–5.3)
POTASSIUM SERPL-SCNC: 4.3 MMOL/L — SIGNIFICANT CHANGE UP (ref 3.5–5.3)
RBC # BLD: 3.51 M/UL — LOW (ref 3.8–5.2)
RBC # FLD: 14.4 % — SIGNIFICANT CHANGE UP (ref 10.3–14.5)
SODIUM SERPL-SCNC: 140 MMOL/L — SIGNIFICANT CHANGE UP (ref 135–145)
WBC # BLD: 4.38 K/UL — SIGNIFICANT CHANGE UP (ref 3.8–10.5)
WBC # FLD AUTO: 4.38 K/UL — SIGNIFICANT CHANGE UP (ref 3.8–10.5)

## 2022-02-22 PROCEDURE — 78452 HT MUSCLE IMAGE SPECT MULT: CPT | Mod: 26

## 2022-02-22 PROCEDURE — 99232 SBSQ HOSP IP/OBS MODERATE 35: CPT

## 2022-02-22 PROCEDURE — 93018 CV STRESS TEST I&R ONLY: CPT

## 2022-02-22 PROCEDURE — 99233 SBSQ HOSP IP/OBS HIGH 50: CPT | Mod: GC

## 2022-02-22 PROCEDURE — 93016 CV STRESS TEST SUPVJ ONLY: CPT

## 2022-02-22 RX ADMIN — HEPARIN SODIUM 5000 UNIT(S): 5000 INJECTION INTRAVENOUS; SUBCUTANEOUS at 12:24

## 2022-02-22 RX ADMIN — HEPARIN SODIUM 5000 UNIT(S): 5000 INJECTION INTRAVENOUS; SUBCUTANEOUS at 21:46

## 2022-02-22 RX ADMIN — Medication 2 SPRAY(S): at 05:20

## 2022-02-22 RX ADMIN — Medication 3 MILLIGRAM(S): at 21:46

## 2022-02-22 RX ADMIN — GABAPENTIN 400 MILLIGRAM(S): 400 CAPSULE ORAL at 21:46

## 2022-02-22 RX ADMIN — GABAPENTIN 400 MILLIGRAM(S): 400 CAPSULE ORAL at 09:02

## 2022-02-22 RX ADMIN — ATORVASTATIN CALCIUM 10 MILLIGRAM(S): 80 TABLET, FILM COATED ORAL at 21:46

## 2022-02-22 RX ADMIN — Medication 5 MILLIGRAM(S): at 05:21

## 2022-02-22 RX ADMIN — Medication 300 MILLIGRAM(S): at 12:22

## 2022-02-22 RX ADMIN — Medication 20 MILLIGRAM(S): at 05:21

## 2022-02-22 RX ADMIN — Medication 2 SPRAY(S): at 21:45

## 2022-02-22 RX ADMIN — Medication 2 SPRAY(S): at 12:24

## 2022-02-22 RX ADMIN — Medication 81 MILLIGRAM(S): at 12:22

## 2022-02-22 RX ADMIN — Medication 125 MICROGRAM(S): at 05:21

## 2022-02-22 RX ADMIN — HEPARIN SODIUM 5000 UNIT(S): 5000 INJECTION INTRAVENOUS; SUBCUTANEOUS at 05:20

## 2022-02-22 NOTE — PROGRESS NOTE ADULT - SUBJECTIVE AND OBJECTIVE BOX
Patient is a 71y old  Female who presents with a chief complaint of Chest pain (22 Feb 2022 08:02)    SUBJECTIVE / OVERNIGHT EVENTS: no acute events overnight, pt went for stress test today     MEDICATIONS  (STANDING):  allopurinol 300 milliGRAM(s) Oral daily  aspirin enteric coated 81 milliGRAM(s) Oral daily  atorvastatin 10 milliGRAM(s) Oral daily  enalapril 5 milliGRAM(s) Oral daily  furosemide    Tablet 20 milliGRAM(s) Oral daily  gabapentin 400 milliGRAM(s) Oral two times a day  heparin   Injectable 5000 Unit(s) SubCutaneous every 8 hours  influenza  Vaccine (HIGH DOSE) 0.7 milliLiter(s) IntraMuscular once  levothyroxine 125 MICROGram(s) Oral daily  sodium chloride 0.65% Nasal 2 Spray(s) Both Nostrils three times a day    MEDICATIONS  (PRN):  acetaminophen     Tablet .. 650 milliGRAM(s) Oral every 6 hours PRN Temp greater or equal to 38C (100.4F), Mild Pain (1 - 3)  melatonin 3 milliGRAM(s) Oral at bedtime PRN Insomnia      Vital Signs Last 24 Hrs  T(C): 36.3 (22 Feb 2022 12:58), Max: 36.8 (21 Feb 2022 17:17)  T(F): 97.3 (22 Feb 2022 12:58), Max: 98.2 (21 Feb 2022 17:17)  HR: 71 (22 Feb 2022 12:58) (60 - 74)  BP: 131/75 (22 Feb 2022 12:58) (97/61 - 131/75)  BP(mean): --  RR: 18 (22 Feb 2022 12:58) (16 - 18)  SpO2: 95% (22 Feb 2022 12:58) (94% - 96%)  CAPILLARY BLOOD GLUCOSE        I&O's Summary    21 Feb 2022 07:01  -  22 Feb 2022 07:00  --------------------------------------------------------  IN: 1140 mL / OUT: 0 mL / NET: 1140 mL    22 Feb 2022 07:01 - 22 Feb 2022 14:28  --------------------------------------------------------  IN: 360 mL / OUT: 0 mL / NET: 360 mL    PHYSICAL EXAM:  GENERAL: NAD  EYES: conjunctiva and sclera clear  NECK: Supple, No JVD  CHEST/LUNG: Clear to auscultation bilaterally; No wheeze  HEART: +S1/S2   ABDOMEN: Soft, Nontender, Nondistended  EXTREMITIES:  no LE edema   PSYCH: AAOx3      LABS:                        10.5   4.38  )-----------( 138      ( 22 Feb 2022 07:25 )             34.7     02-22    140  |  105  |  26<H>  ----------------------------<  89  4.3   |  24  |  0.78    Ca    9.6      22 Feb 2022 07:24    Care Discussed with Consultants/Other Providers: cardiology fellow regarding stress test results

## 2022-02-22 NOTE — PROGRESS NOTE ADULT - SUBJECTIVE AND OBJECTIVE BOX
Patient seen and examined at bedside.    Overnight Events: no acute events overnight.     Review Of Systems: No chest pain, shortness of breath, or palpitations            Current Meds:  acetaminophen     Tablet .. 650 milliGRAM(s) Oral every 6 hours PRN  allopurinol 300 milliGRAM(s) Oral daily  aspirin enteric coated 81 milliGRAM(s) Oral daily  atorvastatin 10 milliGRAM(s) Oral daily  enalapril 5 milliGRAM(s) Oral daily  furosemide    Tablet 20 milliGRAM(s) Oral daily  gabapentin 400 milliGRAM(s) Oral two times a day  heparin   Injectable 5000 Unit(s) SubCutaneous every 8 hours  influenza  Vaccine (HIGH DOSE) 0.7 milliLiter(s) IntraMuscular once  levothyroxine 125 MICROGram(s) Oral daily  melatonin 3 milliGRAM(s) Oral at bedtime PRN  sodium chloride 0.65% Nasal 2 Spray(s) Both Nostrils three times a day      Vitals:  T(F): 97.8 (02-22), Max: 98.2 (02-21)  HR: 60 (02-22) (60 - 98)  BP: 112/70 (02-22) (97/61 - 125/77)  RR: 18 (02-22)  SpO2: 96% (02-22)  I&O's Summary    21 Feb 2022 07:01  -  22 Feb 2022 07:00  --------------------------------------------------------  IN: 1140 mL / OUT: 0 mL / NET: 1140 mL        Physical Exam:  Appearance: No acute distress; well appearing  Eyes: PERRL, EOMI, pink conjunctiva  HEENT: Normal oral mucosa  Cardiovascular: RRR, S1, S2, no murmurs, rubs, or gallops; no edema; no JVD  Respiratory: Clear to auscultation bilaterally  Gastrointestinal: soft, non-tender, non-distended with normal bowel sounds  Musculoskeletal: No clubbing; no joint deformity   Neurologic: Non-focal  Lymphatic: No lymphadenopathy  Psychiatry: AAOx3, mood & affect appropriate  Skin: No rashes, ecchymoses, or cyanosis                          10.5   4.38  )-----------( 138      ( 22 Feb 2022 07:25 )             34.7     02-21    140  |  105  |  23  ----------------------------<  90  4.6   |  25  |  0.85    Ca    9.7      21 Feb 2022 07:14        CARDIAC MARKERS ( 18 Feb 2022 19:10 )  27 ng/L / x     / x     / x     / x     / x      CARDIAC MARKERS ( 17 Feb 2022 19:29 )  29 ng/L / x     / x     / x     / x     / x      CARDIAC MARKERS ( 17 Feb 2022 18:01 )  28 ng/L / x     / x     / x     / x     / x        71F PMH CAD s/p JAC to mCX and JAC to RCA (Nov 2019), HTN, HLD, history of PE in 1974 after knee surgery, remote rheumatic fever, CML on Tasigna (nilotinib, bcr-Abl TKI) who p/w intermittent left-sided chest pain for several days. No active chest pain, negative troponin, EKG unlikely ACS.     1. Chest pain  - awaiting nuclear stress test    - TTE (2/18/22): EF 65%, no wall motion abnormalities    2. CML  - recommend holding Tasigna until chest pain further evaluated due to its association with thrombotic events  - patient received last dose of Tasigna 2/18 AM    3. HTN  - hold diltiazem given potential interaction with nilotinib  -  continue Enalapril   - continue Furosemide 20mg PO qd    4. CAD  - c/w aspirin 81mg qd  - continue statin    5. HLD  - c/w atorvastatin 10mg qd  - lipids wnl  - A1c 5.8   Patient seen and examined at bedside.    Overnight Events: no acute events overnight.     Review Of Systems: No chest pain, shortness of breath, or palpitations            Current Meds:  acetaminophen     Tablet .. 650 milliGRAM(s) Oral every 6 hours PRN  allopurinol 300 milliGRAM(s) Oral daily  aspirin enteric coated 81 milliGRAM(s) Oral daily  atorvastatin 10 milliGRAM(s) Oral daily  enalapril 5 milliGRAM(s) Oral daily  furosemide    Tablet 20 milliGRAM(s) Oral daily  gabapentin 400 milliGRAM(s) Oral two times a day  heparin   Injectable 5000 Unit(s) SubCutaneous every 8 hours  influenza  Vaccine (HIGH DOSE) 0.7 milliLiter(s) IntraMuscular once  levothyroxine 125 MICROGram(s) Oral daily  melatonin 3 milliGRAM(s) Oral at bedtime PRN  sodium chloride 0.65% Nasal 2 Spray(s) Both Nostrils three times a day      Vitals:  T(F): 97.8 (02-22), Max: 98.2 (02-21)  HR: 60 (02-22) (60 - 98)  BP: 112/70 (02-22) (97/61 - 125/77)  RR: 18 (02-22)  SpO2: 96% (02-22)    Physical Exam:  Appearance: No acute distress; well appearing  Eyes: PERRL, EOMI, pink conjunctiva  HEENT: Normal oral mucosa  Cardiovascular: RRR, S1, S2, no murmurs, rubs, or gallops; no edema; no JVD  Respiratory: Clear to auscultation bilaterally  Gastrointestinal: soft, non-tender, non-distended with normal bowel sounds  Musculoskeletal: No clubbing; no joint deformity   Neurologic: Non-focal  Lymphatic: No lymphadenopathy  Psychiatry: AAOx3, mood & affect appropriate  Skin: No rashes, ecchymoses, or cyanosis      LABS:                      10.5   4.38  )-----------( 138      ( 22 Feb 2022 07:25 )             34.7     02-21  140  |  105  |  23  ----------------------------<  90  4.6   |  25  |  0.85    Ca    9.7      21 Feb 2022 07:14    CARDIAC MARKERS ( 18 Feb 2022 19:10 )  27 ng/L / x     / x     / x     / x     / x      CARDIAC MARKERS ( 17 Feb 2022 19:29 )  29 ng/L / x     / x     / x     / x     / x      CARDIAC MARKERS ( 17 Feb 2022 18:01 )  28 ng/L / x     / x     / x     / x     / x

## 2022-02-22 NOTE — PROGRESS NOTE ADULT - ASSESSMENT
71F PMH CAD s/p JAC to mCX and JAC to RCA (Nov 2019), HTN, HLD, history of PE in 1974 after knee surgery, remote rheumatic fever and CML on Tasigna (nilotinib, bcr-Abl TKI).  She p/w intermittent left-sided chest pain for several days. No active chest pain at present; negative troponin, EKG unlikely ACS.       REC:  1. Chest pain  - awaiting nuclear stress test    - TTE (2/18/22): EF 65%, no wall motion abnormalities    2. CML  - recommend holding Tasigna until chest pain further evaluated, due to its association with thrombotic events  - patient received last dose of Tasigna 2/18 AM    3. HTN  - hold diltiazem given potential interaction with nilotinib  - continue enalapril   - continue furosemide 20mg PO qd    4. CAD  - c/w aspirin 81mg qd  - continue statin    5. HLD  - c/w atorvastatin 10mg qd  - lipids wnl  - A1c 5.8      SNEHAL Ott M.D.  Cardiology fellow    Plan discussed with cardiology fellow; patient seen and examined.       I was physically present for the key portions of the evaluation and management (E/M) service provided.    I agree with the above history, physical, and plan which I have reviewed and edited where appropriate.   Victor M Melvin M.D.  Cardiology Attending, Consult Service    For Cardiology consults and questions, all Cardiology service information can be found 24/7 on [email or web address], password: [email or web address]

## 2022-02-23 LAB — SARS-COV-2 RNA SPEC QL NAA+PROBE: SIGNIFICANT CHANGE UP

## 2022-02-23 PROCEDURE — 99152 MOD SED SAME PHYS/QHP 5/>YRS: CPT

## 2022-02-23 PROCEDURE — 93454 CORONARY ARTERY ANGIO S&I: CPT | Mod: 26

## 2022-02-23 PROCEDURE — 99233 SBSQ HOSP IP/OBS HIGH 50: CPT | Mod: GC

## 2022-02-23 PROCEDURE — 99232 SBSQ HOSP IP/OBS MODERATE 35: CPT

## 2022-02-23 RX ADMIN — ATORVASTATIN CALCIUM 10 MILLIGRAM(S): 80 TABLET, FILM COATED ORAL at 22:58

## 2022-02-23 RX ADMIN — Medication 125 MICROGRAM(S): at 06:21

## 2022-02-23 RX ADMIN — Medication 81 MILLIGRAM(S): at 15:08

## 2022-02-23 RX ADMIN — GABAPENTIN 400 MILLIGRAM(S): 400 CAPSULE ORAL at 09:58

## 2022-02-23 RX ADMIN — HEPARIN SODIUM 5000 UNIT(S): 5000 INJECTION INTRAVENOUS; SUBCUTANEOUS at 22:58

## 2022-02-23 RX ADMIN — Medication 300 MILLIGRAM(S): at 15:08

## 2022-02-23 RX ADMIN — Medication 2 SPRAY(S): at 06:20

## 2022-02-23 RX ADMIN — HEPARIN SODIUM 5000 UNIT(S): 5000 INJECTION INTRAVENOUS; SUBCUTANEOUS at 06:21

## 2022-02-23 RX ADMIN — Medication 20 MILLIGRAM(S): at 06:21

## 2022-02-23 RX ADMIN — Medication 3 MILLIGRAM(S): at 22:57

## 2022-02-23 RX ADMIN — HEPARIN SODIUM 5000 UNIT(S): 5000 INJECTION INTRAVENOUS; SUBCUTANEOUS at 15:09

## 2022-02-23 RX ADMIN — Medication 5 MILLIGRAM(S): at 06:21

## 2022-02-23 RX ADMIN — Medication 2 SPRAY(S): at 15:08

## 2022-02-23 RX ADMIN — GABAPENTIN 400 MILLIGRAM(S): 400 CAPSULE ORAL at 22:58

## 2022-02-23 RX ADMIN — Medication 2 SPRAY(S): at 22:58

## 2022-02-23 NOTE — PROGRESS NOTE ADULT - SUBJECTIVE AND OBJECTIVE BOX
Patient is a 71y old  Female who presents with a chief complaint of Chest pain (22 Feb 2022 14:27)     SUBJECTIVE / OVERNIGHT EVENTS: no acute events overnight     MEDICATIONS  (STANDING):  allopurinol 300 milliGRAM(s) Oral daily  aspirin enteric coated 81 milliGRAM(s) Oral daily  atorvastatin 10 milliGRAM(s) Oral daily  enalapril 5 milliGRAM(s) Oral daily  furosemide    Tablet 20 milliGRAM(s) Oral daily  gabapentin 400 milliGRAM(s) Oral two times a day  heparin   Injectable 5000 Unit(s) SubCutaneous every 8 hours  influenza  Vaccine (HIGH DOSE) 0.7 milliLiter(s) IntraMuscular once  levothyroxine 125 MICROGram(s) Oral daily  sodium chloride 0.65% Nasal 2 Spray(s) Both Nostrils three times a day    MEDICATIONS  (PRN):  acetaminophen     Tablet .. 650 milliGRAM(s) Oral every 6 hours PRN Temp greater or equal to 38C (100.4F), Mild Pain (1 - 3)  melatonin 3 milliGRAM(s) Oral at bedtime PRN Insomnia      Vital Signs Last 24 Hrs  T(C): 36.4 (23 Feb 2022 11:19), Max: 36.7 (22 Feb 2022 21:52)  T(F): 97.5 (23 Feb 2022 11:19), Max: 98.1 (22 Feb 2022 21:52)  HR: 63 (23 Feb 2022 13:10) (62 - 73)  BP: 132/71 (23 Feb 2022 13:10) (113/70 - 154/67)  BP(mean): --  RR: 18 (23 Feb 2022 13:10) (16 - 18)  SpO2: 98% (23 Feb 2022 13:10) (93% - 98%)  CAPILLARY BLOOD GLUCOSE        I&O's Summary    22 Feb 2022 07:01  -  23 Feb 2022 07:00  --------------------------------------------------------  IN: 1080 mL / OUT: 0 mL / NET: 1080 mL    23 Feb 2022 07:01  -  23 Feb 2022 13:34  --------------------------------------------------------  IN: 240 mL / OUT: 0 mL / NET: 240 mL      PHYSICAL EXAM:  GENERAL: NAD  EYES: conjunctiva and sclera clear  CHEST/LUNG: no wheezing noted   HEART: +S1/S2   ABDOMEN: Soft, Nontender, Nondistended  EXTREMITIES: no LE edema   PSYCH: AAOx3      LABS:                        10.5   4.38  )-----------( 138      ( 22 Feb 2022 07:25 )             34.7     02-22    140  |  105  |  26<H>  ----------------------------<  89  4.3   |  24  |  0.78    Ca    9.6      22 Feb 2022 07:24

## 2022-02-23 NOTE — PROGRESS NOTE ADULT - SUBJECTIVE AND OBJECTIVE BOX
Patient seen and examined at bedside.    Overnight Events: no acute events overnight     Review Of Systems: No chest pain, shortness of breath, or palpitations            Current Meds:  acetaminophen     Tablet .. 650 milliGRAM(s) Oral every 6 hours PRN  allopurinol 300 milliGRAM(s) Oral daily  aspirin enteric coated 81 milliGRAM(s) Oral daily  atorvastatin 10 milliGRAM(s) Oral daily  enalapril 5 milliGRAM(s) Oral daily  furosemide    Tablet 20 milliGRAM(s) Oral daily  gabapentin 400 milliGRAM(s) Oral two times a day  heparin   Injectable 5000 Unit(s) SubCutaneous every 8 hours  influenza  Vaccine (HIGH DOSE) 0.7 milliLiter(s) IntraMuscular once  levothyroxine 125 MICROGram(s) Oral daily  melatonin 3 milliGRAM(s) Oral at bedtime PRN  sodium chloride 0.65% Nasal 2 Spray(s) Both Nostrils three times a day      Vitals:  T(F): 97.3 (02-23), Max: 98.1 (02-22)  HR: 62 (02-23) (62 - 74)  BP: 122/80 (02-23) (113/70 - 131/75)  RR: 18 (02-23)  SpO2: 96% (02-23)  I&O's Summary    22 Feb 2022 07:01  -  23 Feb 2022 07:00  --------------------------------------------------------  IN: 1080 mL / OUT: 0 mL / NET: 1080 mL        Physical Exam:  Appearance: No acute distress; well appearing  Eyes: PERRL, EOMI, pink conjunctiva  HEENT: Normal oral mucosa  Cardiovascular: RRR, S1, S2, no murmurs, rubs, or gallops; no edema; no JVD  Respiratory: Clear to auscultation bilaterally  Gastrointestinal: soft, non-tender, non-distended with normal bowel sounds  Musculoskeletal: No clubbing; no joint deformity   Neurologic: Non-focal  Lymphatic: No lymphadenopathy  Psychiatry: AAOx3, mood & affect appropriate  Skin: No rashes, ecchymoses, or cyanosis                          10.5   4.38  )-----------( 138      ( 22 Feb 2022 07:25 )             34.7     02-22    140  |  105  |  26<H>  ----------------------------<  89  4.3   |  24  |  0.78    Ca    9.6      22 Feb 2022 07:24        CARDIAC MARKERS ( 18 Feb 2022 19:10 )  27 ng/L / x     / x     / x     / x     / x      CARDIAC MARKERS ( 17 Feb 2022 19:29 )  29 ng/L / x     / x     / x     / x     / x      CARDIAC MARKERS ( 17 Feb 2022 18:01 )  28 ng/L / x     / x     / x     / x     / x         Patient seen and examined at bedside.    Overnight Events: no acute events overnight.  No chest pain, shortness of breath, or palpitations            Current Meds:  acetaminophen     Tablet .. 650 milliGRAM(s) Oral every 6 hours PRN  allopurinol 300 milliGRAM(s) Oral daily  aspirin enteric coated 81 milliGRAM(s) Oral daily  atorvastatin 10 milliGRAM(s) Oral daily  enalapril 5 milliGRAM(s) Oral daily  furosemide    Tablet 20 milliGRAM(s) Oral daily  gabapentin 400 milliGRAM(s) Oral two times a day  heparin   Injectable 5000 Unit(s) SubCutaneous every 8 hours  influenza  Vaccine (HIGH DOSE) 0.7 milliLiter(s) IntraMuscular once  levothyroxine 125 MICROGram(s) Oral daily  melatonin 3 milliGRAM(s) Oral at bedtime PRN  sodium chloride 0.65% Nasal 2 Spray(s) Both Nostrils three times a day    ROS:  General: no fatigue/malaise, weight loss/gain.  Skin: no rashes.  Eyes: no blurred vision, no loss of vision. 	  ENT: no sore throat, rhinorrhea, sinus congestion.  Gastrointestinal:  no N/V/D, no melena/hematemesis/hematochezia.  Genitourinary: no dysuria/hesitancy or hematuria.  Musculoskeletal: no myalgias or arthralgias.  Neurological: no changes in vision or hearing, no lightheadedness/dizziness, no syncope/near syncope	  Psychiatric: no unusual stress/anxiety.   Hematology/Lymphatics: no unusual bleeding, bruising.  All others negative except as stated above and in HPI.     Vitals:  T(F): 97.3 (02-23), Max: 98.1 (02-22)  HR: 62 (02-23) (62 - 74)  BP: 122/80 (02-23) (113/70 - 131/75)  RR: 18 (02-23)  SpO2: 96% (02-23)    Physical Exam:  Appearance: No acute distress; well appearing  Eyes: PERRL, EOMI, pink conjunctiva  HEENT: Normal oral mucosa  Cardiovascular: RRR, S1, S2, no murmurs, rubs, or gallops; no edema; no JVD  Respiratory: Clear to auscultation bilaterally  Gastrointestinal: soft, non-tender, non-distended with normal bowel sounds  Musculoskeletal: No clubbing; no joint deformity   Neurologic: Non-focal  Lymphatic: No lymphadenopathy  Psychiatry: AAOx3, mood & affect appropriate  Skin: No rashes, ecchymoses, or cyanosis      LABS:                      10.5   4.38  )-----------( 138      ( 22 Feb 2022 07:25 )             34.7     02-22  140  |  105  |  26<H>  ----------------------------<  89  4.3   |  24  |  0.78    Ca    9.6      22 Feb 2022 07:24    CARDIAC MARKERS ( 18 Feb 2022 19:10 )  27 ng/L / x     / x     / x     / x     / x      CARDIAC MARKERS ( 17 Feb 2022 19:29 )  29 ng/L / x     / x     / x     / x     / x      CARDIAC MARKERS ( 17 Feb 2022 18:01 )  28 ng/L / x     / x     / x     / x     / x            Nuclear Stress Test-Pharmacologic (02.22.22 @ 13:19)   IMPRESSIONS:Abnormal Study  * Chest Pain: No chest pain with administration of Regadenoson.  * Symptom: Shortness of breath.  * HR Response: Appropriate.  * BP Response: Appropriate.  * Heart Rhythm: Sinus Rhythm - 68 BPM.  * Q Waves: III and aVF.  * Baseline ECG: Nonspecific ST-T wave abnormality.  Poor R wave progression.  * ECG Changes: No significant ischemic ST segment changes beyond baseline abnormalities.  * Arrhythmia: None.  * The left ventricle was normal in size.  * There is a small, moderate defect in the apex that is mostly reversible suggestive of ischemia with partial scarring.  * There are large, mild to moderate defects in the inferior, basal to mid inferoseptal, and distal inferolateral walls that are mostly fixed suggestive of infarct with mild yarelis-infarct ischemia.  * The left ventricular cavity appears to dilate with stress with a calculated TID ratio of 1.19 which is abnormal for this protocol.  * Increased right ventricular tracer uptake is noted on stress imaging.  * Post-stress gated wall motion analysis was performed (LVEF = 64 %;LVEDV = 65 ml.) revealing hypokinesis of the inferior, basal to mid inferoseptal, distal inferolateral, and apical walls.   *** No previous Nuclear/Stress exam.  ------------------------------------------------------------------------  Confirmed on  2/22/2022 - 14:11:37 by Gurjit Miller M.D.  ------------------------------------------------------------------------

## 2022-02-23 NOTE — PROGRESS NOTE ADULT - ASSESSMENT
71F PMH CAD s/p JAC to mCX and JAC to RCA (Nov 2019), HTN, HLD, history of PE in 1974 after knee surgery, remote rheumatic fever and CML on Tasigna (nilotinib, bcr-Abl TKI).  She p/w intermittent left-sided chest pain for several days. No active chest pain at present; negative troponin, EKG unlikely ACS.       REC:  1. Chest pain  - stress test findings reviewed, elevated risk, will discuss [facility] today, patient amenable   - TTE (2/18/22): EF 65%, no wall motion abnormalities    2. CML  - recommend holding Tasigna until chest pain further evaluated, due to its association with thrombotic events  - patient received last dose of Tasigna 2/18 AM    3. HTN  - hold diltiazem given potential interaction with nilotinib  - continue enalapril   - continue furosemide 20mg PO qd    4. CAD  - c/w aspirin 81mg qd  - continue statin    5. HLD  - would increase atorva to 40mg unless significant interaction with other medications   - lipids wnl  - A1c 5.8   71F PMH CAD s/p JAC to mCX and JAC to RCA (Nov 2019), HTN, HLD, history of PE in 1974 after knee surgery, remote rheumatic fever and CML on Tasigna (nilotinib, bcr-Abl TKI).  She p/w intermittent left-sided chest pain for several days. No active chest pain at present; negative troponin, EKG unlikely ACS.       REC:  1. Chest pain  - stress test findings reviewed, elevated risk, will discuss [facility] today to further clarify, given ongoing treatment with Tasigna. Patient amenable   - TTE (2/18/22): EF 65%, no wall motion abnormalities    2. CML  - recommend holding Tasigna until chest pain further/abnormal stress test result further evaluated, due to its association with thrombotic events  - patient received last dose of Tasigna 2/18 AM    3. HTN  - hold diltiazem given potential interaction with nilotinib  - continue enalapril   - continue furosemide 20mg PO qd    4. CAD  - c/w aspirin 81mg qd  - continue statin    5. HLD  - would increase atorva to 40mg unless significant interaction with other medications   - lipids wnl  - A1c 5.8        SNEHAL Almeida M.D.  Cardiology fellow    Plan discussed with cardiology fellow.  I was present for the key portions of the evaluation and management (E/M) service provided.  I agree with the above history, physical, and plan which I have reviewed and edited where appropriate.   Victor M Melvin M.D.  Cardiology Attending, Consult Service    For Cardiology consults and questions, all Cardiology service information can be found 24/7 on [email or web address], password: [email or web address]   71F PMH CAD s/p JAC to mCX and JAC to RCA (Nov 2019), HTN, HLD, history of PE in 1974 after knee surgery, remote rheumatic fever and CML on Tasigna (nilotinib, bcr-Abl TKI).  She p/w intermittent left-sided chest pain for several days. No active chest pain at present; negative troponin, EKG unlikely ACS.       REC:  1. Chest pain  - stress test findings reviewed, elevated risk, s/p LHC with non obstructive disease, no further cardiac testing needed as inpatient, continue ongoing treatment with Tasigna  - can follow up with cardiology as outpatient with Dr Burgos  - TTE (2/18/22): EF 65%, no wall motion abnormalities    2. CML  - continue tasigna as above   - patient received last dose of Tasigna 2/18 AM    3. HTN  - hold diltiazem given potential interaction with nilotinib  - continue enalapril   - continue furosemide 20mg PO qd    4. CAD  - c/w aspirin 81mg qd  - continue statin    5. HLD  - would increase atorva to 40mg unless significant interaction with other medications   - lipids wnl  - A1c 5.8        SNEHAL Almeida M.D.  Cardiology fellow    Plan discussed with cardiology fellow.  I was present for the key portions of the evaluation and management (E/M) service provided.  I agree with the above history, physical, and plan which I have reviewed and edited where appropriate.   Victor M Melvin M.D.  Cardiology Attending, Consult Service    For Cardiology consults and questions, all Cardiology service information can be found 24/7 on [email or web address], password: [email or web address]

## 2022-02-24 ENCOUNTER — TRANSCRIPTION ENCOUNTER (OUTPATIENT)
Age: 72
End: 2022-02-24

## 2022-02-24 VITALS
TEMPERATURE: 97 F | SYSTOLIC BLOOD PRESSURE: 134 MMHG | RESPIRATION RATE: 18 BRPM | HEART RATE: 70 BPM | OXYGEN SATURATION: 97 % | DIASTOLIC BLOOD PRESSURE: 72 MMHG

## 2022-02-24 LAB
ANION GAP SERPL CALC-SCNC: 10 MMOL/L — SIGNIFICANT CHANGE UP (ref 5–17)
BASOPHILS # BLD AUTO: 0.02 K/UL — SIGNIFICANT CHANGE UP (ref 0–0.2)
BASOPHILS NFR BLD AUTO: 0.5 % — SIGNIFICANT CHANGE UP (ref 0–2)
BUN SERPL-MCNC: 31 MG/DL — HIGH (ref 7–23)
CALCIUM SERPL-MCNC: 9.7 MG/DL — SIGNIFICANT CHANGE UP (ref 8.4–10.5)
CHLORIDE SERPL-SCNC: 104 MMOL/L — SIGNIFICANT CHANGE UP (ref 96–108)
CO2 SERPL-SCNC: 27 MMOL/L — SIGNIFICANT CHANGE UP (ref 22–31)
CREAT SERPL-MCNC: 0.94 MG/DL — SIGNIFICANT CHANGE UP (ref 0.5–1.3)
EOSINOPHIL # BLD AUTO: 0.16 K/UL — SIGNIFICANT CHANGE UP (ref 0–0.5)
EOSINOPHIL NFR BLD AUTO: 3.9 % — SIGNIFICANT CHANGE UP (ref 0–6)
GLUCOSE SERPL-MCNC: 90 MG/DL — SIGNIFICANT CHANGE UP (ref 70–99)
HCT VFR BLD CALC: 36.6 % — SIGNIFICANT CHANGE UP (ref 34.5–45)
HGB BLD-MCNC: 11.3 G/DL — LOW (ref 11.5–15.5)
IMM GRANULOCYTES NFR BLD AUTO: 0.2 % — SIGNIFICANT CHANGE UP (ref 0–1.5)
LYMPHOCYTES # BLD AUTO: 1.72 K/UL — SIGNIFICANT CHANGE UP (ref 1–3.3)
LYMPHOCYTES # BLD AUTO: 42.3 % — SIGNIFICANT CHANGE UP (ref 13–44)
MAGNESIUM SERPL-MCNC: 2 MG/DL — SIGNIFICANT CHANGE UP (ref 1.6–2.6)
MCHC RBC-ENTMCNC: 29.7 PG — SIGNIFICANT CHANGE UP (ref 27–34)
MCHC RBC-ENTMCNC: 30.9 GM/DL — LOW (ref 32–36)
MCV RBC AUTO: 96.3 FL — SIGNIFICANT CHANGE UP (ref 80–100)
MONOCYTES # BLD AUTO: 0.37 K/UL — SIGNIFICANT CHANGE UP (ref 0–0.9)
MONOCYTES NFR BLD AUTO: 9.1 % — SIGNIFICANT CHANGE UP (ref 2–14)
NEUTROPHILS # BLD AUTO: 1.79 K/UL — LOW (ref 1.8–7.4)
NEUTROPHILS NFR BLD AUTO: 44 % — SIGNIFICANT CHANGE UP (ref 43–77)
NRBC # BLD: 0 /100 WBCS — SIGNIFICANT CHANGE UP (ref 0–0)
PLATELET # BLD AUTO: 150 K/UL — SIGNIFICANT CHANGE UP (ref 150–400)
POTASSIUM SERPL-MCNC: 4.6 MMOL/L — SIGNIFICANT CHANGE UP (ref 3.5–5.3)
POTASSIUM SERPL-SCNC: 4.6 MMOL/L — SIGNIFICANT CHANGE UP (ref 3.5–5.3)
RBC # BLD: 3.8 M/UL — SIGNIFICANT CHANGE UP (ref 3.8–5.2)
RBC # FLD: 14.2 % — SIGNIFICANT CHANGE UP (ref 10.3–14.5)
SODIUM SERPL-SCNC: 141 MMOL/L — SIGNIFICANT CHANGE UP (ref 135–145)
WBC # BLD: 4.07 K/UL — SIGNIFICANT CHANGE UP (ref 3.8–10.5)
WBC # FLD AUTO: 4.07 K/UL — SIGNIFICANT CHANGE UP (ref 3.8–10.5)

## 2022-02-24 PROCEDURE — 84100 ASSAY OF PHOSPHORUS: CPT

## 2022-02-24 PROCEDURE — 85610 PROTHROMBIN TIME: CPT

## 2022-02-24 PROCEDURE — 99232 SBSQ HOSP IP/OBS MODERATE 35: CPT

## 2022-02-24 PROCEDURE — 93005 ELECTROCARDIOGRAM TRACING: CPT

## 2022-02-24 PROCEDURE — 84484 ASSAY OF TROPONIN QUANT: CPT

## 2022-02-24 PROCEDURE — 85027 COMPLETE CBC AUTOMATED: CPT

## 2022-02-24 PROCEDURE — C1887: CPT

## 2022-02-24 PROCEDURE — 80061 LIPID PANEL: CPT

## 2022-02-24 PROCEDURE — A9500: CPT

## 2022-02-24 PROCEDURE — 96374 THER/PROPH/DIAG INJ IV PUSH: CPT

## 2022-02-24 PROCEDURE — 85025 COMPLETE CBC W/AUTO DIFF WBC: CPT

## 2022-02-24 PROCEDURE — 93454 CORONARY ARTERY ANGIO S&I: CPT

## 2022-02-24 PROCEDURE — U0003: CPT

## 2022-02-24 PROCEDURE — 80048 BASIC METABOLIC PNL TOTAL CA: CPT

## 2022-02-24 PROCEDURE — 80053 COMPREHEN METABOLIC PANEL: CPT

## 2022-02-24 PROCEDURE — U0005: CPT

## 2022-02-24 PROCEDURE — G0378: CPT

## 2022-02-24 PROCEDURE — 83036 HEMOGLOBIN GLYCOSYLATED A1C: CPT

## 2022-02-24 PROCEDURE — 99232 SBSQ HOSP IP/OBS MODERATE 35: CPT | Mod: GC

## 2022-02-24 PROCEDURE — C1894: CPT

## 2022-02-24 PROCEDURE — 83735 ASSAY OF MAGNESIUM: CPT

## 2022-02-24 PROCEDURE — 85730 THROMBOPLASTIN TIME PARTIAL: CPT

## 2022-02-24 PROCEDURE — 93306 TTE W/DOPPLER COMPLETE: CPT

## 2022-02-24 PROCEDURE — 99152 MOD SED SAME PHYS/QHP 5/>YRS: CPT

## 2022-02-24 PROCEDURE — 93017 CV STRESS TEST TRACING ONLY: CPT

## 2022-02-24 PROCEDURE — 78452 HT MUSCLE IMAGE SPECT MULT: CPT

## 2022-02-24 PROCEDURE — C1769: CPT

## 2022-02-24 PROCEDURE — 36415 COLL VENOUS BLD VENIPUNCTURE: CPT

## 2022-02-24 PROCEDURE — 99285 EMERGENCY DEPT VISIT HI MDM: CPT

## 2022-02-24 PROCEDURE — 71045 X-RAY EXAM CHEST 1 VIEW: CPT

## 2022-02-24 RX ADMIN — Medication 20 MILLIGRAM(S): at 05:45

## 2022-02-24 RX ADMIN — Medication 125 MICROGRAM(S): at 05:45

## 2022-02-24 RX ADMIN — GABAPENTIN 400 MILLIGRAM(S): 400 CAPSULE ORAL at 09:57

## 2022-02-24 RX ADMIN — HEPARIN SODIUM 5000 UNIT(S): 5000 INJECTION INTRAVENOUS; SUBCUTANEOUS at 13:05

## 2022-02-24 RX ADMIN — Medication 2 SPRAY(S): at 05:45

## 2022-02-24 RX ADMIN — Medication 5 MILLIGRAM(S): at 05:45

## 2022-02-24 RX ADMIN — Medication 81 MILLIGRAM(S): at 11:03

## 2022-02-24 RX ADMIN — Medication 300 MILLIGRAM(S): at 11:03

## 2022-02-24 RX ADMIN — HEPARIN SODIUM 5000 UNIT(S): 5000 INJECTION INTRAVENOUS; SUBCUTANEOUS at 05:46

## 2022-02-24 RX ADMIN — Medication 2 SPRAY(S): at 13:05

## 2022-02-24 NOTE — DISCHARGE NOTE NURSING/CASE MANAGEMENT/SOCIAL WORK - NSDCPEFALRISK_GEN_ALL_CORE
For information on Fall & Injury Prevention, visit: [email or web address] OR  [email or web address] OR  https://www.cdc.gov/steadi/patient.html

## 2022-02-24 NOTE — PROGRESS NOTE ADULT - SUBJECTIVE AND OBJECTIVE BOX
Patient seen and examined at bedside.    Overnight Events: No acute events     Review Of Systems: No chest pain, shortness of breath, or palpitations            Current Meds:  acetaminophen     Tablet .. 650 milliGRAM(s) Oral every 6 hours PRN  allopurinol 300 milliGRAM(s) Oral daily  aspirin enteric coated 81 milliGRAM(s) Oral daily  atorvastatin 10 milliGRAM(s) Oral daily  enalapril 5 milliGRAM(s) Oral daily  furosemide    Tablet 20 milliGRAM(s) Oral daily  gabapentin 400 milliGRAM(s) Oral two times a day  heparin   Injectable 5000 Unit(s) SubCutaneous every 8 hours  influenza  Vaccine (HIGH DOSE) 0.7 milliLiter(s) IntraMuscular once  levothyroxine 125 MICROGram(s) Oral daily  melatonin 3 milliGRAM(s) Oral at bedtime PRN  sodium chloride 0.65% Nasal 2 Spray(s) Both Nostrils three times a day      Vitals:  T(F): 98.1 (02-24), Max: 98.1 (02-24)  HR: 58 (02-24) (58 - 78)  BP: 127/76 (02-24) (95/61 - 154/67)  RR: 18 (02-24)  SpO2: 96% (02-24)  I&O's Summary    23 Feb 2022 07:01  -  24 Feb 2022 07:00  --------------------------------------------------------  IN: 720 mL / OUT: 0 mL / NET: 720 mL        Physical Exam:  Appearance: No acute distress; well appearing  Eyes: PERRL, EOMI, pink conjunctiva  HEENT: Normal oral mucosa  Cardiovascular: RRR, S1, S2, no murmurs, rubs, or gallops; no edema; no JVD  Respiratory: Clear to auscultation bilaterally  Gastrointestinal: soft, non-tender, non-distended with normal bowel sounds  Musculoskeletal: No clubbing; no joint deformity   Neurologic: Non-focal  Lymphatic: No lymphadenopathy  Psychiatry: AAOx3, mood & affect appropriate  Skin: No rashes, ecchymoses, or cyanosis    CARDIAC MARKERS ( 18 Feb 2022 19:10 )  27 ng/L / x     / x     / x     / x     / x      CARDIAC MARKERS ( 17 Feb 2022 19:29 )  29 ng/L / x     / x     / x     / x     / x      CARDIAC MARKERS ( 17 Feb 2022 18:01 )  28 ng/L / x     / x     / x     / x     / x           Patient seen and examined at bedside.    Overnight Events: No acute events.  Reports no chest pain, shortness of breath, or palpitations            Current Meds:  acetaminophen     Tablet .. 650 milliGRAM(s) Oral every 6 hours PRN  allopurinol 300 milliGRAM(s) Oral daily  aspirin enteric coated 81 milliGRAM(s) Oral daily  atorvastatin 10 milliGRAM(s) Oral daily  enalapril 5 milliGRAM(s) Oral daily  furosemide    Tablet 20 milliGRAM(s) Oral daily  gabapentin 400 milliGRAM(s) Oral two times a day  heparin   Injectable 5000 Unit(s) SubCutaneous every 8 hours  influenza  Vaccine (HIGH DOSE) 0.7 milliLiter(s) IntraMuscular once  levothyroxine 125 MICROGram(s) Oral daily  melatonin 3 milliGRAM(s) Oral at bedtime PRN  sodium chloride 0.65% Nasal 2 Spray(s) Both Nostrils three times a day    ROS:  General: no fatigue/malaise, weight loss/gain.  Skin: no rashes.  Eyes: no blurred vision, no loss of vision. 	  ENT: no sore throat, rhinorrhea, sinus congestion.  Gastrointestinal:  no N/V/D, no melena/hematemesis/hematochezia.  Genitourinary: no dysuria/hesitancy or hematuria.  Musculoskeletal: no myalgias or arthralgias.  Neurological: no changes in vision or hearing, no lightheadedness/dizziness, no syncope/near syncope	  Psychiatric: no unusual stress/anxiety.   Hematology/Lymphatics: no unusual bleeding, bruising.  All others negative except as stated above and in HPI.       Vitals:  T(F): 98.1 (02-24), Max: 98.1 (02-24)  HR: 58 (02-24) (58 - 78)  BP: 127/76 (02-24) (95/61 - 154/67)  RR: 18 (02-24)  SpO2: 96% (02-24)    Physical Exam:  Appearance: No acute distress; well appearing  Eyes: PERRL, EOMI, pink conjunctiva  HEENT: Normal oral mucosa  Cardiovascular: RRR, S1, S2, no murmurs, rubs, or gallops; no edema; no JVD  Respiratory: Clear to auscultation bilaterally  Gastrointestinal: soft, non-tender, non-distended with normal bowel sounds  Musculoskeletal: No clubbing; no joint deformity   Neurologic: Non-focal  Lymphatic: No lymphadenopathy  Psychiatry: AAOx3, mood & affect appropriate  Skin: No rashes, ecchymoses, or cyanosis      LABS:  CARDIAC MARKERS ( 18 Feb 2022 19:10 )  27 ng/L / x     / x     / x     / x     / x      CARDIAC MARKERS ( 17 Feb 2022 19:29 )  29 ng/L / x     / x     / x     / x     / x      CARDIAC MARKERS ( 17 Feb 2022 18:01 )  28 ng/L / x     / x     / x     / x     / x          Cardiac Catheterization (02.23.22 @ 12:20)   LM - Leftmain artery: Angiography shows no disease.    LAD  - Left anterior descending artery: The segment is moderately calcified. There is a 30 % stenosis in the proximal third portion of the segment.    CX  - Circumflex: patent stents . There is a 30 %stenosis in the distal third portion of the segment at the distal margin of the stented segment.    RCA - Right coronary artery: patent stents . There is a 40 % stenosis in the distal third portion of the segment within the stented segment.    Diagnostic Conclusions:   Diagnostic cath showed mild CAD and patent stents from previous procedures. The patient has stents in the RCA with mild in-stent restenosis. There is also a LCx stent with signs of mild CAD distal to the stent. The LAD is calcified and has mild disease with no flow limiting lesions.    Recommendations:   Patient management should include aggressive medical therapy and an exercise program.

## 2022-02-24 NOTE — PROGRESS NOTE ADULT - PROBLEM SELECTOR PLAN 4
Hold tasigna last dose 2/18 in the setting of ischemic workup per cards
C/w lipitor
Hold tasigna last dose 2/18 in the setting of ischemic workup per cards
C/w lipitor
Hold tasigna last dose 2/18 in the setting of ischemic workup per cards
resume tasigna on discharge

## 2022-02-24 NOTE — PROGRESS NOTE ADULT - PROBLEM SELECTOR PLAN 2
C/w enalapril and lasix    Continue to monitor BP
C/w enalapril and lasix    Continue to monitor BP
Enalapril increased to 5mg   C/w lasix    Continue to monitor BP

## 2022-02-24 NOTE — DISCHARGE NOTE PROVIDER - NSDCMRMEDTOKEN_GEN_ALL_CORE_FT
allopurinol 300 mg oral tablet: 1 tab(s) orally once a day  Aspir 81 oral delayed release tablet: 1 tab(s) orally once a day  enalapril 5 mg oral tablet: 1 tab(s) orally once a day  furosemide 20 mg oral tablet: 1 tab(s) orally once a day  gabapentin 100 mg oral capsule: 4 cap(s) orally 2 times a day  Klor-Con M20 oral tablet, extended release:  orally once a day  levothyroxine 125 mcg (0.125 mg) oral capsule: 1 cap(s) orally once a day  lovastatin 20 mg oral tablet: 1 tab(s) orally once a day

## 2022-02-24 NOTE — PROGRESS NOTE ADULT - SUBJECTIVE AND OBJECTIVE BOX
Patient is a 71y old  Female who presents with a chief complaint of Chest pain (24 Feb 2022 11:54)    SUBJECTIVE / OVERNIGHT EVENTS: no acute events overnight, had cath, feeling well today     MEDICATIONS  (STANDING):  allopurinol 300 milliGRAM(s) Oral daily  aspirin enteric coated 81 milliGRAM(s) Oral daily  atorvastatin 10 milliGRAM(s) Oral daily  enalapril 5 milliGRAM(s) Oral daily  furosemide    Tablet 20 milliGRAM(s) Oral daily  gabapentin 400 milliGRAM(s) Oral two times a day  heparin   Injectable 5000 Unit(s) SubCutaneous every 8 hours  influenza  Vaccine (HIGH DOSE) 0.7 milliLiter(s) IntraMuscular once  levothyroxine 125 MICROGram(s) Oral daily  sodium chloride 0.65% Nasal 2 Spray(s) Both Nostrils three times a day    MEDICATIONS  (PRN):  acetaminophen     Tablet .. 650 milliGRAM(s) Oral every 6 hours PRN Temp greater or equal to 38C (100.4F), Mild Pain (1 - 3)  melatonin 3 milliGRAM(s) Oral at bedtime PRN Insomnia      Vital Signs Last 24 Hrs  T(C): 36.6 (24 Feb 2022 13:52), Max: 36.7 (24 Feb 2022 05:38)  T(F): 97.8 (24 Feb 2022 13:52), Max: 98.1 (24 Feb 2022 05:38)  HR: 70 (24 Feb 2022 13:52) (58 - 78)  BP: 120/71 (24 Feb 2022 13:52) (95/61 - 127/76)  BP(mean): --  RR: 18 (24 Feb 2022 13:52) (18 - 18)  SpO2: 94% (24 Feb 2022 13:52) (94% - 97%)  CAPILLARY BLOOD GLUCOSE        I&O's Summary    23 Feb 2022 07:01  -  24 Feb 2022 07:00  --------------------------------------------------------  IN: 720 mL / OUT: 0 mL / NET: 720 mL    24 Feb 2022 07:01  -  24 Feb 2022 14:29  --------------------------------------------------------  IN: 480 mL / OUT: 0 mL / NET: 480 mL    PHYSICAL EXAM:  GENERAL: NAD  EYES: conjunctiva and sclera clear  CHEST/LUNG: no wheezing   HEART: +S1/S2   ABDOMEN: Soft, Nontender, Nondistended  EXTREMITIES:  no LE edema, R radial dressing in place at cath access site C/D/I, no bleeding   PSYCH: AAOx3      LABS:                        11.3   4.07  )-----------( 150      ( 24 Feb 2022 07:28 )             36.6     02-24    141  |  104  |  31<H>  ----------------------------<  90  4.6   |  27  |  0.94    Ca    9.7      24 Feb 2022 07:28  Mg     2.0     02-24        Care Discussed with Consultants/Other Providers: discussed with cardiology fellow regarding discharge

## 2022-02-24 NOTE — DISCHARGE NOTE PROVIDER - NSDCCPCAREPLAN_GEN_ALL_CORE_FT
PRINCIPAL DISCHARGE DIAGNOSIS  Diagnosis: Chest pain  Assessment and Plan of Treatment: 71F PMH CAD s/p JAC to mCX and JAC to RCA (Nov 2019), HTN, HLD, history of PE in 1974 after knee surgery, remote rheumatic fever and CML on Tasigna (nilotinib, bcr-Abl TKI).  She p/w intermittent left-sided chest pain for several days. No active chest pain at present; negative troponin, EKG unlikely ACS.   REC:   Chest pain  - stress test findings reviewed, elevated risk, s/p LHC with non obstructive disease, no further cardiac testing needed as inpatient, continue ongoing treatment with Tasigna  - can follow up with cardiology as outpatient with Dr Burgos  - TTE (2/18/22): EF 65%, no wall motion abnormalities   CAD  - c/w aspirin 81mg qd  - continue statin   HLD  - would increase atorva to 40mg unless significant interaction with other medications   - lipids wnl  - A1c 5.8        SECONDARY DISCHARGE DIAGNOSES  Diagnosis: Hypertension  Assessment and Plan of Treatment: HTN  - hold diltiazem given potential interaction with nilotinib  - continue enalapril   - continue furosemide 20mg PO qd      Diagnosis: Chronic myeloid leukemia  Assessment and Plan of Treatment: please folow up with oncology,   2. CML  - continue tasigna as above   - patient received last dose of Tasigna 2/18 AM      Diagnosis: Hypothyroid  Assessment and Plan of Treatment: c/w synthyroid     PRINCIPAL DISCHARGE DIAGNOSIS  Diagnosis: Chest pain  Assessment and Plan of Treatment: 71F PMH CAD s/p JAC to mCX and JAC to RCA (Nov 2019), HTN, HLD, history of PE in 1974 after knee surgery, remote rheumatic fever and CML on Tasigna (nilotinib, bcr-Abl TKI).  She p/w intermittent left-sided chest pain for several days. No active chest pain at present; negative troponin, EKG unlikely ACS.   REC:   Chest pain  - stress test findings reviewed, elevated risk, s/p LHC with non obstructive disease, no further cardiac testing needed as inpatient, continue ongoing treatment with Tasigna  - can follow up with cardiology as outpatient with Dr Burgos  - TTE (2/18/22): EF 65%, no wall motion abnormalities   CAD  - c/w aspirin 81mg qd  - continue statin   HLD  - would increase atorva to 40mg unless significant interaction with other medications   - lipids wnl  - A1c 5.8        SECONDARY DISCHARGE DIAGNOSES  Diagnosis: Hypertension  Assessment and Plan of Treatment: HTN  - hold diltiazem given potential interaction with nilotinib  - continue enalapril   - continue furosemide 20mg PO qd      Diagnosis: Chronic myeloid leukemia  Assessment and Plan of Treatment: please folow up with oncology,   2. CML  - continue tasigna   - patient received last dose of Tasigna 2/18 AM      Diagnosis: Hypothyroid  Assessment and Plan of Treatment: c/w synthyroid

## 2022-02-24 NOTE — PROGRESS NOTE ADULT - ASSESSMENT
71F PMH CAD s/p JAC to mCX and JAC to RCA (Nov 2019), HTN, HLD, history of PE in 1974 after knee surgery, remote rheumatic fever and CML on Tasigna (nilotinib, bcr-Abl TKI).  She p/w intermittent left-sided chest pain for several days. No active chest pain at present; negative troponin, EKG unlikely ACS.       REC:  1. Chest pain  - stress test findings reviewed, elevated risk, s/p LHC with non obstructive disease, no further cardiac testing needed as inpatient, continue ongoing treatment with Tasigna  - can follow up with cardiology as outpatient with Dr Burgos  - TTE (2/18/22): EF 65%, no wall motion abnormalities    2. CML  - continue tasigna as above   - patient received last dose of Tasigna 2/18 AM    3. HTN  - hold diltiazem given potential interaction with nilotinib  - continue enalapril   - continue furosemide 20mg PO qd    4. CAD  - c/w aspirin 81mg qd  - continue statin    5. HLD  - would increase atorva to 40mg unless significant interaction with other medications   - lipids wnl  - A1c 5.8        SNEHAL Almeida M.D.  Cardiology fellow   71F PMH CAD s/p JAC to mCX and JAC to RCA (Nov 2019), HTN, HLD, history of PE in 1974 after knee surgery, remote rheumatic fever and CML on Tasigna (nilotinib, bcr-Abl TKI).  She p/w intermittent left-sided chest pain for several days. No active chest pain at present; negative troponin, EKG unlikely ACS.       REC:  1. Chest pain  - s/p LHC which shows non-obstructive disease.  No further cardiac testing needed as inpatient; she may continue ongoing treatment with Tasigna  - can follow up with cardiology as outpatient with Dr Burgos  - TTE (2/18/22): EF 65%, no wall motion abnormalities    2. CML  - continue Tasigna as above   - patient received last dose of Tasigna 2/18 AM    3. HTN  - hold diltiazem given potential interaction with nilotinib  - continue enalapril   - continue furosemide 20mg PO qd    4. CAD  - c/w aspirin 81mg qd  - continue statin    5. HLD  - would increase atorvastatin to 40mg unless significant interaction with other medications   - lipids wnl  - A1c 5.8      SNEHAL Almeida M.D.  Cardiology fellow    Plan discussed with cardiology fellow.  I was present for the key portions of the evaluation and management (E/M) service provided.  I agree with the above history, physical, and plan which I have reviewed and edited where appropriate.   Victor M Melvin M.D.  Cardiology Attending, Consult Service    For Cardiology consults and questions, all Cardiology service information can be found 24/7 on [email or web address], password: [email or web address]

## 2022-02-24 NOTE — DISCHARGE NOTE NURSING/CASE MANAGEMENT/SOCIAL WORK - PATIENT PORTAL LINK FT
You can access the FollowMyHealth Patient Portal offered by [facility] by registering at the following website: [email or web address]. By joining [facility]’s FollowMyHealth portal, you will also be able to view your health information using other applications (apps) compatible with our system.

## 2022-02-24 NOTE — PROGRESS NOTE ADULT - PROBLEM SELECTOR PLAN 1
EKG sinus bradycardia 55bpm   TTE (2/18/22): EF 65%, no wall motion abnormalities  Serial troponins   Continue to monitor on tele   C/w asprin 81mg   Check nuclear stress test   Cards eval
EKG sinus bradycardia 55bpm   TTE (2/18/22): EF 65%, no wall motion abnormalities  Serial troponins wnl  Continue to monitor on tele   C/w asprin 81mg   nuclear stress test with defects noted, discussed with cardiology fellow, likely will need cardiac cath   to let us know the final plan
EKG sinus bradycardia 55bpm   TTE (2/18/22): EF 65%, no wall motion abnormalities  Serial troponins wnl  Continue to monitor on tele   C/w asprin 81mg   nuclear stress test with defects noted, discussed with cardiology fellow, likely will need cardiac cath   planned for cath today, f/u results
EKG sinus bradycardia 55bpm   TTE (2/18/22): EF 65%, no wall motion abnormalities  Serial troponins wnl  Continue to monitor on tele   C/w asprin 81mg   Check nuclear stress test   Cards eval
EKG sinus bradycardia 55bpm   TTE (2/18/22): EF 65%, no wall motion abnormalities  Serial troponins wnl  Continue to monitor on tele   C/w asprin 81mg   Check nuclear stress test   Cards eval
EKG sinus bradycardia 55bpm   TTE (2/18/22): EF 65%, no wall motion abnormalities  Serial troponins wnl  Continue to monitor on tele   C/w asprin 81mg   nuclear stress test with defects noted, had cath done on 2/23 which was non-obstructive  no further chest pain, completely resolved  cleared for discharge home, discussed with cardiology fellow   outpt f/u with cardiologist in 2 weeks

## 2022-02-24 NOTE — PROGRESS NOTE ADULT - PROBLEM SELECTOR PLAN 5
C/w synthroid 125mcg
C/w synthroid 125mcg
Hep sq  Hold home med Tasigna for CML per cards
Hep sq  Hold home med Tasigna for CML per cards until pending ischemic workup
C/w synthroid 125mcg
C/w synthroid 125mcg

## 2022-02-24 NOTE — DISCHARGE NOTE PROVIDER - CARE PROVIDER_API CALL
Chip Shah)  Cardiovascular Disease; Internal Medicine  [address] [place] [address], [address] [id number]  Phone: [phone]  Fax: [phone]  Follow Up Time: 1 week

## 2022-02-24 NOTE — PROGRESS NOTE ADULT - PROBLEM SELECTOR PROBLEM 4
Chronic myeloid leukemia
Chronic myeloid leukemia
Hyperlipidemia
Hyperlipidemia
Chronic myeloid leukemia
Chronic myeloid leukemia

## 2022-02-24 NOTE — DISCHARGE NOTE PROVIDER - NSDCFUSCHEDAPPT_GEN_ALL_CORE_FT
EZEQUIEL WRIGHT ; 03/03/2022 ; [facility] [address]  EZEQUIEL WRIGHT ; 03/15/2022 ; [facility] [address]

## 2022-02-24 NOTE — PROGRESS NOTE ADULT - PROBLEM SELECTOR PLAN 7
Hep sq  Dispo- Home    D/w partner at bedside
Hep sq  Dispo- Home when ready
Hep sq  Dispo- Home when ready - hopefully tomorrow
Hep sq  D/C home today

## 2022-02-24 NOTE — DISCHARGE NOTE PROVIDER - HOSPITAL COURSE
71F PMH CAD s/p JAC to mCX and JAC to RCA (Nov 2019), HTN, HLD, history of PE in 1974 after knee surgery, remote rheumatic fever and CML on Tasigna (nilotinib, bcr-Abl TKI).  She p/w intermittent left-sided chest pain for several days. No active chest pain at present; negative troponin, EKG unlikely ACS.       REC:  1. Chest pain  - stress test findings reviewed, elevated risk, s/p LHC with non obstructive disease, no further cardiac testing needed as inpatient, continue ongoing treatment with Tasigna  - can follow up with cardiology as outpatient with Dr Burgos  - TTE (2/18/22): EF 65%, no wall motion abnormalities    2. CML  - continue tasigna as above   - patient received last dose of Tasigna 2/18 AM    3. HTN  - hold diltiazem given potential interaction with nilotinib  - continue enalapril   - continue furosemide 20mg PO qd    4. CAD  - c/w aspirin 81mg qd  - continue statin    5. HLD  - would increase atorva to 40mg unless significant interaction with other medications   - lipids wnl  - A1c 5.8         71F PMH CAD s/p JAC to mCX and JAC to RCA (Nov 2019), HTN, HLD, history of PE in 1974 after knee surgery, remote rheumatic fever and CML on Tasigna (nilotinib, bcr-Abl TKI).  She p/w intermittent left-sided chest pain for several days. No active chest pain at present; negative troponin, EKG unlikely ACS.       REC:  1. Chest pain  - stress test findings reviewed, elevated risk, s/p LHC with non obstructive disease, no further cardiac testing needed as inpatient, continue ongoing treatment with Tasigna  - can follow up with cardiology as outpatient with Dr Burgos  - TTE (2/18/22): EF 65%, no wall motion abnormalities    2. CML  - resume tasigna   - patient received last dose of Tasigna 2/18 AM    3. HTN  - hold diltiazem given potential interaction with nilotinib  - continue enalapril   - continue furosemide 20mg PO qd    4. CAD  - c/w aspirin 81mg qd  - continue statin    5. HLD  - would increase atorva to 40mg unless significant interaction with other medications   - lipids wnl  - A1c 5.8

## 2022-02-25 ENCOUNTER — NON-APPOINTMENT (OUTPATIENT)
Age: 72
End: 2022-02-25

## 2022-02-25 RX ORDER — SODIUM SULFATE, POTASSIUM SULFATE, MAGNESIUM SULFATE 17.5; 3.13; 1.6 G/ML; G/ML; G/ML
17.5-3.13-1.6 SOLUTION, CONCENTRATE ORAL
Refills: 0 | Status: COMPLETED | COMMUNITY
Start: 2019-11-20 | End: 2022-02-25

## 2022-02-25 RX ORDER — FLUTICASONE FUROATE 27.5 UG/1
27.5 SPRAY, METERED NASAL DAILY
Qty: 1 | Refills: 0 | Status: COMPLETED | COMMUNITY
Start: 2019-11-20 | End: 2022-02-25

## 2022-02-25 RX ORDER — PEN NEEDLE, DIABETIC 32GX 5/32"
NEEDLE, DISPOSABLE MISCELLANEOUS
Qty: 90 | Refills: 5 | Status: COMPLETED | OUTPATIENT
Start: 2020-06-15 | End: 2022-02-25

## 2022-02-25 RX ORDER — BLOOD PRESSURE TEST KIT-LARGE
KIT MISCELLANEOUS
Qty: 1 | Refills: 0 | Status: COMPLETED | COMMUNITY
Start: 2018-10-25 | End: 2022-02-25

## 2022-02-25 RX ORDER — POTASSIUM CHLORIDE 1500 MG/1
20 TABLET, FILM COATED, EXTENDED RELEASE ORAL DAILY
Refills: 0 | Status: ACTIVE | COMMUNITY

## 2022-02-25 RX ORDER — OXYCODONE AND ACETAMINOPHEN 10; 325 MG/1; MG/1
10-325 TABLET ORAL EVERY 4 HOURS
Qty: 42 | Refills: 0 | Status: COMPLETED | COMMUNITY
Start: 2018-07-12 | End: 2022-02-25

## 2022-02-25 RX ORDER — NEOMYCIN SULFATE, POLYMYXIN B SULFATE, HYDROCORTISONE 3.5; 10000; 1 MG/ML; [USP'U]/ML; MG/ML
1 SOLUTION/ DROPS AURICULAR (OTIC)
Qty: 10 | Refills: 1 | Status: COMPLETED | COMMUNITY
Start: 2019-03-05 | End: 2022-02-25

## 2022-02-25 RX ORDER — BLOOD-GLUCOSE METER
EACH MISCELLANEOUS
Qty: 30 | Refills: 3 | Status: COMPLETED | OUTPATIENT
Start: 2020-06-15 | End: 2022-02-25

## 2022-02-28 ENCOUNTER — RX RENEWAL (OUTPATIENT)
Age: 72
End: 2022-02-28

## 2022-03-03 ENCOUNTER — APPOINTMENT (OUTPATIENT)
Dept: FAMILY MEDICINE | Facility: CLINIC | Age: 72
End: 2022-03-03
Payer: MEDICARE

## 2022-03-03 VITALS
TEMPERATURE: 97.9 F | DIASTOLIC BLOOD PRESSURE: 64 MMHG | HEART RATE: 77 BPM | OXYGEN SATURATION: 96 % | HEIGHT: 60 IN | RESPIRATION RATE: 14 BRPM | WEIGHT: 142 LBS | SYSTOLIC BLOOD PRESSURE: 122 MMHG | BODY MASS INDEX: 27.88 KG/M2

## 2022-03-03 PROCEDURE — 99496 TRANSJ CARE MGMT HIGH F2F 7D: CPT

## 2022-03-03 RX ORDER — GABAPENTIN 400 MG/1
400 CAPSULE ORAL TWICE DAILY
Qty: 180 | Refills: 3 | Status: ACTIVE | COMMUNITY
Start: 2022-03-03 | End: 1900-01-01

## 2022-03-03 NOTE — ASSESSMENT
[FreeTextEntry1] : Assessment and plan:\par \par 1.  Status post hospitalization atypical chest discomfort questionable abnormal stress test cardiac catheterization done no acute findings.  Patient will continue present medical management and follow-up with cardiology.\par \par 2. CML she is stable and will be following up with hematology oncology.  Leukocytosis has resolved with treatment.  Patient will continue Tasigna 150 mg 4 times a day.\par \par 3.  Ischemic heart disease status post stent Plavix has been discontinued and patient will continue aspirin 81 mg p.o.\par \par 4.  Hypertension excellent blood pressure control patient will continue diltiazem 240 mg, enalapril 2.5 mg and Lasix 20 mg.\par \par 5.  Hyperuricemia continue allopurinol, check uric acid levels.\par \par 6.  Hyperlipidemia patient following low-fat low-cholesterol diet and also weight loss program continue lovastatin 20 mg p.o. daily\par \par 7.  Hypothyroidism patient tolerating and doing well with levothyroxine 125 mcg.\par \par 8.  Peripheral vascular disease patient no longer complaining of claudication she has been evaluated by vascular surgery  she is doing extremely well and is stable at this time no further work-up at this time.\par \par 9.  Chronic renal insufficiency stage III stable for patient.  Patient is totally asymptomatic.\par \par 10.  Dizziness by physical exam it appears that the patient has benign positional vertigo we will attempt meclizine 12.5 mg up to 3 times a day as needed for vertigo.  Patient has some hard cerumen bilaterally I recommend ENT evaluation.

## 2022-03-03 NOTE — HISTORY OF PRESENT ILLNESS
[Post-hospitalization from ___ Hospital] : Post-hospitalization from [unfilled] Hospital [Admitted on: ___] : The patient was admitted on [unfilled] [Discharged on ___] : discharged on [unfilled] [Discharge Summary] : discharge summary [Pertinent Labs] : pertinent labs [Radiology Findings] : radiology findings [Discharge Med List] : discharge medication list [Other: ____] : [unfilled] [Med Reconciliation] : medication reconciliation has been completed [Patient Contacted By: ____] : and contacted by [unfilled] [FreeTextEntry2] : Patient is a 71-year-old female who presents today for follow-up status post hospitalization.  Patient was hospitalized as of February 18 and subsequently discharged on February 24, 2022 she was admitted at [facility] [place] diagnosis atypical chest pain.  Patient underwent cardiac catheterization which showed nonobstructive ischemic heart disease.  Patient's medical history is significant for CML, hypertension, ischemic heart disease status post stent placement, hyperlipidemia and chronic kidney disease stage III which is stable patient has been doing extremely well.  Presently the patient is awake alert and oriented x3 she is in no acute distress calm and cooperative.  Presently patient denies any active chest pain.  She does admit to GERD-like symptoms complaining of esophageal burning when she swallows it worsens postprandially.

## 2022-03-03 NOTE — PHYSICAL EXAM
[No Acute Distress] : no acute distress [Well Nourished] : well nourished [Well Developed] : well developed [Normal Voice/Communication] : normal voice/communication [Ill-Appearing] : ill-appearing [Normal Sclera/Conjunctiva] : normal sclera/conjunctiva [PERRL] : pupils equal round and reactive to light [No JVD] : no jugular venous distention [No Lymphadenopathy] : no lymphadenopathy [Supple] : supple [Normal Rate] : normal rate  [Regular Rhythm] : with a regular rhythm [Normal S1, S2] : normal S1 and S2 [No Carotid Bruits] : no carotid bruits [No Abdominal Bruit] : a ~M bruit was not heard ~T in the abdomen [No Varicosities] : no varicosities [Pedal Pulses Present] : the pedal pulses are present [No Edema] : there was no peripheral edema [No Palpable Aorta] : no palpable aorta [No Extremity Clubbing/Cyanosis] : no extremity clubbing/cyanosis [Soft] : abdomen soft [Non-distended] : non-distended [No Masses] : no abdominal mass palpated [Normal Bowel Sounds] : normal bowel sounds [No Joint Swelling] : no joint swelling [Coordination Grossly Intact] : coordination grossly intact [No Focal Deficits] : no focal deficits [Normal Gait] : normal gait [Deep Tendon Reflexes (DTR)] : deep tendon reflexes were 2+ and symmetric [Speech Grossly Normal] : speech grossly normal [Memory Grossly Normal] : memory grossly normal [Normal Affect] : the affect was normal [Alert and Oriented x3] : oriented to person, place, and time [Normal Mood] : the mood was normal [Normal Insight/Judgement] : insight and judgment were intact [Normal] : affect was normal and insight and judgment were intact [Comprehensive Foot Exam Normal] : Right and left foot were examined and both feet are normal. No ulcers in either foot. Toes are normal and with full ROM.  Normal tactile sensation with monofilament testing throughout both feet [de-identified] : Status post abdominal hernia repair healed well. [de-identified] : Back pain has improved [de-identified] : diffuse joint pain without swelling

## 2022-03-03 NOTE — REVIEW OF SYSTEMS
[Fatigue] : fatigue [Abdominal Pain] : abdominal pain [Heartburn] : heartburn [Joint Pain] : joint pain [Joint Stiffness] : joint stiffness [Muscle Pain] : muscle pain [Back Pain] : back pain [Dizziness] : dizziness [Negative] : Heme/Lymph [Chest Pain] : no chest pain [Palpitations] : no palpitations [Leg Claudication] : no leg claudication [Lower Ext Edema] : no lower extremity edema [Orthopnea] : no orthopnea [Nausea] : no nausea [Constipation] : no constipation [Diarrhea] : diarrhea [Vomiting] : no vomiting [Melena] : no melena [Joint Swelling] : no joint swelling [Muscle Weakness] : no muscle weakness [Headache] : no headache [Fainting] : no fainting [Confusion] : no confusion [Memory Loss] : no memory loss [Unsteady Walking] : no ataxia [FreeTextEntry7] :  rectal bleeding resolved status post hemorrhoidal banding

## 2022-03-03 NOTE — HEALTH RISK ASSESSMENT
[Former] : Former [5-9] : 5-9 [No] : In the past 12 months have you used drugs other than those required for medical reasons? No [No falls in past year] : Patient reported no falls in the past year [0] : 2) Feeling down, depressed, or hopeless: Not at all (0) [PHQ-2 Negative - No further assessment needed] : PHQ-2 Negative - No further assessment needed [None] : None [Alone] : lives alone [Retired] : retired [Feels Safe at Home] : Feels safe at home [Fully functional (bathing, dressing, toileting, transferring, walking, feeding)] : Fully functional (bathing, dressing, toileting, transferring, walking, feeding) [Fully functional (using the telephone, shopping, preparing meals, housekeeping, doing laundry, using] : Fully functional and needs no help or supervision to perform IADLs (using the telephone, shopping, preparing meals, housekeeping, doing laundry, using transportation, managing medications and managing finances) [Reports normal functional visual acuity (ie: able to read med bottle)] : Reports normal functional visual acuity [Smoke Detector] : smoke detector [Carbon Monoxide Detector] : carbon monoxide detector [Safety elements used in home] : safety elements used in home [Seat Belt] :  uses seat belt [Sunscreen] : uses sunscreen [With Patient/Caregiver] : , with patient/caregiver [Reviewed no changes] : Reviewed, no changes [Designated Healthcare Proxy] : Designated healthcare proxy [Name: ___] : Health Care Proxy's Name: [unfilled]  [Relationship: ___] : Relationship: [unfilled] [Aggressive treatment] : aggressive treatment [I will adhere to the patient's wishes.] : I will adhere to the patient's wishes. [YearQuit] : 1990 [Audit-CScore] : 0 [PDT6Sorqi] : 0 [Change in mental status noted] : No change in mental status noted [Language] : denies difficulty with language [Behavior] : denies difficulty with behavior [Learning/Retaining New Information] : denies difficulty learning/retaining new information [Handling Complex Tasks] : denies difficulty handling complex tasks [Reasoning] : denies difficulty with reasoning [Spatial Ability and Orientation] : denies difficulty with spatial ability and orientation [Sexually Active] : not sexually active [Reports changes in hearing] : Reports no changes in hearing [Reports changes in vision] : Reports no changes in vision [Reports changes in dental health] : Reports no changes in dental health [Travel to Developing Areas] : does not  travel to developing areas [TB Exposure] : is not being exposed to tuberculosis [Caregiver Concerns] : does not have caregiver concerns [AdvancecareDate] : 03/22

## 2022-03-15 ENCOUNTER — APPOINTMENT (OUTPATIENT)
Dept: CARDIOLOGY | Facility: CLINIC | Age: 72
End: 2022-03-15
Payer: MEDICARE

## 2022-03-15 ENCOUNTER — NON-APPOINTMENT (OUTPATIENT)
Age: 72
End: 2022-03-15

## 2022-03-15 VITALS — DIASTOLIC BLOOD PRESSURE: 50 MMHG | SYSTOLIC BLOOD PRESSURE: 100 MMHG | HEART RATE: 64 BPM

## 2022-03-15 VITALS
BODY MASS INDEX: 28.27 KG/M2 | OXYGEN SATURATION: 97 % | HEART RATE: 61 BPM | RESPIRATION RATE: 16 BRPM | WEIGHT: 144 LBS | HEIGHT: 60 IN | TEMPERATURE: 97.3 F

## 2022-03-15 PROCEDURE — 93000 ELECTROCARDIOGRAM COMPLETE: CPT

## 2022-03-15 PROCEDURE — 99214 OFFICE O/P EST MOD 30 MIN: CPT

## 2022-03-15 NOTE — REASON FOR VISIT
[Symptom and Test Evaluation] : symptom and test evaluation [Coronary Artery Disease] : coronary artery disease [FreeTextEntry1] : She returns for followup. Several weeks ago she had atypical chest pain and was advised to go to the emergency room. They are myocardial infarction was ruled out she underwent some type of a nuclear stress test that apparently was abnormal, underwent cardiac catheterization which revealed no progression of disease. No intervention was needed. She states her medications were changed to a certain extent but she changed him back with the exception of the fact that she is still taking an increased dose of enalapril. Of note is the fact that her BUN was in the 30s.

## 2022-03-15 NOTE — DISCHARGE NOTE PROVIDER - NSDCDCMDCOMP_GEN_ALL_CORE
Requested Prescriptions     Pending Prescriptions Disp Refills    atorvastatin (LIPITOR) 20 MG tablet [Pharmacy Med Name: ATORVASTATIN 20MG TABLETS] 90 tablet 1     Sig: TAKE 1 TABLET BY MOUTH DAILY     Last ov 1/27/2022  Last labs 1/26/22
This document is complete and the patient is ready for discharge.

## 2022-03-15 NOTE — ASSESSMENT
[FreeTextEntry1] : Impression:\par 1. Coronary disease status post stenting currently asymptomatic recent cath without progression of disease\par 2. Hypertension well controlled actually relatively hypotensive\par 3. Hyperlipidemia at goal at recent hospitalizationCholesterol 144 HDL 64 LDL 67\par \par \par Plan:\par 1. For now reduce enalapril back to 2.5 mg per day\par 2. Continue other medications

## 2022-03-15 NOTE — END OF VISIT
[Time Spent: ___ minutes] : I have spent [unfilled] minutes of time on the encounter. [Fatigue] : fatigue [SOB on Exertion] : shortness of breath during exertion [Constipation] : constipation [Negative] : Allergic/Immunologic [Recent Change In Weight] : ~T no recent weight change [Dysphagia] : no dysphagia [Shortness Of Breath] : no shortness of breath [FreeTextEntry2] : seated in wheelchair

## 2022-03-24 ENCOUNTER — RX RENEWAL (OUTPATIENT)
Age: 72
End: 2022-03-24

## 2022-03-31 NOTE — ED CDU PROVIDER INITIAL DAY NOTE - NS ED MD PROGRESS NOTE PROVIDER NAME FT
Prescription of Adderall was sent to pharmacy, will need office visit before next refill.   AMAN AYALA

## 2022-05-03 NOTE — H&P PST ADULT - CONSTITUTIONAL
Mable Martinez CMA [facility]  ?      detailed exam

## 2022-05-04 ENCOUNTER — APPOINTMENT (OUTPATIENT)
Dept: FAMILY MEDICINE | Facility: CLINIC | Age: 72
End: 2022-05-04
Payer: MEDICARE

## 2022-05-04 VITALS
RESPIRATION RATE: 14 BRPM | TEMPERATURE: 97.9 F | HEART RATE: 55 BPM | DIASTOLIC BLOOD PRESSURE: 71 MMHG | OXYGEN SATURATION: 96 % | WEIGHT: 146 LBS | BODY MASS INDEX: 28.66 KG/M2 | HEIGHT: 60 IN | SYSTOLIC BLOOD PRESSURE: 120 MMHG

## 2022-05-04 DIAGNOSIS — R42 DIZZINESS AND GIDDINESS: ICD-10-CM

## 2022-05-04 PROCEDURE — 99214 OFFICE O/P EST MOD 30 MIN: CPT

## 2022-05-04 NOTE — ASSESSMENT
[FreeTextEntry1] : Assessment and plan:\par \par 1.  Abdominal pain presently improved physical exam was unremarkable conservative management for now, patient will notify me if symptoms persist or change in character.\par \par 2. CML she is stable and will be following up with hematology oncology.  Leukocytosis has resolved with treatment.  Patient will continue Tasigna 150 mg 4 times a day.\par \par 3.  Ischemic heart disease status post stent Plavix has been discontinued and patient will continue aspirin 81 mg p.o.\par \par 4.  Hypertension excellent blood pressure control patient will continue diltiazem 240 mg, enalapril 2.5 mg and Lasix 20 mg.\par \par 5.  Hyperuricemia continue allopurinol, check uric acid levels.\par \par 6.  Hyperlipidemia patient following low-fat low-cholesterol diet and also weight loss program continue lovastatin 20 mg p.o. daily\par \par 7.  Hypothyroidism patient tolerating and doing well with levothyroxine 125 mcg.\par \par 8.  Peripheral vascular disease patient no longer complaining of claudication she has been evaluated by vascular surgery  she is doing extremely well and is stable at this time no further work-up at this time.\par \par 9.  Chronic renal insufficiency stage III stable for patient.  Patient is totally asymptomatic.\par \par 10.  Dizziness resolved.

## 2022-05-04 NOTE — HEALTH RISK ASSESSMENT
[Former] : Former [5-9] : 5-9 [No] : In the past 12 months have you used drugs other than those required for medical reasons? No [No falls in past year] : Patient reported no falls in the past year [0] : 2) Feeling down, depressed, or hopeless: Not at all (0) [PHQ-2 Negative - No further assessment needed] : PHQ-2 Negative - No further assessment needed [Audit-CScore] : 0 [YearQuit] : 1990 [UGD8Jvrzj] : 0 [Reviewed no changes] : Reviewed, no changes [Designated Healthcare Proxy] : Designated healthcare proxy [Name: ___] : Health Care Proxy's Name: [unfilled]  [Relationship: ___] : Relationship: [unfilled] [Aggressive treatment] : aggressive treatment [AdvancecareDate] : 05/22

## 2022-05-04 NOTE — HISTORY OF PRESENT ILLNESS
[FreeTextEntry1] : See HPI. [de-identified] : Patient is 72-year-old female who presents today for follow-up and disease management patient is complaining of mild to moderate abdominal discomfort which may be medication induced she denies any nausea or vomiting but does admit to cramping.  She said that the cramping actually improved with some Pepto-Bismol.  Medical history is significant for CML presently the patient is being treated, coronary artery disease, hypertension, hyperuricemia, hyperlipidemia, hypothyroidism, peripheral vascular disease, chronic renal insufficiency stage III which is stable and she does admit to occasional dizziness.  No falls and her gait has been stable.

## 2022-05-04 NOTE — PHYSICAL EXAM
[No Acute Distress] : no acute distress [Well Nourished] : well nourished [Well Developed] : well developed [Normal Voice/Communication] : normal voice/communication [Ill-Appearing] : ill-appearing [Normal Sclera/Conjunctiva] : normal sclera/conjunctiva [PERRL] : pupils equal round and reactive to light [No JVD] : no jugular venous distention [No Lymphadenopathy] : no lymphadenopathy [Supple] : supple [Normal Rate] : normal rate  [Regular Rhythm] : with a regular rhythm [Normal S1, S2] : normal S1 and S2 [No Carotid Bruits] : no carotid bruits [No Abdominal Bruit] : a ~M bruit was not heard ~T in the abdomen [No Varicosities] : no varicosities [Pedal Pulses Present] : the pedal pulses are present [No Edema] : there was no peripheral edema [No Palpable Aorta] : no palpable aorta [No Extremity Clubbing/Cyanosis] : no extremity clubbing/cyanosis [Soft] : abdomen soft [Non Tender] : non-tender [Non-distended] : non-distended [No Masses] : no abdominal mass palpated [Normal Bowel Sounds] : normal bowel sounds [No Joint Swelling] : no joint swelling [Coordination Grossly Intact] : coordination grossly intact [No Focal Deficits] : no focal deficits [Normal Gait] : normal gait [Deep Tendon Reflexes (DTR)] : deep tendon reflexes were 2+ and symmetric [Speech Grossly Normal] : speech grossly normal [Memory Grossly Normal] : memory grossly normal [Normal Affect] : the affect was normal [Alert and Oriented x3] : oriented to person, place, and time [Normal Mood] : the mood was normal [Normal Insight/Judgement] : insight and judgment were intact [Normal] : affect was normal and insight and judgment were intact [Comprehensive Foot Exam Normal] : Right and left foot were examined and both feet are normal. No ulcers in either foot. Toes are normal and with full ROM.  Normal tactile sensation with monofilament testing throughout both feet [de-identified] : Status post abdominal hernia repair healed well.  Physical exam was unremarkable no guarding or rebound [de-identified] : Back pain has improved [de-identified] : diffuse joint pain without swelling

## 2022-05-04 NOTE — REVIEW OF SYSTEMS
[Fatigue] : fatigue [Chest Pain] : no chest pain [Palpitations] : no palpitations [Leg Claudication] : no leg claudication [Lower Ext Edema] : no lower extremity edema [Orthopnea] : no orthopnea [Abdominal Pain] : abdominal pain [Nausea] : no nausea [Constipation] : no constipation [Diarrhea] : diarrhea [Vomiting] : no vomiting [Heartburn] : heartburn [Melena] : no melena [Joint Pain] : joint pain [Joint Stiffness] : joint stiffness [Joint Swelling] : no joint swelling [Muscle Weakness] : no muscle weakness [Muscle Pain] : muscle pain [Back Pain] : back pain [Headache] : no headache [Dizziness] : dizziness [Fainting] : no fainting [Confusion] : no confusion [Memory Loss] : no memory loss [Unsteady Walking] : no ataxia [Negative] : Heme/Lymph [FreeTextEntry7] :  rectal bleeding resolved status post hemorrhoidal banding

## 2022-05-19 NOTE — ED ADULT TRIAGE NOTE - SPO2 (%)
Detail Level: Detailed
Render Note In Bullet Format When Appropriate: No
Consent: The patient's consent was obtained including but not limited to risks of crusting, scabbing, blistering, scarring, darker or lighter pigmentary change, recurrence, incomplete removal and infection.
Application Tool (Optional): Liquid Nitrogen Sprayer
Post-Care Instructions: I reviewed with the patient in detail post-care instructions. Patient is to wear sunprotection, and avoid picking at any of the treated lesions. Pt may apply Vaseline to crusted or scabbing areas.
Show Aperture Variable?: Yes
Number Of Freeze-Thaw Cycles: 1 freeze-thaw cycle
Duration Of Freeze Thaw-Cycle (Seconds): 1
Number Of Freeze-Thaw Cycles: 2 freeze-thaw cycles
Medical Necessity Information: It is in your best interest to select a reason for this procedure from the list below. All of these items fulfill various CMS LCD requirements except the new and changing color options.
Spray Paint Text: The liquid nitrogen was applied to the skin utilizing a spray paint frosting technique.
Medical Necessity Clause: This procedure was medically necessary because the lesions that were treated were:
93

## 2022-05-29 ENCOUNTER — NON-APPOINTMENT (OUTPATIENT)
Age: 72
End: 2022-05-29

## 2022-05-30 ENCOUNTER — TRANSCRIPTION ENCOUNTER (OUTPATIENT)
Age: 72
End: 2022-05-30

## 2022-06-01 DIAGNOSIS — J40 BRONCHITIS, NOT SPECIFIED AS ACUTE OR CHRONIC: ICD-10-CM

## 2022-06-14 ENCOUNTER — RX RENEWAL (OUTPATIENT)
Age: 72
End: 2022-06-14

## 2022-06-16 NOTE — PATIENT PROFILE ADULT - NSASFALLNEEDSASSISTWITH_GEN_A_NUR
[facility]  History and Physical  Ale Holder M.D. Alicia Caldwell  YOB: 1959    Date of Service:  6/16/2022    Chief Complaint:   Alicia Caldwell is a 63 y.o. female who presents for   Chief Complaint   Patient presents with    Annual Exam       Assessment/Plan:    Tory Henderson was seen today for annual exam.    Diagnoses and all orders for this visit:    Encounter for well adult exam without abnormal findings  -     Lipid Panel; Future  -     Comprehensive Metabolic Panel; Future  -     CBC WITH DIFFERENTIAL/PLATELET; Future    Mixed hyperlipidemia  Patient will think about starting on Crestor 10 mg 1/2 every other day if still high    Gastroesophageal reflux disease without esophagitis    Asthma due to environmental allergies    Stable and continue on current treatment      Return Fasting Physical in 1 year and Lab Mon at 8. HPI: Here for Annual Physical and Follow up. She complain of nausea, lightheaded and weak muscles the day after exercise. She does sweat a lot and worry about her potassium being low. She will go get lab the day after exercising next week. Hyperlipidemia:  Patient is  following a low fat, low cholesterol diet. She is  exercising regularly. OTC Supplements: none. She was groggy when she was taking Crestor 10 mg daily. Calcium score was ok.     The 10-year ASCVD risk score (Rosy Dee., et al., 2013) is: 2.3%    Values used to calculate the score:      Age: 63 years      Sex: Female      Is Non- : No      Diabetic: No      Tobacco smoker: No      Systolic Blood Pressure: 463 mmHg      Is BP treated: No      HDL Cholesterol: 58 mg/dL      Total Cholesterol: 234 mg/dL    No results found for: LABA1C, LABMICR  Lab Results   Component Value Date     06/14/2021    K 4.6 06/14/2021     06/14/2021    CO2 26 06/14/2021    BUN 16 06/14/2021    CREATININE 0.6 06/14/2021    GLUCOSE 94 06/14/2021    CALCIUM 9.1 06/14/2021     Lab Results   Component Value Date    CHOL 234 06/14/2021    TRIG 109 06/14/2021    HDL 58 06/14/2021    HDL 65 10/13/2011    LDLCALC 154 06/14/2021     Lab Results   Component Value Date    ALT 14 06/14/2021    AST 15 06/14/2021     Lab Results   Component Value Date    TSH 3.84 06/02/2020    TSH 2.33 01/12/2016     Lab Results   Component Value Date    WBC 4.1 06/14/2021    HGB 13.1 06/14/2021    HCT 38.4 06/14/2021    MCV 96.4 06/14/2021     06/14/2021     No results found for: INR   No results found for: PSA   No results found for: LABURIC     Wt Readings from Last 3 Encounters:   06/16/22 183 lb (83 kg)   05/18/22 185 lb (83.9 kg)   04/28/22 188 lb (85.3 kg)     BP Readings from Last 3 Encounters:   06/16/22 102/72   04/28/22 126/80   11/19/21 110/60       Patient Active Problem List   Diagnosis    Ganglion cyst of left foot    Bunion, left    Dermatitis    Gastroesophageal reflux disease without esophagitis    RAD (reactive airway disease), mild intermittent, uncomplicated    Asthma due to environmental allergies    Precordial pain       No Known Allergies  No outpatient medications have been marked as taking for the 6/16/22 encounter (Office Visit) with Darinel Lassiter MD.       Past Medical History:   Diagnosis Date    Atrophic vaginitis     Gastroesophageal reflux disease without esophagitis 12/4/2018    RAD (reactive airway disease), mild intermittent, uncomplicated 17/19/6716     Past Surgical History:   Procedure Laterality Date    COLONOSCOPY  4/6/16    polyps    FOOT SURGERY Left 03/01/2018     Family History   Problem Relation Age of Onset    Alzheimer's Disease Mother     Arthritis Mother     High Blood Pressure Mother     Thyroid Disease Mother     Arthritis Father     Cancer Father         lung    Arthritis Maternal Grandmother     Alzheimer's Disease Maternal Grandmother     Cancer Maternal Grandfather         prostate    High Blood Pressure Maternal Grandfather     No Known Problems Paternal Grandmother     No Known Problems Paternal Grandfather     Stroke Maternal Uncle     Heart Attack Paternal Uncle 72        CABG in his [de-identified]    Breast Cancer Maternal Cousin      Social History     Socioeconomic History    Marital status:      Spouse name: Not on file    Number of children: 3    Years of education: Not on file    Highest education level: Not on file   Occupational History    Occupation: retired   Tobacco Use    Smoking status: Never Smoker    Smokeless tobacco: Never Used   Vaping Use    Vaping Use: Never used   Substance and Sexual Activity    Alcohol use: Yes     Alcohol/week: 2.0 standard drinks     Types: 2 Glasses of wine per week    Drug use: No    Sexual activity: Yes     Partners: Male   Other Topics Concern    Not on file   Social History Narrative    Not on file     Social Determinants of Health     Financial Resource Strain: Low Risk     Difficulty of Paying Living Expenses: Not hard at all   Food Insecurity: No Food Insecurity    Worried About [address] in the Last Year: Never true    [facility] in the Last Year: Never true   Transportation Needs:     Lack of Transportation (Medical): Not on file    Lack of Transportation (Non-Medical):  Not on file   Physical Activity:     Days of Exercise per Week: Not on file    Minutes of Exercise per Session: Not on file   Stress:     Feeling of Stress : Not on file   Social Connections:     Frequency of Communication with Friends and Family: Not on file    Frequency of Social Gatherings with Friends and Family: Not on file    Attends Mosque Services: Not on file    Active Member of Clubs or Organizations: Not on file    Attends Club or Organization Meetings: Not on file    Marital Status: Not on file   Intimate Partner Violence:     Fear of Current or Ex-Partner: Not on file    Emotionally Abused: Not on file    Physically Abused: Not on file   Ailyn Gallagher Sexually Abused: Not on file   Housing Stability:     Unable to Pay for Housing in the Last Year: Not on file    Number of Places Lived in the Last Year: Not on file    Unstable Housing in the Last Year: Not on file       Review of Systems:  A comprehensive review of systems was negative except for what was noted in the HPI. Physical Exam:   Vitals:    06/16/22 0957   BP: 102/72   Pulse: 84   SpO2: 98%   Weight: 183 lb (83 kg)   Height: 5' 4\" (1.626 m)     Body mass index is 31.41 kg/m². Constitutional: She is oriented to person, place, and time. She appears well-developed and well-nourished. No distress. HEENT:   Head: Normocephalic and atraumatic. Right Ear: Tympanic membrane, external ear and ear canal normal.   Left Ear: Tympanic membrane, external ear and ear canal normal.   Mouth/Throat: Oropharynx is clear and moist, and mucous membranes are normal.  There is no cervical adenopathy. Eyes: Conjunctivae and extraocular motions are normal. Pupils are equal, round, and reactive to light. Neck: Supple. No JVD present. Carotid bruit is not present. No mass and no thyromegaly present. Cardiovascular: Normal rate, regular rhythm, normal heart sounds and intact distal pulses. Pulmonary/Chest: Effort normal and breath sounds normal. No respiratory distress. She has no wheezes, rhonchi or rales. Abdominal: Soft, non-tender. Bowel sounds and aorta are normal. She exhibits no organomegaly, mass or bruit. Musculoskeletal: Normal range of motion, no synovitis. She exhibits no edema. Neurological: She is alert and oriented to person, place, and time. She has normal reflexes. No cranial nerve deficit. Coordination normal.   Skin: Skin is warm and dry. There is no rash or erythema. No suspicious lesions noted.        Preventive Care:  Health Maintenance   Topic Date Due    Lipids  06/14/2022    Depression Screen  06/14/2022    DTaP/Tdap/Td vaccine (2 - Td or Tdap) 11/26/2022    Cervical cancer screen  12/09/2022    Breast cancer screen  05/18/2024    Colorectal Cancer Screen  04/26/2026    Pneumococcal 0-64 years Vaccine (3 - PPSV23 or PCV20) 08/30/2026    Flu vaccine  Completed    Shingles vaccine  Completed    COVID-19 Vaccine  Completed    Hepatitis C screen  Completed    HIV screen  Completed    Hepatitis A vaccine  Aged Out    Hepatitis B vaccine  Aged Out    Hib vaccine  Aged Out    Meningococcal (ACWY) vaccine  Aged Out        Recommendations for [employer] Due: see orders and patient instructions/AVS. standing/walking

## 2022-07-12 ENCOUNTER — APPOINTMENT (OUTPATIENT)
Dept: CARDIOLOGY | Facility: CLINIC | Age: 72
End: 2022-07-12

## 2022-07-12 ENCOUNTER — NON-APPOINTMENT (OUTPATIENT)
Age: 72
End: 2022-07-12

## 2022-07-12 VITALS — SYSTOLIC BLOOD PRESSURE: 110 MMHG | HEART RATE: 56 BPM | DIASTOLIC BLOOD PRESSURE: 52 MMHG

## 2022-07-12 VITALS
BODY MASS INDEX: 27.09 KG/M2 | HEART RATE: 57 BPM | TEMPERATURE: 97.1 F | WEIGHT: 138 LBS | HEIGHT: 60 IN | OXYGEN SATURATION: 98 % | RESPIRATION RATE: 16 BRPM

## 2022-07-12 DIAGNOSIS — Z00.00 ENCOUNTER FOR GENERAL ADULT MEDICAL EXAMINATION W/OUT ABNORMAL FINDINGS: ICD-10-CM

## 2022-07-12 PROCEDURE — 99214 OFFICE O/P EST MOD 30 MIN: CPT

## 2022-07-12 PROCEDURE — 93000 ELECTROCARDIOGRAM COMPLETE: CPT

## 2022-07-12 NOTE — ASSESSMENT
[FreeTextEntry1] : Impression:\par 1. Coronary disease status post stenting currently asymptomatic  cath earlier this year without progression of disease\par 2. Hypertension well controlled \par 3. Hyperlipidemia at goal at last check\par \par \par Plan:\par Continue current regimen

## 2022-07-12 NOTE — REASON FOR VISIT
[Symptom and Test Evaluation] : symptom and test evaluation [Coronary Artery Disease] : coronary artery disease [FreeTextEntry1] : Patient returns for followup for the most part she is doing very well. She has on occasion still had a sharp left-sided chest pain last for a few minutes. She recently came back from a trip to [place].

## 2022-07-13 ENCOUNTER — APPOINTMENT (OUTPATIENT)
Dept: FAMILY MEDICINE | Facility: CLINIC | Age: 72
End: 2022-07-13

## 2022-07-13 VITALS
HEIGHT: 60 IN | DIASTOLIC BLOOD PRESSURE: 64 MMHG | BODY MASS INDEX: 27.48 KG/M2 | SYSTOLIC BLOOD PRESSURE: 109 MMHG | RESPIRATION RATE: 15 BRPM | WEIGHT: 140 LBS | OXYGEN SATURATION: 94 % | TEMPERATURE: 98 F | HEART RATE: 65 BPM

## 2022-07-13 DIAGNOSIS — R10.9 UNSPECIFIED ABDOMINAL PAIN: ICD-10-CM

## 2022-07-13 PROCEDURE — 99214 OFFICE O/P EST MOD 30 MIN: CPT | Mod: 25

## 2022-07-13 PROCEDURE — 36415 COLL VENOUS BLD VENIPUNCTURE: CPT

## 2022-07-13 NOTE — ASSESSMENT
[FreeTextEntry1] : Assessment and plan:\par \par 1.  Abdominal pain resolved.\par \par 2. CML she is stable and will be following up with hematology oncology.  Leukocytosis has resolved with treatment.  Patient will continue Tasigna 150 mg 4 times a day.\par \par 3.  Ischemic heart disease status post stent Plavix has been discontinued and patient will continue aspirin 81 mg p.o.\par \par 4.  Hypertension excellent blood pressure control patient will continue diltiazem 240 mg, enalapril 2.5 mg and Lasix 20 mg.\par \par 5.  Hyperuricemia continue allopurinol, check uric acid levels.\par \par 6.  Hyperlipidemia patient following low-fat low-cholesterol diet and also weight loss program continue lovastatin 20 mg p.o. daily\par \par 7.  Hypothyroidism patient tolerating and doing well with levothyroxine 125 mcg.\par \par 8.  Peripheral vascular disease patient no longer complaining of claudication she has been evaluated by vascular surgery  she is doing extremely well and is stable at this time no further work-up at this time.\par \par 9.  Chronic renal insufficiency stage III stable for patient.  Patient is totally asymptomatic.\par \par 10.  Dizziness resolved.\par \par 11.  Comprehensive blood work drawn in office by examiner.

## 2022-07-13 NOTE — HEALTH RISK ASSESSMENT
[Former] : Former [5-9] : 5-9 [No] : In the past 12 months have you used drugs other than those required for medical reasons? No [No falls in past year] : Patient reported no falls in the past year [0] : 2) Feeling down, depressed, or hopeless: Not at all (0) [PHQ-2 Negative - No further assessment needed] : PHQ-2 Negative - No further assessment needed [With Patient/Caregiver] : , with patient/caregiver [Reviewed no changes] : Reviewed, no changes [Designated Healthcare Proxy] : Designated healthcare proxy [Name: ___] : Health Care Proxy's Name: [unfilled]  [Relationship: ___] : Relationship: [unfilled] [Aggressive treatment] : aggressive treatment [I will adhere to the patient's wishes.] : I will adhere to the patient's wishes. [YearQuit] : 1990 [Audit-CScore] : 0 [IXH2Beyfa] : 0 [AdvancecareDate] : 07/22

## 2022-07-13 NOTE — HISTORY OF PRESENT ILLNESS
[FreeTextEntry1] : See HPI. [de-identified] : Patient is 72-year-old female who presents today for follow-up and disease management patient is complaining of mild to moderate abdominal discomfort which may be medication induced she denies any nausea or vomiting but does admit to cramping and does admit that the abdominal discomfort has improved compared to previous..  She said that the cramping actually improved with some Pepto-Bismol.  Medical history is significant for CML presently the patient is being treated, coronary artery disease, hypertension, hyperuricemia, hyperlipidemia, hypothyroidism, peripheral vascular disease, chronic renal insufficiency stage III which is stable and she does admit to occasional dizziness.  No falls and her gait has been stable.

## 2022-07-13 NOTE — PHYSICAL EXAM
[No Acute Distress] : no acute distress [Well Nourished] : well nourished [Well Developed] : well developed [Normal Voice/Communication] : normal voice/communication [Ill-Appearing] : ill-appearing [Normal Sclera/Conjunctiva] : normal sclera/conjunctiva [PERRL] : pupils equal round and reactive to light [No JVD] : no jugular venous distention [No Lymphadenopathy] : no lymphadenopathy [Supple] : supple [Normal Rate] : normal rate  [Regular Rhythm] : with a regular rhythm [Normal S1, S2] : normal S1 and S2 [No Carotid Bruits] : no carotid bruits [No Abdominal Bruit] : a ~M bruit was not heard ~T in the abdomen [No Varicosities] : no varicosities [Pedal Pulses Present] : the pedal pulses are present [No Edema] : there was no peripheral edema [No Palpable Aorta] : no palpable aorta [No Extremity Clubbing/Cyanosis] : no extremity clubbing/cyanosis [Soft] : abdomen soft [Non Tender] : non-tender [Non-distended] : non-distended [No Masses] : no abdominal mass palpated [Normal Bowel Sounds] : normal bowel sounds [No Joint Swelling] : no joint swelling [Coordination Grossly Intact] : coordination grossly intact [No Focal Deficits] : no focal deficits [Normal Gait] : normal gait [Deep Tendon Reflexes (DTR)] : deep tendon reflexes were 2+ and symmetric [Speech Grossly Normal] : speech grossly normal [Memory Grossly Normal] : memory grossly normal [Normal Affect] : the affect was normal [Alert and Oriented x3] : oriented to person, place, and time [Normal Mood] : the mood was normal [Normal Insight/Judgement] : insight and judgment were intact [Normal] : affect was normal and insight and judgment were intact [Comprehensive Foot Exam Normal] : Right and left foot were examined and both feet are normal. No ulcers in either foot. Toes are normal and with full ROM.  Normal tactile sensation with monofilament testing throughout both feet [de-identified] : Status post abdominal hernia repair healed well.  Physical exam was unremarkable no guarding or rebound [de-identified] : Back pain has improved [de-identified] : diffuse joint pain without swelling

## 2022-07-14 LAB
ALBUMIN SERPL ELPH-MCNC: 4 G/DL
ALP BLD-CCNC: 76 U/L
ALT SERPL-CCNC: 16 U/L
ANION GAP SERPL CALC-SCNC: 11 MMOL/L
AST SERPL-CCNC: 20 U/L
BASOPHILS # BLD AUTO: 0.04 K/UL
BASOPHILS NFR BLD AUTO: 0.8 %
BILIRUB SERPL-MCNC: 0.5 MG/DL
BUN SERPL-MCNC: 28 MG/DL
CALCIUM SERPL-MCNC: 9.3 MG/DL
CHLORIDE SERPL-SCNC: 107 MMOL/L
CHOLEST SERPL-MCNC: 196 MG/DL
CO2 SERPL-SCNC: 24 MMOL/L
CREAT SERPL-MCNC: 1.39 MG/DL
EGFR: 41 ML/MIN/1.73M2
EOSINOPHIL # BLD AUTO: 0.18 K/UL
EOSINOPHIL NFR BLD AUTO: 3.5 %
GLUCOSE SERPL-MCNC: 112 MG/DL
HCT VFR BLD CALC: 32.6 %
HDLC SERPL-MCNC: 62 MG/DL
HGB BLD-MCNC: 9.9 G/DL
IMM GRANULOCYTES NFR BLD AUTO: 0.2 %
LDLC SERPL CALC-MCNC: 117 MG/DL
LYMPHOCYTES # BLD AUTO: 1.79 K/UL
LYMPHOCYTES NFR BLD AUTO: 35.2 %
MAN DIFF?: NORMAL
MCHC RBC-ENTMCNC: 30.4 GM/DL
MCHC RBC-ENTMCNC: 30.4 PG
MCV RBC AUTO: 100 FL
MONOCYTES # BLD AUTO: 0.55 K/UL
MONOCYTES NFR BLD AUTO: 10.8 %
NEUTROPHILS # BLD AUTO: 2.51 K/UL
NEUTROPHILS NFR BLD AUTO: 49.5 %
NONHDLC SERPL-MCNC: 134 MG/DL
PLATELET # BLD AUTO: 197 K/UL
POTASSIUM SERPL-SCNC: 4.7 MMOL/L
PROT SERPL-MCNC: 6.4 G/DL
RBC # BLD: 3.26 M/UL
RBC # FLD: 14.6 %
SODIUM SERPL-SCNC: 142 MMOL/L
T4 FREE SERPL-MCNC: 2.1 NG/DL
TRIGL SERPL-MCNC: 85 MG/DL
TSH SERPL-ACNC: 0.07 UIU/ML
URATE SERPL-MCNC: 5.6 MG/DL
WBC # FLD AUTO: 5.08 K/UL

## 2022-08-12 ENCOUNTER — NON-APPOINTMENT (OUTPATIENT)
Age: 72
End: 2022-08-12

## 2022-08-12 DIAGNOSIS — J04.0 ACUTE LARYNGITIS: ICD-10-CM

## 2022-09-08 ENCOUNTER — RX RENEWAL (OUTPATIENT)
Age: 72
End: 2022-09-08

## 2022-09-14 ENCOUNTER — RX RENEWAL (OUTPATIENT)
Age: 72
End: 2022-09-14

## 2022-09-14 ENCOUNTER — APPOINTMENT (OUTPATIENT)
Dept: FAMILY MEDICINE | Facility: CLINIC | Age: 72
End: 2022-09-14

## 2022-09-14 VITALS
DIASTOLIC BLOOD PRESSURE: 61 MMHG | BODY MASS INDEX: 27.48 KG/M2 | RESPIRATION RATE: 16 BRPM | TEMPERATURE: 98.3 F | OXYGEN SATURATION: 94 % | WEIGHT: 140 LBS | SYSTOLIC BLOOD PRESSURE: 102 MMHG | HEIGHT: 60 IN | HEART RATE: 65 BPM
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
## 2022-09-14 PROCEDURE — 99214 OFFICE O/P EST MOD 30 MIN: CPT | Mod: 25

## 2022-09-14 PROCEDURE — 90662 IIV NO PRSV INCREASED AG IM: CPT

## 2022-09-14 PROCEDURE — G0008: CPT

## 2022-09-14 RX ORDER — OMEPRAZOLE 40 MG/1
40 CAPSULE, DELAYED RELEASE ORAL
Qty: 90 | Refills: 1 | Status: COMPLETED | COMMUNITY
Start: 2022-03-03 | End: 2022-09-14

## 2022-09-14 NOTE — HISTORY OF PRESENT ILLNESS
[FreeTextEntry1] : See HPI. [de-identified] : Patient is 72-year-old female who presents today for follow-up and disease management she presents today status post accidental fall at home she fell on her left knee and left shoulder patient complains of mild to moderate knee discomfort she has ecchymosis of the knee status post total knee replacement which was done approximately 10 years ago.\par \par Medical history is significant for CML, coronary artery disease, hypertension, hyperuricemia, hyperlipidemia, hypothyroidism, peripheral vascular disease and chronic kidney disease presently stable.

## 2022-09-14 NOTE — PHYSICAL EXAM
[No Acute Distress] : no acute distress [Well Nourished] : well nourished [Well Developed] : well developed [Normal Voice/Communication] : normal voice/communication [Ill-Appearing] : ill-appearing [Normal Sclera/Conjunctiva] : normal sclera/conjunctiva [PERRL] : pupils equal round and reactive to light [No JVD] : no jugular venous distention [No Lymphadenopathy] : no lymphadenopathy [Supple] : supple [Normal Rate] : normal rate  [Regular Rhythm] : with a regular rhythm [Normal S1, S2] : normal S1 and S2 [No Carotid Bruits] : no carotid bruits [No Abdominal Bruit] : a ~M bruit was not heard ~T in the abdomen [No Varicosities] : no varicosities [Pedal Pulses Present] : the pedal pulses are present [No Edema] : there was no peripheral edema [No Palpable Aorta] : no palpable aorta [No Extremity Clubbing/Cyanosis] : no extremity clubbing/cyanosis [Soft] : abdomen soft [Non Tender] : non-tender [Non-distended] : non-distended [No Masses] : no abdominal mass palpated [Normal Bowel Sounds] : normal bowel sounds [No Joint Swelling] : no joint swelling [Coordination Grossly Intact] : coordination grossly intact [No Focal Deficits] : no focal deficits [Normal Gait] : normal gait [Deep Tendon Reflexes (DTR)] : deep tendon reflexes were 2+ and symmetric [Speech Grossly Normal] : speech grossly normal [Memory Grossly Normal] : memory grossly normal [Normal Affect] : the affect was normal [Alert and Oriented x3] : oriented to person, place, and time [Normal Mood] : the mood was normal [Normal Insight/Judgement] : insight and judgment were intact [Normal] : affect was normal and insight and judgment were intact [Comprehensive Foot Exam Normal] : Right and left foot were examined and both feet are normal. No ulcers in either foot. Toes are normal and with full ROM.  Normal tactile sensation with monofilament testing throughout both feet [de-identified] : Status post abdominal hernia repair healed well.  Physical exam was unremarkable no guarding or rebound [de-identified] : Back pain has improved [de-identified] : Contusion left knee and left shoulder status post accidental fall at home outside on concrete patient has excoriation of both knee and shoulder ecchymoses slowly resolving full range of motion.

## 2022-09-14 NOTE — REVIEW OF SYSTEMS
[Fatigue] : fatigue [Abdominal Pain] : abdominal pain [Heartburn] : heartburn [Joint Pain] : joint pain [Joint Stiffness] : joint stiffness [Muscle Pain] : muscle pain [Back Pain] : back pain [Dizziness] : dizziness [Negative] : Heme/Lymph [Chest Pain] : no chest pain [Palpitations] : no palpitations [Leg Claudication] : no leg claudication [Lower Ext Edema] : no lower extremity edema [Orthopnea] : no orthopnea [Nausea] : no nausea [Constipation] : no constipation [Diarrhea] : diarrhea [Vomiting] : no vomiting [Melena] : no melena [Joint Swelling] : no joint swelling [Muscle Weakness] : no muscle weakness [Headache] : no headache [Fainting] : no fainting [Confusion] : no confusion [Memory Loss] : no memory loss [Unsteady Walking] : no ataxia [FreeTextEntry7] :  rectal bleeding resolved status post hemorrhoidal banding [FreeTextEntry9] : Left knee ecchymotic, left shoulder ecchymoses status post fall patient has full range of motion residual pain status post fall secondary to contusion.

## 2022-09-14 NOTE — ASSESSMENT
[FreeTextEntry1] : Assessment and plan\par \par 1.  Status post fall patient fell at home but outside on concrete contusing her left knee and left shoulder it is slowly healing ecchymoses is resolving and excoriations are resolving.  Recommendations local treatment and for the pain patient can take Tylenol as needed she has full range of motion no further action required at this time unless symptoms worsen.\par \par 2. CML she is stable and will be following up with hematology oncology.  Leukocytosis has resolved with treatment.  Patient will continue Tasigna 150 mg 4 times a day.\par \par 3.  Ischemic heart disease status post stent Plavix has been discontinued and patient will continue aspirin 81 mg p.o.\par \par 4.  Hypertension excellent blood pressure control patient will continue diltiazem 240 mg, enalapril 2.5 mg and Lasix 20 mg.\par \par 5.  Hyperuricemia continue allopurinol, check uric acid levels.\par \par 6.  Hyperlipidemia patient following low-fat low-cholesterol diet and also weight loss program continue lovastatin 20 mg p.o. daily\par \par 7.  Hypothyroidism patient tolerating and doing well with levothyroxine 125 mcg.\par \par 8.  Peripheral vascular disease patient no longer complaining of claudication she has been evaluated by vascular surgery  she is doing extremely well and is stable at this time no further work-up at this time.\par \par 9.  Chronic renal insufficiency stage III stable for patient.  Patient is totally asymptomatic.\par \par 10.  High-dose influenza vaccine Fluzone administered at this visit.

## 2022-09-14 NOTE — HEALTH RISK ASSESSMENT
[Former] : Former [5-9] : 5-9 [No] : In the past 12 months have you used drugs other than those required for medical reasons? No [Any fall with injury in past year] : Patient reported fall with injury in the past year [YearQuit] : 1990 [Audit-CScore] : 0

## 2022-09-21 ENCOUNTER — RX RENEWAL (OUTPATIENT)
Age: 72
End: 2022-09-21

## 2022-09-27 ENCOUNTER — RX RENEWAL (OUTPATIENT)
Age: 72
End: 2022-09-27

## 2022-11-14 ENCOUNTER — APPOINTMENT (OUTPATIENT)
Dept: CARDIOLOGY | Facility: CLINIC | Age: 72
End: 2022-11-14

## 2022-11-14 ENCOUNTER — NON-APPOINTMENT (OUTPATIENT)
Age: 72
End: 2022-11-14

## 2022-11-14 VITALS
TEMPERATURE: 98.3 F | BODY MASS INDEX: 26.7 KG/M2 | OXYGEN SATURATION: 97 % | HEART RATE: 48 BPM | HEIGHT: 60 IN | RESPIRATION RATE: 16 BRPM | WEIGHT: 136 LBS

## 2022-11-14 VITALS — HEART RATE: 50 BPM | SYSTOLIC BLOOD PRESSURE: 104 MMHG | DIASTOLIC BLOOD PRESSURE: 60 MMHG

## 2022-11-14 PROCEDURE — 93000 ELECTROCARDIOGRAM COMPLETE: CPT

## 2022-11-14 PROCEDURE — 99214 OFFICE O/P EST MOD 30 MIN: CPT

## 2022-11-14 RX ORDER — POTASSIUM CHLORIDE 1500 MG/1
20 TABLET, EXTENDED RELEASE ORAL
Qty: 90 | Refills: 1 | Status: DISCONTINUED | COMMUNITY
Start: 2017-06-08 | End: 2022-11-14

## 2022-11-14 RX ORDER — MECLIZINE HYDROCHLORIDE 12.5 MG/1
12.5 TABLET ORAL
Qty: 60 | Refills: 1 | Status: DISCONTINUED | COMMUNITY
Start: 2021-10-26 | End: 2022-11-14

## 2022-11-14 NOTE — REASON FOR VISIT
[Hyperlipidemia] : hyperlipidemia [Coronary Artery Disease] : coronary artery disease [FreeTextEntry1] : Patient returns for followup. Feeling well. Offers no complaints of chest discomfort shortness of breath palpitations dizziness or syncope.  She continues to undergo chemotherapy.  Most recent lipid profile reveals cholesterol 196 HDL 62 \par

## 2022-11-14 NOTE — ASSESSMENT
[FreeTextEntry1] : Impression:\par 1. Coronary disease status post stenting currently asymptomatic\par 2. Hypertension well controlled\par 3. Hyperlipidemia not at goal\par \par \par Plan:\par 1.  Increase lovastatin to 40 mg/day

## 2022-11-16 ENCOUNTER — APPOINTMENT (OUTPATIENT)
Dept: FAMILY MEDICINE | Facility: CLINIC | Age: 72
End: 2022-11-16

## 2022-11-16 VITALS
DIASTOLIC BLOOD PRESSURE: 56 MMHG | WEIGHT: 131 LBS | OXYGEN SATURATION: 97 % | SYSTOLIC BLOOD PRESSURE: 106 MMHG | HEART RATE: 62 BPM | BODY MASS INDEX: 25.72 KG/M2 | TEMPERATURE: 97.6 F | HEIGHT: 60 IN | RESPIRATION RATE: 16 BRPM

## 2022-11-16 DIAGNOSIS — L29.9 PRURITUS, UNSPECIFIED: ICD-10-CM

## 2022-11-16 PROCEDURE — 99214 OFFICE O/P EST MOD 30 MIN: CPT

## 2022-11-16 NOTE — REVIEW OF SYSTEMS
[Fatigue] : fatigue [Abdominal Pain] : abdominal pain [Heartburn] : heartburn [Joint Pain] : joint pain [Joint Stiffness] : joint stiffness [Muscle Pain] : muscle pain [Back Pain] : back pain [Itching] : Itching [Dizziness] : dizziness [Negative] : Heme/Lymph [Chest Pain] : no chest pain [Palpitations] : no palpitations [Leg Claudication] : no leg claudication [Lower Ext Edema] : no lower extremity edema [Orthopnea] : no orthopnea [Nausea] : no nausea [Constipation] : no constipation [Diarrhea] : diarrhea [Vomiting] : no vomiting [Melena] : no melena [Joint Swelling] : no joint swelling [Muscle Weakness] : no muscle weakness [Mole Changes] : no mole changes [Nail Changes] : no nail changes [Hair Changes] : no hair changes [Skin Rash] : no skin rash [Headache] : no headache [Fainting] : no fainting [Confusion] : no confusion [Memory Loss] : no memory loss [Unsteady Walking] : no ataxia [FreeTextEntry7] :  rectal bleeding resolved status post hemorrhoidal banding [FreeTextEntry9] : Left knee ecchymotic, left shoulder ecchymoses status post fall patient has full range of motion residual pain status post fall secondary to contusion. [de-identified] : Patient complaining of pruritic scalp no lesions visible and there is no rash.

## 2022-11-16 NOTE — HISTORY OF PRESENT ILLNESS
[FreeTextEntry1] : See HPI. [de-identified] : Patient is 72-year-old female who presents today for follow-up and disease management she presents today status post accidental fall at home she fell on her left knee and left shoulder patient complains of mild to moderate knee discomfort she has ecchymosis of the knee status post total knee replacement which was done approximately 10 years ago.\par \par Medical history is significant for CML, coronary artery disease, hypertension, hyperuricemia, hyperlipidemia, hypothyroidism, peripheral vascular disease and chronic kidney disease presently stable.

## 2022-11-16 NOTE — ASSESSMENT
[FreeTextEntry1] : Assessment and plan\par \par 1.  Pruritic skin especially of scalp there is no rash there are no signs of parasites it may be medication induced but for completeness sake I will treat the symptoms with Vistaril 10 mg to take only as needed.\par \par 2. CML she is stable and will be following up with hematology oncology.  Leukocytosis has resolved with treatment.  Patient will continue Tasigna 150 mg 4 times a day.\par \par 3.  Ischemic heart disease status post stent Plavix has been discontinued and patient will continue aspirin 81 mg p.o.\par \par 4.  Hypertension excellent blood pressure control patient will continue diltiazem 240 mg, enalapril 2.5 mg and Lasix 20 mg.\par \par 5.  Hyperuricemia continue allopurinol, check uric acid levels.\par \par 6.  Hyperlipidemia patient following low-fat low-cholesterol diet and also weight loss program continue lovastatin 20 mg p.o. daily\par \par 7.  Hypothyroidism patient tolerating and doing well with levothyroxine 125 mcg.\par \par 8.  Peripheral vascular disease patient no longer complaining of claudication she has been evaluated by vascular surgery  she is doing extremely well and is stable at this time no further work-up at this time.\par \par 9.  Chronic renal insufficiency stage III stable for patient.  Patient is totally asymptomatic.\par \par 10.  High-dose influenza vaccine Fluzone administered at this visit.

## 2022-11-16 NOTE — HEALTH RISK ASSESSMENT
[Former] : Former [5-9] : 5-9 [No] : In the past 12 months have you used drugs other than those required for medical reasons? No [No falls in past year] : Patient reported no falls in the past year [0] : 2) Feeling down, depressed, or hopeless: Not at all (0) [PHQ-2 Negative - No further assessment needed] : PHQ-2 Negative - No further assessment needed [YearQuit] : 1990 [Audit-CScore] : 0 [[id number]] : 0

## 2022-11-16 NOTE — PHYSICAL EXAM
[No Acute Distress] : no acute distress [Well Nourished] : well nourished [Well Developed] : well developed [Normal Voice/Communication] : normal voice/communication [Ill-Appearing] : ill-appearing [Normal Sclera/Conjunctiva] : normal sclera/conjunctiva [PERRL] : pupils equal round and reactive to light [No JVD] : no jugular venous distention [No Lymphadenopathy] : no lymphadenopathy [Supple] : supple [Normal Rate] : normal rate  [Regular Rhythm] : with a regular rhythm [Normal S1, S2] : normal S1 and S2 [No Carotid Bruits] : no carotid bruits [No Abdominal Bruit] : a ~M bruit was not heard ~T in the abdomen [No Varicosities] : no varicosities [Pedal Pulses Present] : the pedal pulses are present [No Edema] : there was no peripheral edema [No Palpable Aorta] : no palpable aorta [No Extremity Clubbing/Cyanosis] : no extremity clubbing/cyanosis [Soft] : abdomen soft [Non Tender] : non-tender [Non-distended] : non-distended [No Masses] : no abdominal mass palpated [Normal Bowel Sounds] : normal bowel sounds [No Joint Swelling] : no joint swelling [Coordination Grossly Intact] : coordination grossly intact [No Focal Deficits] : no focal deficits [Normal Gait] : normal gait [Deep Tendon Reflexes (DTR)] : deep tendon reflexes were 2+ and symmetric [Speech Grossly Normal] : speech grossly normal [Memory Grossly Normal] : memory grossly normal [Normal Affect] : the affect was normal [Alert and Oriented x3] : oriented to person, place, and time [Normal Mood] : the mood was normal [Normal Insight/Judgement] : insight and judgment were intact [Normal] : affect was normal and insight and judgment were intact [Comprehensive Foot Exam Normal] : Right and left foot were examined and both feet are normal. No ulcers in either foot. Toes are normal and with full ROM.  Normal tactile sensation with monofilament testing throughout both feet [de-identified] : Status post abdominal hernia repair healed well.  Physical exam was unremarkable no guarding or rebound [de-identified] : Back pain has improved [de-identified] : Contusion left knee and left shoulder status post accidental fall at home outside on concrete patient has excoriation of both knee and shoulder ecchymoses slowly resolving full range of motion.

## 2022-11-28 NOTE — ASU PATIENT PROFILE, ADULT - ACCEPTABLE
Hospital Medicine Discharge Summary    Patient ID: Mary Prather      Patient's PCP: Lucho Waddell MD    Admit Date: 11/22/2022     Discharge Date:   11/28/2022    Admitting Physician: Justin Justice DO     Discharge Physician: Faye Schofield MD     Discharge Diagnoses: Active Hospital Problems    Diagnosis Date Noted    Decompens[facility] [K72.90, K74.60] 11/22/2022     Priority: Medium     Decompensated Cirrhosis  Possible Hepatocellular Carcinoma  Esophageal Varices  Portal hypertensive gastropathy  Ascites  Hepatic encephalopathy  Coagulopathy  Cholelithiasis with Gallbladder Sludge  ? Acute vs Chronic Cholecystitis  ? Splenic Infarcts  Normocytic anemia  History of spur cell anemia  Transitioned to Home with Hospice    The patient was seen and examined on day of discharge and this discharge summary is in conjunction with any daily progress note from day of discharge. 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 Patient reports being on lactulose and compliant. Patient reported that she was discharged during hospitalization last Friday for anemia and had 3 blood transfusion. Decompensated Cirrhosis  Possible Hepatocellular Carcinoma  Esophageal Varices  - EGD on 11/10/22 showed esophageal varices with cherry red spot which was banded. Portal hypertensive gastropathy  Ascites  - s/p paracentesis 11/23 with removal of 1.8L. Fluid analysis showed nucl cell 41, PMN's 5. Findings not suggestive of SBP. Hepatic encephalopathy  - adm NH3 level 121.  - Continue lactulose 20 g TID and rifaximin BID.   Coagulopathy Cholelithiasis with Gallbladder Sludge  ? Acute vs Chronic Cholecystitis  ? Splenic Infarcts  - Abdominal Pain worsened with eating.  - US RUQ 11/23 showed cholelithiasis and associated gallbladder sludge with several large rounded non-shadowing foci in keeping with tumefactive sludge. There is diffuse mild wall thickening suggestive of chronic or acute cholecystitis. Moderate dilation of common duct with no obvious filling defect.  - CT A/P with IV contrast 11/22/22 showed cirrhosis with portal hypertension. Moderate volume ascites. The spleen is enlarged, and there are wedge-shaped areas of hypoenhancement, which could reflect variation in contrast opacification versus splenic infarcts. Gallbladder mural thickening is seen with cholelithiasis. Mild fluid and gaseous distention of small bowel without a clear transition zone. - CTA A/P with contrast (triphasic) 11/26 showed liver cirrhosis with patent hepatic artery and portal vein vasculature. The hepatic veins are not well visualized in this exam.  Sequelae from portal hypertension with gastroesophageal varices and a recanalized umbilical vein. A 4 mm hyperenhancing focus in segment 3, with no definite washout, suspicious for hepatocellular carcinoma in the setting of liver cirrhosis. - Appreciate GI participation.  - General surgery reviewed CT A/P and examined patient and felt that physical exam findings do not suggest a diagnosis of acute cholecystitis. - Received IV ceftriaxone (11/23). - Received IV zosyn (11/24-11/28). Normocytic anemia  History of spur cell anemia  - adm HgB 7.4, MCV 98.3. Recent baseline HgB 7's-8's. - Denied history of hematemesis, rectal bleeding, melena. - HgB downtrended to 6.5 on 11/23.  - Fecal occult 11/23 negative. - Iron 190, ferritin 2293 on 10/13/22.  - B12 >2000, folate >20 on 6/15/22.  - s/p 1 unit pRBC on 11/22/22.  - CTA A/P 11/26/22  showed splenomegaly.        Transition to Home with Hospice  - patient and daughter discussed with palliative care and decided to transition patient to home with hospice. Physical Exam Performed:     /62   Pulse 84   Temp 98.8 °F (37.1 °C) (Oral)   Resp 16   Ht 5' 4\" (1.626 m)   Wt 112 lb 3.4 oz (50.9 kg)   SpO2 94%   BMI 19.26 kg/m²     GEN alert, in no distress  HEENT normocephalic, icteric sclera, EOMI, mucosa moist, no stridor  NECK supple, trachea midline  RESP on RA, in no distress, decreased BS at bases. CARDS RRR, S1, S2, no murmurs, no edema, radial pulse 2+, DP pulse 2+  ABD distended, +BS, soft, LUQ> epigastric and RUQ  MSK no cyanosis, no clubbing  SKIN jaundiced, warm, dry  NEURO alert, oriented x 3, no facial asymmetry, no dysarthria, no asterixis, moving spontaneously  PSYCH normal mood              Labs: For convenience and continuity at follow-up the following most recent labs are provided:      CBC:    Lab Results   Component Value Date/Time    WBC 3.6 11/28/2022 05:49 AM    HGB 7.5 11/28/2022 05:49 AM    HCT 21.3 11/28/2022 05:49 AM    PLT 73 11/28/2022 05:49 AM       Renal:    Lab Results   Component Value Date/Time     11/28/2022 05:49 AM    K 4.2 11/28/2022 05:49 AM    K 4.4 11/25/2022 06:07 AM     11/28/2022 05:49 AM    CO2 25 11/28/2022 05:49 AM    BUN 14 11/28/2022 05:49 AM    CREATININE <0.5 11/28/2022 05:49 AM    CALCIUM 9.1 11/28/2022 05:49 AM    PHOS 3.0 11/14/2022 05:08 AM         Significant Diagnostic Studies    Radiology:   CTA ABDOMEN PELVIS W CONTRAST   Final Result   Liver cirrhosis with patent hepatic artery and portal vein vasculature. The   hepatic veins are not well visualized in this examination. Sequelae from portal hypertension with gastroesophageal varices and a   recanalized umbilical vein. 4 mm hyperenhancing focus in segment 3, with no definite washout, suspicious   for hepatocellular carcinoma in the setting of liver cirrhosis. Small volume ascites. Cholelithiasis.          [address] GALLBLADDER RUQ   Final Result   Cholelithiasis and associated gallbladder sludge with several large rounded   nonshadowing foci in keeping with tumefactive sludge. There is diffuse mild   wall thickening suggestive of chronic or acute cholecystitis. Moderate dilatation of the common duct with no obvious filling defect seen. Normal size liver and unremarkable pancreas. Mild-to-moderate ascites. IR US GUIDED PARACENTESIS   Final Result   Successful paracentesis. XR CHEST PORTABLE   Final Result   Borderline cardiomegaly which is unchanged. Hypoinflation with mild bibasilar atelectasis or scarring. Small nodular density projecting the right lung base which more apparent. This could represent a nipple shadow but is indeterminate. Recommend a PA   and lateral chest         CT ABDOMEN PELVIS W IV CONTRAST Additional Contrast? None   Final Result   Cirrhosis with portal hypertension. Moderate volume ascites. The spleen is enlarged, and there are wedge-shaped areas of hypoenhancement. These could reflect variation in contrast opacification, but splenic infarcts   should be considered as well. Gallbladder mural thickening is seen, with cholelithiasis. That is a   nonspecific finding in this case, a as the mural thickening may be secondary   to the anasarca, but acute cholecystitis remains in the differential.      Mild fluid and gaseous distention of small bowel, without a clear zone of   transition. Again this is nonspecific, but may reflect ileus in the setting   of gastroenteritis. CT HEAD WO CONTRAST   Final Result   No acute intracranial abnormality.                 Consults:     IP CONSULT TO GI  IP CONSULT TO PALLIATIVE CARE  IP CONSULT TO INTERVENTIONAL RADIOLOGY  IP CONSULT TO SOCIAL WORK  IP CONSULT TO GENERAL SURGERY  IP CONSULT TO HOSPICE    Disposition:  Home with Hospice    Condition at Discharge:  relatively stable for chronic conditions    Discharge Instructions/Follow-up:  with hospice team    Code Status:  Full Code     Activity: activity as tolerated    Diet:       Discharge Medications:     Current Discharge Medication List             Details   LORazepam (ATIVAN) 0.5 MG tablet Take 1 tablet by mouth every 4 hours as needed (anxiety or agitation) for up to 10 days. Qty: 30 tablet, Refills: 0    Associated Diagnoses: Anxiety      morphine sulfate 20 MG/ML concentrated oral solution Take 0.25 mLs by mouth every 4 hours as needed for Pain for up to 20 days. Qty: 30 mL, Refills: 0    Comments: Reduce doses taken as pain becomes manageable  Associated Diagnoses: Pain      bisacodyl (DULCOLAX) 10 MG suppository Place 1 suppository rectally daily as needed for Constipation  Qty: 30 suppository, Refills: 0      traMADol (ULTRAM) 50 MG tablet Take 1 tablet by mouth every 8 hours as needed for Pain for up to 7 days. Qty: 20 tablet, Refills: 0    Comments: Reduce doses taken as pain becomes manageable  Associated Diagnoses: Generalized abdominal pain                Details   vitamin B-1 (THIAMINE) 100 MG tablet Take 1 tablet by mouth daily  Qty: 30 tablet, Refills: 0      spironolactone (ALDACTONE) 50 MG tablet Take 1 tablet by mouth every other day Take when you feel distended (abdomen).   Qty: 30 tablet, Refills: 3      folic acid (FOLVITE) 1 MG tablet Take 1 tablet by mouth daily  Qty: 30 tablet, Refills: 0      rifAXIMin (XIFAXAN) 550 MG tablet Take 1 tablet by mouth 2 times daily  Qty: 60 tablet, Refills: 3      midodrine (PROAMATINE) 5 MG tablet Take 1 tablet by mouth in the morning, at noon, and at bedtime  Qty: 90 tablet, Refills: 3      pantoprazole (PROTONIX) 40 MG tablet Take 1 tablet by mouth every morning (before breakfast)  Qty: 30 tablet, Refills: 1      magnesium oxide (MAG-OX) 400 (240 Mg) MG tablet Take 1 tablet by mouth 2 times daily  Qty: 30 tablet, Refills: 2      lactulose (CHRONULAC) 10 GM/15ML solution Take 30 mLs by mouth 3 times daily  Qty: 2700 mL, Refills: 3      furosemide (LASIX) 20 MG tablet Take 1 tablet by mouth every other day Take when you feel distended (abdomen). Antonio Adrielivonlloydon: 30 tablet, Refills: 3      ondansetron (ZOFRAN) 4 MG tablet Take 1 tablet by mouth 3 times daily as needed for Nausea or Vomiting  Qty: 30 tablet, Refills: 0                Details   zolpidem (AMBIEN) 5 MG tablet Take 5 mg by mouth nightly as needed for Sleep (every night at bedtmes as needed for insomnia). Time Spent on discharge is less than 30 minutes in the examination, evaluation, counseling and review of medications and discharge plan. Signed:    Gladis Garcia MD   11/28/2022      Thank you Jeff Finney MD for the opportunity to be involved in this patient's care. If you have any questions or concerns please feel free to contact me at [phone]. 7

## 2022-12-02 DIAGNOSIS — R63.4 ABNORMAL WEIGHT LOSS: ICD-10-CM

## 2022-12-06 ENCOUNTER — RX RENEWAL (OUTPATIENT)
Age: 72
End: 2022-12-06

## 2022-12-28 ENCOUNTER — NON-APPOINTMENT (OUTPATIENT)
Age: 72
End: 2022-12-28

## 2023-01-17 RX ORDER — AZITHROMYCIN 250 MG/1
250 TABLET, FILM COATED ORAL
Qty: 1 | Refills: 0 | Status: COMPLETED | COMMUNITY
Start: 2019-12-03 | End: 2023-01-17

## 2023-01-17 RX ORDER — AZITHROMYCIN 250 MG/1
250 TABLET, FILM COATED ORAL
Qty: 1 | Refills: 0 | Status: COMPLETED | COMMUNITY
Start: 2022-08-12 | End: 2023-01-17

## 2023-01-17 RX ORDER — METHYLPREDNISOLONE 4 MG/1
4 TABLET ORAL
Qty: 1 | Refills: 0 | Status: COMPLETED | COMMUNITY
Start: 2022-08-12 | End: 2023-01-17

## 2023-01-17 RX ORDER — PREDNISONE 10 MG/1
10 TABLET ORAL
Qty: 21 | Refills: 1 | Status: COMPLETED | COMMUNITY
Start: 2022-06-01 | End: 2023-01-17

## 2023-01-17 NOTE — H&P ADULT - PROBLEM SELECTOR PLAN 6
Dr. Santa Garay,    Please advise. Patient stated that she took her \"insulin\" Trulicity when blood sugars were in 200s,   and . Patient called Dr. Bjorn Gongora and he recommended for patient to take additional dose on Friday. RN educated that this medication is not insulin and is a GLP-1 and to call us when blood sugars are high. Yesterday B fasting--nervousness jittery, mild headache. Today: 147 fasting-- feeling better     Requesting new test kit and test strips--checking 3x a day. Dr. Bjorn Gongora prescribed antibiotics 23 due to swelling, mass, or lump on spine. Patient is scheduled for 23 and requesting if that's okay or to see her earlier. Transitions of Care Status:  1.  Name of PCP: Junior  2.  PCP Contacted on Admission: [ ] Y    [ ] N    3.  PCP contacted at Discharge: [ ] Y    [ ] N    [ ] N/A  4.  Post-Discharge Appointment Date and Location:  5.  Summary of Handoff given to PCP:

## 2023-01-25 ENCOUNTER — APPOINTMENT (OUTPATIENT)
Dept: FAMILY MEDICINE | Facility: CLINIC | Age: 73
End: 2023-01-25
Payer: MEDICARE

## 2023-01-25 VITALS
TEMPERATURE: 97.4 F | HEART RATE: 77 BPM | SYSTOLIC BLOOD PRESSURE: 105 MMHG | OXYGEN SATURATION: 96 % | WEIGHT: 120 LBS | DIASTOLIC BLOOD PRESSURE: 64 MMHG | HEIGHT: 60 IN | BODY MASS INDEX: 23.56 KG/M2 | RESPIRATION RATE: 16 BRPM

## 2023-01-25 DIAGNOSIS — I73.9 PERIPHERAL VASCULAR DISEASE, UNSPECIFIED: ICD-10-CM

## 2023-01-25 PROCEDURE — 99495 TRANSJ CARE MGMT MOD F2F 14D: CPT

## 2023-01-25 RX ORDER — LACTOSE-REDUCED FOOD
LIQUID (ML) ORAL TWICE DAILY
Qty: 2 | Refills: 3 | Status: ACTIVE | COMMUNITY
Start: 2022-12-02 | End: 1900-01-01

## 2023-01-25 RX ORDER — DILTIAZEM HYDROCHLORIDE 240 MG/1
240 CAPSULE, EXTENDED RELEASE ORAL
Qty: 90 | Refills: 3 | Status: COMPLETED | COMMUNITY
Start: 2019-01-28 | End: 2023-01-25

## 2023-01-25 RX ORDER — FAMOTIDINE 20 MG/1
20 TABLET, FILM COATED ORAL
Qty: 180 | Refills: 3 | Status: COMPLETED | COMMUNITY
Start: 2019-11-20 | End: 2023-01-25

## 2023-01-25 NOTE — ASSESSMENT
[FreeTextEntry1] : Assessment and plan:\par \par 1.  Status post hospitalization for abdominal pain severe reflux continue proton pump inhibitor pantoprazole and incidental finding questionable areas that may cause obstruction of the intestine may be secondary to adhesions patient will follow-up with surgeon.\par \par 2.  CML patient doing well she is followed up closely by hematology and she will continue Tasigna 150 mg 2 capsule twice a day on an empty stomach.\par \par 3.  Coronary artery disease well-controlled patient is chest pain-free follow-up with cardiology.\par \par 4.  Hypertension excellent blood pressure control patient is only taking enalapril 2.5 mg daily diltiazem and furosemide were discontinued while hospitalized.\par \par 5.  Hyperuricemia continue allopurinol 300 mg p.o. daily.\par \par 6.  Hypothyroidism continue levothyroxine 112 mcg daily at next visit I will be obtaining comprehensive blood work reviewed blood work from hospital.\par \par 7.  CKD stage III stable recommendations good hydration at least 64 ounces of fluids daily.\par \par 8.  Peripheral neuropathy continue gabapentin 400 mg twice a day.\par \par 9.  Excessive weight loss continue Ensure.

## 2023-01-25 NOTE — REVIEW OF SYSTEMS
[Abdominal Pain] : abdominal pain [Heartburn] : heartburn [Joint Pain] : joint pain [Joint Stiffness] : joint stiffness [Muscle Pain] : muscle pain [Back Pain] : back pain [Itching] : Itching [Dizziness] : dizziness [Negative] : Heme/Lymph [Chest Pain] : no chest pain [Palpitations] : no palpitations [Leg Claudication] : no leg claudication [Orthopnea] : no orthopnea [Lower Ext Edema] : no lower extremity edema [Nausea] : no nausea [Constipation] : no constipation [Diarrhea] : diarrhea [Vomiting] : no vomiting [Melena] : no melena [Joint Swelling] : no joint swelling [Muscle Weakness] : no muscle weakness [Mole Changes] : no mole changes [Nail Changes] : no nail changes [Hair Changes] : no hair changes [Skin Rash] : no skin rash [Headache] : no headache [Fainting] : no fainting [Confusion] : no confusion [Memory Loss] : no memory loss [Unsteady Walking] : no ataxia [FreeTextEntry7] :  rectal bleeding resolved status post hemorrhoidal banding [FreeTextEntry9] : Left knee ecchymotic, left shoulder ecchymoses status post fall patient has full range of motion residual pain status post fall secondary to contusion. [de-identified] : Patient complaining of pruritic scalp no lesions visible and there is no rash.

## 2023-01-25 NOTE — HEALTH RISK ASSESSMENT
[Intercurrent hospitalizations] : was admitted to the hospital  [Former] : Former [20 or more] : 20 or more [> 15 Years] : > 15 Years [No] : In the past 12 months have you used drugs other than those required for medical reasons? No [Any fall with injury in past year] : Patient reported fall with injury in the past year [0] : 2) Feeling down, depressed, or hopeless: Not at all (0) [PHQ-2 Negative - No further assessment needed] : PHQ-2 Negative - No further assessment needed [None] : None [Alone] : lives alone [# of Members in Household ___] :  household currently consist of [unfilled] member(s) [Retired] : retired [High School] : high school [Feels Safe at Home] : Feels safe at home [Fully functional (bathing, dressing, toileting, transferring, walking, feeding)] : Fully functional (bathing, dressing, toileting, transferring, walking, feeding) [Fully functional (using the telephone, shopping, preparing meals, housekeeping, doing laundry, using] : Fully functional and needs no help or supervision to perform IADLs (using the telephone, shopping, preparing meals, housekeeping, doing laundry, using transportation, managing medications and managing finances) [Smoke Detector] : smoke detector [Carbon Monoxide Detector] : carbon monoxide detector [Safety elements used in home] : safety elements used in home [Seat Belt] :  uses seat belt [Sunscreen] : uses sunscreen [With Patient/Caregiver] : , with patient/caregiver [Reviewed no changes] : Reviewed, no changes [Designated Healthcare Proxy] : Designated healthcare proxy [Name: ___] : Health Care Proxy's Name: [unfilled]  [Relationship: ___] : Relationship: [unfilled] [Aggressive treatment] : aggressive treatment [I will adhere to the patient's wishes.] : I will adhere to the patient's wishes. [Audit-CScore] : 0 [EOA3Ontcc] : 0 [Change in mental status noted] : No change in mental status noted [Language] : denies difficulty with language [Behavior] : denies difficulty with behavior [Learning/Retaining New Information] : denies difficulty learning/retaining new information [Handling Complex Tasks] : denies difficulty handling complex tasks [Reasoning] : denies difficulty with reasoning [Spatial Ability and Orientation] : denies difficulty with spatial ability and orientation [Sexually Active] : not sexually active [Reports changes in hearing] : Reports no changes in hearing [Reports changes in vision] : Reports no changes in vision [Reports normal functional visual acuity (ie: able to read med bottle)] : Reports poor functional visual acuity.  [Reports changes in dental health] : Reports no changes in dental health [Guns at Home] : no guns at home [Travel to Developing Areas] : does not  travel to developing areas [TB Exposure] : is not being exposed to tuberculosis [Caregiver Concerns] : does not have caregiver concerns [de-identified] : Reading glasses [AdvancecareDate] : 01/23

## 2023-01-25 NOTE — PHYSICAL EXAM
[No Acute Distress] : no acute distress [Well Nourished] : well nourished [Well Developed] : well developed [Normal Voice/Communication] : normal voice/communication [Ill-Appearing] : ill-appearing [Normal Sclera/Conjunctiva] : normal sclera/conjunctiva [PERRL] : pupils equal round and reactive to light [No JVD] : no jugular venous distention [No Lymphadenopathy] : no lymphadenopathy [Supple] : supple [Normal Rate] : normal rate  [Regular Rhythm] : with a regular rhythm [Normal S1, S2] : normal S1 and S2 [No Carotid Bruits] : no carotid bruits [No Abdominal Bruit] : a ~M bruit was not heard ~T in the abdomen [No Varicosities] : no varicosities [Pedal Pulses Present] : the pedal pulses are present [No Edema] : there was no peripheral edema [No Palpable Aorta] : no palpable aorta [No Extremity Clubbing/Cyanosis] : no extremity clubbing/cyanosis [Soft] : abdomen soft [Non Tender] : non-tender [Non-distended] : non-distended [No Masses] : no abdominal mass palpated [Normal Bowel Sounds] : normal bowel sounds [No Joint Swelling] : no joint swelling [Coordination Grossly Intact] : coordination grossly intact [No Focal Deficits] : no focal deficits [Normal Gait] : normal gait [Deep Tendon Reflexes (DTR)] : deep tendon reflexes were 2+ and symmetric [Speech Grossly Normal] : speech grossly normal [Memory Grossly Normal] : memory grossly normal [Normal Affect] : the affect was normal [Alert and Oriented x3] : oriented to person, place, and time [Normal Mood] : the mood was normal [Normal Insight/Judgement] : insight and judgment were intact [Normal] : affect was normal and insight and judgment were intact [Comprehensive Foot Exam Normal] : Right and left foot were examined and both feet are normal. No ulcers in either foot. Toes are normal and with full ROM.  Normal tactile sensation with monofilament testing throughout both feet [de-identified] : Status post abdominal hernia repair healed well.  Physical exam was unremarkable no guarding or rebound [de-identified] : Back pain has improved

## 2023-01-25 NOTE — HISTORY OF PRESENT ILLNESS
[Post-hospitalization from ___ Hospital] : Post-hospitalization from [unfilled] Hospital [Admitted on: ___] : The patient was admitted on [unfilled] [Discharged on ___] : discharged on [unfilled] [Discharge Summary] : discharge summary [Pertinent Labs] : pertinent labs [Radiology Findings] : radiology findings [Discharge Med List] : discharge medication list [Other: ____] : [unfilled] [Med Reconciliation] : medication reconciliation has been completed [Patient Contacted By: ____] : and contacted by [unfilled] [FreeTextEntry2] : Patient is a 72-year-old female who presents today status post hospitalization patient was hospitalized at [facility] on January 9 and discharged on January 14.  Diagnosis severe abdominal pain secondary to gastroesophageal reflux disease.  Patient was treated with proton pump inhibitor subsequently feeling much better with Protonix they also found an incidental finding questionable bowel obstruction which has resolved patient has follow-up appointment with surgeon.  Medical history is significant for CML, CAD, hypertension, hyperuricemia, hyperlipidemia, hypothyroidism, peripheral vascular disease and CKD stage III.

## 2023-02-20 ENCOUNTER — RX RENEWAL (OUTPATIENT)
Age: 73
End: 2023-02-20

## 2023-02-26 NOTE — MEDICAL STUDENT ADULT H&P (EDUCATION) - NS MD HP STUD MEDICATIONS FT
Aspirin  clopidogrel  gabapentin  furosemide  levothyroxine  lovastatin SO: [id number] score: 1 (female gender)

## 2023-03-06 ENCOUNTER — APPOINTMENT (OUTPATIENT)
Dept: ORTHOPEDIC SURGERY | Facility: CLINIC | Age: 73
End: 2023-03-06
Payer: MEDICARE

## 2023-03-06 VITALS — WEIGHT: 120 LBS | HEIGHT: 59 IN | BODY MASS INDEX: 24.19 KG/M2

## 2023-03-06 DIAGNOSIS — Z96.651 PRESENCE OF RIGHT ARTIFICIAL KNEE JOINT: ICD-10-CM

## 2023-03-06 PROCEDURE — 99204 OFFICE O/P NEW MOD 45 MIN: CPT

## 2023-03-06 PROCEDURE — 73562 X-RAY EXAM OF KNEE 3: CPT | Mod: 50

## 2023-03-06 NOTE — HISTORY OF PRESENT ILLNESS
[de-identified] : 72-year-old female status post right and left total knee replacement 14 years ago.  Doing well until 3 months ago patient fell on both knees.  Local symptoms subsided however approximately a week ago patient began to experience left knee pain with extension and associated inconsistent swelling.  Denies locking or giving out.  Denies fever chills.  Is ambulating independently

## 2023-03-06 NOTE — DISCUSSION/SUMMARY
[de-identified] : Impression right and left total knee replacement, rule out left knee pathological plica rule out polyethylene wear\par Plan MRI left knee, CBC sed rate CRP

## 2023-03-06 NOTE — PHYSICAL EXAM
[de-identified] : Constitutional:Well nourished , well developed and in no acute distress\par Psychiatric: Alert and oriented to time place and person.Appropriate affect \par Skin:Head, neck, arms and lower extremities:no lesions or discoloration\par HEENT: Normocephalic, EOM intact, Nasal septum midline,\par Respiratory: Unlabored respirations,no audible wheezing ,no tachypnea, no cyanosis\par Cardiovascular: no leg swelling  no ankle edema no JVD, pulse regular\par Vascular: no calf or thigh tenderness, \par Peripheral pulses; intact\par Lymphatics:No groin adenopathy,no lymphedema lower  or upper extremities\par Right knee incision healed no effusion flexion 0/115 ligaments intact\par Left knee effusion 1+ range of motion 0/120 visible and palpable snapping over medial aspect left knee tender medial tibial plateau and pes anserine ligaments intact mildly positive flexion distraction [de-identified] : X-ray right and left knee reveal no interval change in right and left knee total knee component alignment

## 2023-03-13 ENCOUNTER — NON-APPOINTMENT (OUTPATIENT)
Age: 73
End: 2023-03-13

## 2023-03-13 ENCOUNTER — APPOINTMENT (OUTPATIENT)
Dept: CARDIOLOGY | Facility: CLINIC | Age: 73
End: 2023-03-13
Payer: MEDICARE

## 2023-03-13 VITALS — SYSTOLIC BLOOD PRESSURE: 122 MMHG | HEART RATE: 56 BPM | DIASTOLIC BLOOD PRESSURE: 74 MMHG

## 2023-03-13 VITALS
TEMPERATURE: 97.5 F | OXYGEN SATURATION: 99 % | HEART RATE: 65 BPM | BODY MASS INDEX: 24.39 KG/M2 | WEIGHT: 121 LBS | RESPIRATION RATE: 17 BRPM | HEIGHT: 59 IN

## 2023-03-13 DIAGNOSIS — R70.0 ELEVATED ERYTHROCYTE SEDIMENTATION RATE: ICD-10-CM

## 2023-03-13 PROCEDURE — 99214 OFFICE O/P EST MOD 30 MIN: CPT | Mod: 25

## 2023-03-13 PROCEDURE — 93000 ELECTROCARDIOGRAM COMPLETE: CPT

## 2023-03-13 RX ORDER — NILOTINIB 150 MG/1
150 CAPSULE ORAL
Qty: 1 | Refills: 0 | Status: ACTIVE | COMMUNITY
Start: 2021-07-23

## 2023-03-13 NOTE — REASON FOR VISIT
[Coronary Artery Disease] : coronary artery disease [FreeTextEntry1] : Patient returns for followup. Feeling well. Offers no complaints of chest discomfort shortness of breath palpitations dizziness or syncope.\par

## 2023-03-13 NOTE — ASSESSMENT
[FreeTextEntry1] : Impression:\par 1. Coronary disease status post stenting currently asymptomatic\par 2. Hypertension well controlled\par 3. Hyperlipidemia not at goal at last check -for repeat blood work soon with pcp\par \par \par Plan:\par 1.  continue current regimen

## 2023-03-13 NOTE — PHYSICAL EXAM
[General Appearance - Well Developed] : well developed [Normal Appearance] : normal appearance [Well Groomed] : well groomed [General Appearance - Well Nourished] : well nourished [No Deformities] : no deformities [General Appearance - In No Acute Distress] : no acute distress [Normal Oral Mucosa] : normal oral mucosa [No Oral Pallor] : no oral pallor [No Oral Cyanosis] : no oral cyanosis [Normal Jugular Venous A Waves Present] : normal jugular venous A waves present [Normal Jugular Venous V Waves Present] : normal jugular venous V waves present [No Jugular Venous Triana A Waves] : no jugular venous triana A waves [Respiration, Rhythm And Depth] : normal respiratory rhythm and effort [Exaggerated Use Of Accessory Muscles For Inspiration] : no accessory muscle use [Auscultation Breath Sounds / Voice Sounds] : lungs were clear to auscultation bilaterally [Abdomen Soft] : soft [Abdomen Tenderness] : non-tender [Abdomen Mass (___ Cm)] : no abdominal mass palpated [Abnormal Walk] : normal gait [Gait - Sufficient For Exercise Testing] : the gait was sufficient for exercise testing [Nail Clubbing] : no clubbing of the fingernails [Cyanosis, Localized] : no localized cyanosis [Petechial Hemorrhages (___cm)] : no petechial hemorrhages [Skin Color & Pigmentation] : normal skin color and pigmentation [No Venous Stasis] : no venous stasis [] : no rash [Skin Lesions] : no skin lesions [No Skin Ulcers] : no skin ulcer [No Xanthoma] : no  xanthoma was observed [Oriented To Time, Place, And Person] : oriented to person, place, and time [Affect] : the affect was normal [Mood] : the mood was normal [No Anxiety] : not feeling anxious [5th Left ICS - MCL] : palpated at the 5th LICS in the midclavicular line [Normal] : normal [Normal Rate] : normal [Rhythm Regular] : regular [Normal S1] : normal S1 [Normal S2] : normal S2 [II] : a grade 2 [2+] : left 2+ [No Abnormalities] : the abdominal aorta was not enlarged and no bruit was heard [No Pitting Edema] : no pitting edema present [S3] : no S3 [S4] : no S4 [Right Carotid Bruit] : no bruit heard over the right carotid [Left Carotid Bruit] : no bruit heard over the left carotid [Right Femoral Bruit] : no bruit heard over the right femoral artery [Left Femoral Bruit] : no bruit heard over the left femoral artery

## 2023-03-15 NOTE — PATIENT PROFILE ADULT - ARE SIGNIFICANT INDICATORS COMPLETE.
fingers/toes warm to touch/no paresthesia/no swelling/no cyanosis of extremity/capillary refill time < 2 seconds Yes

## 2023-03-21 ENCOUNTER — APPOINTMENT (OUTPATIENT)
Dept: MRI IMAGING | Facility: CLINIC | Age: 73
End: 2023-03-21
Payer: MEDICARE

## 2023-03-21 ENCOUNTER — OUTPATIENT (OUTPATIENT)
Dept: OUTPATIENT SERVICES | Facility: HOSPITAL | Age: 73
LOS: 1 days | End: 2023-03-21
Payer: MEDICARE

## 2023-03-21 DIAGNOSIS — Z96.653 PRESENCE OF ARTIFICIAL KNEE JOINT, BILATERAL: Chronic | ICD-10-CM

## 2023-03-21 DIAGNOSIS — K46.9 UNSPECIFIED ABDOMINAL HERNIA WITHOUT OBSTRUCTION OR GANGRENE: Chronic | ICD-10-CM

## 2023-03-21 DIAGNOSIS — M25.562 PAIN IN LEFT KNEE: ICD-10-CM

## 2023-03-21 DIAGNOSIS — Z98.890 OTHER SPECIFIED POSTPROCEDURAL STATES: Chronic | ICD-10-CM

## 2023-03-21 PROCEDURE — 73721 MRI JNT OF LWR EXTRE W/O DYE: CPT | Mod: 26,LT

## 2023-03-21 PROCEDURE — 73721 MRI JNT OF LWR EXTRE W/O DYE: CPT

## 2023-03-28 ENCOUNTER — NON-APPOINTMENT (OUTPATIENT)
Age: 73
End: 2023-03-28

## 2023-03-30 ENCOUNTER — APPOINTMENT (OUTPATIENT)
Dept: FAMILY MEDICINE | Facility: CLINIC | Age: 73
End: 2023-03-30
Payer: MEDICARE

## 2023-03-30 VITALS
TEMPERATURE: 98.2 F | HEART RATE: 60 BPM | DIASTOLIC BLOOD PRESSURE: 63 MMHG | BODY MASS INDEX: 24.39 KG/M2 | HEIGHT: 59 IN | OXYGEN SATURATION: 96 % | RESPIRATION RATE: 16 BRPM | SYSTOLIC BLOOD PRESSURE: 130 MMHG | WEIGHT: 121 LBS

## 2023-03-30 PROCEDURE — 99214 OFFICE O/P EST MOD 30 MIN: CPT

## 2023-03-30 RX ORDER — ACYCLOVIR 50 MG/G
5 OINTMENT TOPICAL 3 TIMES DAILY
Qty: 1 | Refills: 5 | Status: COMPLETED | COMMUNITY
Start: 2021-03-09 | End: 2023-03-30

## 2023-03-30 RX ORDER — HYDROXYZINE HYDROCHLORIDE 10 MG/1
10 TABLET ORAL 3 TIMES DAILY
Qty: 90 | Refills: 3 | Status: COMPLETED | COMMUNITY
Start: 2022-11-16 | End: 2023-03-30

## 2023-03-30 NOTE — HEALTH RISK ASSESSMENT
[No] : In the past 12 months have you used drugs other than those required for medical reasons? No [Any fall with injury in past year] : Patient reported fall with injury in the past year [0] : 2) Feeling down, depressed, or hopeless: Not at all (0) [PHQ-2 Negative - No further assessment needed] : PHQ-2 Negative - No further assessment needed [Former] : Former [20 or more] : 20 or more [> 15 Years] : > 15 Years [Audit-CScore] : 0 [[id number]] : 0

## 2023-03-30 NOTE — HISTORY OF PRESENT ILLNESS
[FreeTextEntry1] : Please see HPI. [de-identified] : Patient is a 72-year-old female who presents today for follow-up and disease management with multiple underlying medical problems which include CML, ischemic heart disease, hypertension, reflux, hyperuricemia, hyperlipidemia, hypothyroidism, CKD stage III stable, and peripheral neuropathy.  Patient does admit to abdominal pain which has been persistent for the past 3 weeks.\par \par Presently the patient is awake alert and oriented x3 in no acute distress.

## 2023-03-30 NOTE — REVIEW OF SYSTEMS
[Abdominal Pain] : abdominal pain [Heartburn] : heartburn [Joint Pain] : joint pain [Joint Stiffness] : joint stiffness [Muscle Pain] : muscle pain [Back Pain] : back pain [Dizziness] : dizziness [Negative] : Heme/Lymph [Chest Pain] : no chest pain [Palpitations] : no palpitations [Leg Claudication] : no leg claudication [Lower Ext Edema] : no lower extremity edema [Orthopnea] : no orthopnea [Nausea] : no nausea [Constipation] : no constipation [Diarrhea] : diarrhea [Vomiting] : no vomiting [Melena] : no melena [Joint Swelling] : no joint swelling [Muscle Weakness] : no muscle weakness [Itching] : no itching [Mole Changes] : no mole changes [Nail Changes] : no nail changes [Hair Changes] : no hair changes [Skin Rash] : no skin rash [Headache] : no headache [Fainting] : no fainting [Confusion] : no confusion [Memory Loss] : no memory loss [Unsteady Walking] : no ataxia [de-identified] :  pruritic scalp resolved

## 2023-03-30 NOTE — ASSESSMENT
[FreeTextEntry1] : Assessment and plan:\par \par 1.  CML patient doing well she is followed up closely by hematology and she will continue Tasigna 150 mg 2 capsule twice a day on an empty stomach.\par \par 2.  Coronary artery disease well-controlled patient is chest pain-free follow-up with cardiology.\par \par 3.  Hypertension excellent blood pressure control patient is only taking enalapril 2.5 mg daily diltiazem and furosemide were discontinued while hospitalized.\par \par 4.  Hyperuricemia continue allopurinol 300 mg p.o. daily.\par \par 5.  Hypothyroidism continue levothyroxine 112 mcg daily at next visit I will be obtaining comprehensive blood work reviewed blood work from hospital.\par \par 6.  CKD stage III stable recommendations good hydration at least 64 ounces of fluids daily.\par \par 7.  Peripheral neuropathy continue gabapentin 400 mg twice a day.\par \par 8.  Abdominal discomfort presently the patient is feeling well but it appears to occur every time she has rice pudding and may be secondary to lactose intolerance therefore I recommend Lactaid and to avoid dairy products.

## 2023-03-30 NOTE — PHYSICAL EXAM
[No Acute Distress] : no acute distress [Well Nourished] : well nourished [Well Developed] : well developed [Normal Voice/Communication] : normal voice/communication [Ill-Appearing] : ill-appearing [Normal Sclera/Conjunctiva] : normal sclera/conjunctiva [PERRL] : pupils equal round and reactive to light [No JVD] : no jugular venous distention [No Lymphadenopathy] : no lymphadenopathy [Supple] : supple [Normal Rate] : normal rate  [Regular Rhythm] : with a regular rhythm [Normal S1, S2] : normal S1 and S2 [No Carotid Bruits] : no carotid bruits [No Abdominal Bruit] : a ~M bruit was not heard ~T in the abdomen [No Varicosities] : no varicosities [Pedal Pulses Present] : the pedal pulses are present [No Edema] : there was no peripheral edema [No Palpable Aorta] : no palpable aorta [No Extremity Clubbing/Cyanosis] : no extremity clubbing/cyanosis [Soft] : abdomen soft [Non Tender] : non-tender [Non-distended] : non-distended [No Masses] : no abdominal mass palpated [Normal Bowel Sounds] : normal bowel sounds [No Joint Swelling] : no joint swelling [Coordination Grossly Intact] : coordination grossly intact [No Focal Deficits] : no focal deficits [Normal Gait] : normal gait [Deep Tendon Reflexes (DTR)] : deep tendon reflexes were 2+ and symmetric [Speech Grossly Normal] : speech grossly normal [Memory Grossly Normal] : memory grossly normal [Normal Affect] : the affect was normal [Alert and Oriented x3] : oriented to person, place, and time [Normal Mood] : the mood was normal [Normal Insight/Judgement] : insight and judgment were intact [Normal] : affect was normal and insight and judgment were intact [de-identified] : Epigastric pain occasional physical exam unremarkable today [de-identified] : Back pain has improved

## 2023-04-26 ENCOUNTER — RX RENEWAL (OUTPATIENT)
Age: 73
End: 2023-04-26

## 2023-06-11 ENCOUNTER — RX RENEWAL (OUTPATIENT)
Age: 73
End: 2023-06-11

## 2023-06-21 DIAGNOSIS — R60.9 EDEMA, UNSPECIFIED: ICD-10-CM

## 2023-07-06 ENCOUNTER — APPOINTMENT (OUTPATIENT)
Dept: FAMILY MEDICINE | Facility: CLINIC | Age: 73
End: 2023-07-06
Payer: MEDICARE

## 2023-07-06 VITALS
WEIGHT: 125 LBS | OXYGEN SATURATION: 94 % | HEIGHT: 59 IN | BODY MASS INDEX: 25.2 KG/M2 | TEMPERATURE: 98.1 F | DIASTOLIC BLOOD PRESSURE: 68 MMHG | RESPIRATION RATE: 16 BRPM | HEART RATE: 72 BPM | SYSTOLIC BLOOD PRESSURE: 120 MMHG

## 2023-07-06 DIAGNOSIS — M51.36 OTHER INTERVERTEBRAL DISC DEGENERATION, LUMBAR REGION: ICD-10-CM

## 2023-07-06 DIAGNOSIS — N18.30 CHRONIC KIDNEY DISEASE, STAGE 3 UNSPECIFIED: ICD-10-CM

## 2023-07-06 PROCEDURE — 99214 OFFICE O/P EST MOD 30 MIN: CPT

## 2023-07-06 RX ORDER — COVID-19 ANTIGEN TEST
KIT MISCELLANEOUS
Qty: 8 | Refills: 0 | Status: COMPLETED | COMMUNITY
Start: 2023-04-25

## 2023-07-06 RX ORDER — TRAMADOL HYDROCHLORIDE 50 MG/1
50 TABLET, COATED ORAL
Qty: 40 | Refills: 1 | Status: ACTIVE | COMMUNITY
Start: 2023-07-06 | End: 1900-01-01

## 2023-07-06 NOTE — PHYSICAL EXAM
[No Acute Distress] : no acute distress [Well Nourished] : well nourished [Well Developed] : well developed [Normal Voice/Communication] : normal voice/communication [Ill-Appearing] : ill-appearing [Normal Sclera/Conjunctiva] : normal sclera/conjunctiva [PERRL] : pupils equal round and reactive to light [No JVD] : no jugular venous distention [No Lymphadenopathy] : no lymphadenopathy [Supple] : supple [Normal Rate] : normal rate  [Regular Rhythm] : with a regular rhythm [Normal S1, S2] : normal S1 and S2 [No Carotid Bruits] : no carotid bruits [No Abdominal Bruit] : a ~M bruit was not heard ~T in the abdomen [No Varicosities] : no varicosities [Pedal Pulses Present] : the pedal pulses are present [No Edema] : there was no peripheral edema [No Palpable Aorta] : no palpable aorta [No Extremity Clubbing/Cyanosis] : no extremity clubbing/cyanosis [Soft] : abdomen soft [Non Tender] : non-tender [Non-distended] : non-distended [No Masses] : no abdominal mass palpated [Normal Bowel Sounds] : normal bowel sounds [Coordination Grossly Intact] : coordination grossly intact [No Focal Deficits] : no focal deficits [Normal Gait] : normal gait [Deep Tendon Reflexes (DTR)] : deep tendon reflexes were 2+ and symmetric [Speech Grossly Normal] : speech grossly normal [Memory Grossly Normal] : memory grossly normal [Normal Affect] : the affect was normal [Alert and Oriented x3] : oriented to person, place, and time [Normal Mood] : the mood was normal [Normal Insight/Judgement] : insight and judgment were intact [Normal] : affect was normal and insight and judgment were intact [de-identified] : Epigastric pain occasional physical exam unremarkable today [de-identified] : Back pain has improved [de-identified] : Left knee joint effusion

## 2023-07-06 NOTE — ASSESSMENT
[FreeTextEntry1] : Assessment and plan:\par \par 1.  CML patient doing well she is followed up closely by hematology and she will continue Tasigna 150 mg 2 capsule twice a day on an empty stomach.\par \par 2.  Coronary artery disease well-controlled patient is chest pain-free follow-up with cardiology.\par \par 3.  Hypertension excellent blood pressure control patient is only taking enalapril 2.5 mg daily diltiazem and furosemide were discontinued while hospitalized.\par \par 4.  Hyperuricemia continue allopurinol 300 mg p.o. daily.\par \par 5.  Hypothyroidism continue levothyroxine 112 mcg daily at next visit I will be obtaining comprehensive blood work reviewed blood work from hospital.\par \par 6.  CKD stage III stable recommendations good hydration at least 64 ounces of fluids daily.\par \par 7.  Peripheral neuropathy continue gabapentin 400 mg twice a day.\par \par 8.  Abdominal discomfort presently the patient is feeling well but it appears to occur every time she has rice pudding and may be secondary to lactose intolerance therefore I recommend Lactaid and to avoid dairy products.\par \par 9.  Degenerative joint disease severe lower back pain patient unfortunately does not have pain medications available she will be following up with pain management for now I will cover patient with tramadol 50 mg up to 4 times a day as needed if necessary patient can add 500 mg of Tylenol to the tramadol.

## 2023-07-06 NOTE — HEALTH RISK ASSESSMENT
[No] : In the past 12 months have you used drugs other than those required for medical reasons? No [Any fall with injury in past year] : Patient reported fall with injury in the past year [0] : 2) Feeling down, depressed, or hopeless: Not at all (0) [PHQ-2 Negative - No further assessment needed] : PHQ-2 Negative - No further assessment needed [Former] : Former [5-9] : 5-9 [> 15 Years] : > 15 Years [Audit-CScore] : 0 [[id number]] : 0

## 2023-07-06 NOTE — HISTORY OF PRESENT ILLNESS
[FreeTextEntry1] : Please see HPI, patient complaining of severe pain due to degenerative joint disease pain medications are usually supplied by pain management and PMR unfortunately it appears either she misplaced her medications or the medications were lost or worse yet stolen. [de-identified] : Patient is a 73-year-old female who presents today for follow-up and disease management with multiple underlying medical problems which include CML, hypertension, CKD stage III, reflux, hyperuricemia, hyperlipidemia, coronary artery disease, chronic back pain secondary to severe degenerative joint disease of the lumbar spine and hypothyroidism.

## 2023-07-24 ENCOUNTER — NON-APPOINTMENT (OUTPATIENT)
Age: 73
End: 2023-07-24

## 2023-07-24 ENCOUNTER — APPOINTMENT (OUTPATIENT)
Dept: ORTHOPEDIC SURGERY | Facility: CLINIC | Age: 73
End: 2023-07-24
Payer: MEDICARE

## 2023-07-24 ENCOUNTER — APPOINTMENT (OUTPATIENT)
Dept: CARDIOLOGY | Facility: CLINIC | Age: 73
End: 2023-07-24
Payer: MEDICARE

## 2023-07-24 VITALS — BODY MASS INDEX: 24.74 KG/M2 | WEIGHT: 126 LBS | HEIGHT: 60 IN

## 2023-07-24 VITALS — SYSTOLIC BLOOD PRESSURE: 110 MMHG | DIASTOLIC BLOOD PRESSURE: 60 MMHG | HEART RATE: 50 BPM

## 2023-07-24 VITALS
BODY MASS INDEX: 26 KG/M2 | WEIGHT: 129 LBS | HEIGHT: 59 IN | OXYGEN SATURATION: 96 % | HEART RATE: 50 BPM | RESPIRATION RATE: 17 BRPM

## 2023-07-24 DIAGNOSIS — Z96.651 PRESENCE OF RIGHT ARTIFICIAL KNEE JOINT: ICD-10-CM

## 2023-07-24 DIAGNOSIS — Z96.652 PRESENCE OF LEFT ARTIFICIAL KNEE JOINT: ICD-10-CM

## 2023-07-24 PROCEDURE — 99214 OFFICE O/P EST MOD 30 MIN: CPT | Mod: 25

## 2023-07-24 PROCEDURE — 93000 ELECTROCARDIOGRAM COMPLETE: CPT

## 2023-07-24 PROCEDURE — 99213 OFFICE O/P EST LOW 20 MIN: CPT

## 2023-07-24 NOTE — DISCUSSION/SUMMARY
[de-identified] : Impression painful effusion left knee with MRI finding of synovial debris possible polyethylene wear rule out peripheral vascular disease rule out neuropathy versus stenosis\par Plan neurology follow-up, vascular consultation

## 2023-07-24 NOTE — HISTORY OF PRESENT ILLNESS
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  Doing well until 3 months ago patient fell on both knees.  Local symptoms subsided however approximately a week ago patient began to experience left knee pain with extension and associated inconsistent swelling.  Denies locking or giving out.  Denies fever chills.  Is ambulating independently.  Since last assessment continue to experience anterior knee pain and swelling ambulating independently medical history of leukemia for which she is undergoing treatment for the past 2 years has had chronic numbness left anterior leg and chronic low back pain she is scheduled to undergo neurologic evaluation this week

## 2023-07-24 NOTE — REASON FOR VISIT
[Coronary Artery Disease] : coronary artery disease [FreeTextEntry1] : Patient returns for follow-up.  At this time she offers no complaints.  She states that approximately 2 weeks ago for 2 weeks on and off she had chest pain.  She states it was not exertional lasting a minute at a time occasionally went to her back but did not go to her neck or arms there was no nausea vomiting or diaphoresis.  She is now attributing it to her air conditioning.

## 2023-07-24 NOTE — PHYSICAL EXAM
[de-identified] : Constitutional:Well nourished , well developed and in no acute distress\par Psychiatric: Alert and oriented to time place and person.Appropriate affect \par Skin:Head, neck, arms and lower extremities:no lesions or discoloration\par HEENT: Normocephalic, EOM intact, Nasal septum midline,\par Respiratory: Unlabored respirations,no audible wheezing ,no tachypnea, no cyanosis\par Cardiovascular: no leg swelling  no ankle edema no JVD, pulse regular\par Vascular: no calf or thigh tenderness, \par Peripheral pulses; dorsal pedal posterior tibial pulses not palpable\par Lymphatics:No groin adenopathy,no lymphedema lower  or upper extremities\par Right knee incision healed no effusion flexion 0/115 ligaments intact\par Left knee effusion 1+ range of motion 0/120 visible and palpable snapping over medial aspect left knee tender medial tibial plateau and pes anserine ligaments intact mildly positive flexion distraction [de-identified] : X-ray right and left knee reveal no interval change in right and left knee total knee component alignment

## 2023-07-24 NOTE — ASSESSMENT
[FreeTextEntry1] : Impression:\par 1. Coronary disease status post stenting last cardiac cath in 2020 no intervention needed\par 2. Hypertension well controlled\par 3. Hyperlipidemia not at goal at last check no blood work on chart for the past 13 months\par \par \par Plan:\par 1.  continue current regimen\par 2.  Full blood work today\par 3.  If no significant abnormalities would consider repeat ischemia evaluation

## 2023-07-26 LAB
T3 SERPL-MCNC: 68 NG/DL
T4 SERPL-MCNC: 7.3 UG/DL
TSH SERPL-ACNC: 2.52 UIU/ML

## 2023-07-31 LAB
ALBUMIN SERPL ELPH-MCNC: 4.4 G/DL
ALP BLD-CCNC: 95 U/L
ALT SERPL-CCNC: 24 U/L
ANION GAP SERPL CALC-SCNC: 13 MMOL/L
AST SERPL-CCNC: 29 U/L
BILIRUB SERPL-MCNC: 0.6 MG/DL
BUN SERPL-MCNC: 29 MG/DL
CALCIUM SERPL-MCNC: 9.6 MG/DL
CHLORIDE SERPL-SCNC: 106 MMOL/L
CHOLEST SERPL-MCNC: 155 MG/DL
CO2 SERPL-SCNC: 23 MMOL/L
CREAT SERPL-MCNC: 1.04 MG/DL
EGFR: 57 ML/MIN/1.73M2
GLUCOSE SERPL-MCNC: 89 MG/DL
HDLC SERPL-MCNC: 66 MG/DL
LDLC SERPL CALC-MCNC: 75 MG/DL
NONHDLC SERPL-MCNC: 89 MG/DL
POTASSIUM SERPL-SCNC: 4.4 MMOL/L
PROT SERPL-MCNC: 7.2 G/DL
SODIUM SERPL-SCNC: 143 MMOL/L
TRIGL SERPL-MCNC: 73 MG/DL

## 2023-08-26 NOTE — H&P PST ADULT - NSANTHOBSERVEDRD_ENT_A_CORE
As per patient "I have sciatica and it's acting up, my right lower back hurts me down my leg". Denies fall/trauma No

## 2023-09-06 NOTE — ED PROVIDER NOTE - ENMT NEGATIVE STATEMENT, MLM
Heart scan results and recommendations have been reviewed with patient by provider, cardiac care coordinator will not call patient at this time. Results letter and risk factor educational material sent to patient.      Ears: no ear pain and no hearing problems.Nose: no nasal congestion and no nasal drainage.Mouth/Throat: no dysphagia, no hoarseness and no throat pain.Neck: no lumps, no pain, no stiffness and no swollen glands.

## 2023-10-01 PROBLEM — K52.9 ENTERITIS: Status: ACTIVE | Noted: 2018-03-16

## 2023-10-05 ENCOUNTER — APPOINTMENT (OUTPATIENT)
Dept: FAMILY MEDICINE | Facility: CLINIC | Age: 73
End: 2023-10-05
Payer: MEDICARE

## 2023-10-05 VITALS
RESPIRATION RATE: 14 BRPM | WEIGHT: 126 LBS | TEMPERATURE: 98 F | SYSTOLIC BLOOD PRESSURE: 109 MMHG | HEART RATE: 77 BPM | BODY MASS INDEX: 24.74 KG/M2 | HEIGHT: 60 IN | DIASTOLIC BLOOD PRESSURE: 63 MMHG | OXYGEN SATURATION: 97 %

## 2023-10-05 DIAGNOSIS — M54.9 DORSALGIA, UNSPECIFIED: ICD-10-CM

## 2023-10-05 PROCEDURE — G0008: CPT

## 2023-10-05 PROCEDURE — 90662 IIV NO PRSV INCREASED AG IM: CPT

## 2023-10-05 PROCEDURE — 99214 OFFICE O/P EST MOD 30 MIN: CPT | Mod: 25

## 2023-10-05 PROCEDURE — 36415 COLL VENOUS BLD VENIPUNCTURE: CPT

## 2023-10-05 RX ORDER — CYCLOBENZAPRINE HYDROCHLORIDE 10 MG/1
10 TABLET, FILM COATED ORAL 3 TIMES DAILY
Qty: 60 | Refills: 1 | Status: ACTIVE | COMMUNITY
Start: 2023-10-05 | End: 1900-01-01

## 2023-10-06 LAB
ALBUMIN SERPL ELPH-MCNC: 4.1 G/DL
ALP BLD-CCNC: 77 U/L
ALT SERPL-CCNC: 12 U/L
ANION GAP SERPL CALC-SCNC: 10 MMOL/L
AST SERPL-CCNC: 24 U/L
BASOPHILS # BLD AUTO: 0.07 K/UL
BASOPHILS NFR BLD AUTO: 1.5 %
BILIRUB SERPL-MCNC: 0.4 MG/DL
BUN SERPL-MCNC: 19 MG/DL
CALCIUM SERPL-MCNC: 9.2 MG/DL
CHLORIDE SERPL-SCNC: 105 MMOL/L
CHOLEST SERPL-MCNC: 162 MG/DL
CO2 SERPL-SCNC: 25 MMOL/L
CREAT SERPL-MCNC: 0.97 MG/DL
EGFR: 62 ML/MIN/1.73M2
EOSINOPHIL # BLD AUTO: 0.23 K/UL
EOSINOPHIL NFR BLD AUTO: 4.9 %
FERRITIN SERPL-MCNC: 69 NG/ML
FOLATE SERPL-MCNC: >20 NG/ML
GLUCOSE SERPL-MCNC: 119 MG/DL
HCT VFR BLD CALC: 31.9 %
HDLC SERPL-MCNC: 62 MG/DL
HGB BLD-MCNC: 9.7 G/DL
IMM GRANULOCYTES NFR BLD AUTO: 0.2 %
IRON SATN MFR SERPL: 23 %
IRON SERPL-MCNC: 66 UG/DL
LDLC SERPL CALC-MCNC: 89 MG/DL
LYMPHOCYTES # BLD AUTO: 1.72 K/UL
LYMPHOCYTES NFR BLD AUTO: 36.5 %
MAN DIFF?: NORMAL
MCHC RBC-ENTMCNC: 30.4 GM/DL
MCHC RBC-ENTMCNC: 30.5 PG
MCV RBC AUTO: 100.3 FL
MONOCYTES # BLD AUTO: 0.35 K/UL
MONOCYTES NFR BLD AUTO: 7.4 %
NEUTROPHILS # BLD AUTO: 2.33 K/UL
NEUTROPHILS NFR BLD AUTO: 49.5 %
NONHDLC SERPL-MCNC: 100 MG/DL
PLATELET # BLD AUTO: 200 K/UL
POTASSIUM SERPL-SCNC: 4.8 MMOL/L
PROT SERPL-MCNC: 6.7 G/DL
RBC # BLD: 3.18 M/UL
RBC # FLD: 14 %
SODIUM SERPL-SCNC: 140 MMOL/L
T3FREE SERPL-MCNC: 1.65 PG/ML
T4 FREE SERPL-MCNC: 1.2 NG/DL
TIBC SERPL-MCNC: 287 UG/DL
TRIGL SERPL-MCNC: 55 MG/DL
TSH SERPL-ACNC: 3.67 UIU/ML
UIBC SERPL-MCNC: 221 UG/DL
VIT B12 SERPL-MCNC: 458 PG/ML
WBC # FLD AUTO: 4.71 K/UL

## 2023-10-16 ENCOUNTER — APPOINTMENT (OUTPATIENT)
Dept: CARDIOLOGY | Facility: CLINIC | Age: 73
End: 2023-10-16
Payer: MEDICARE

## 2023-10-16 ENCOUNTER — NON-APPOINTMENT (OUTPATIENT)
Age: 73
End: 2023-10-16

## 2023-10-16 VITALS — WEIGHT: 131 LBS | OXYGEN SATURATION: 97 % | HEART RATE: 59 BPM | HEIGHT: 60 IN | BODY MASS INDEX: 25.72 KG/M2

## 2023-10-16 VITALS — DIASTOLIC BLOOD PRESSURE: 60 MMHG | HEART RATE: 60 BPM | SYSTOLIC BLOOD PRESSURE: 124 MMHG

## 2023-10-16 PROCEDURE — 99214 OFFICE O/P EST MOD 30 MIN: CPT | Mod: 25

## 2023-10-16 PROCEDURE — 93000 ELECTROCARDIOGRAM COMPLETE: CPT

## 2023-11-27 ENCOUNTER — RX RENEWAL (OUTPATIENT)
Age: 73
End: 2023-11-27

## 2023-11-28 NOTE — ASU PATIENT PROFILE, ADULT - CAREGIVER NAME
Airway    Performed by: Keily Patton  Authorized by: Tony Eason MD    Final Airway Type:  Endotracheal airway  Final Endotracheal Airway*:  ETT  ETT Size (mm)*:  7.0  Cuff*:  Regular  Technique Used for Successful ETT Placement:  Video laryngoscopy  Devices/Methods Used in Placement*:  Mask and Stylet  Intubation Procedure*:  ETCO2, Preoxygenation, Atraumatic, Dentition Unchanged and Pharynx Clear  Insertion Site:  Oral  Blade Type*:  Video Laryngoscope  Blade Size*:  3  Cuff Volume (mL):  8  Measured from*:  Lips  Secured at (cm)*:  22  Placement Verified by: auscultation, capnometry and equal breath sounds    Glottic View*:  1 - full view of glottis  Attempts*:  1   Patient Identified, Procedure confirmed, Emergency equipment available and Safety protocols followed  Location:  OR  Urgency:  Elective  Difficult Airway: No    Indications for Airway Management:  Anesthesia  Spontaneous Ventilation: absent    Sedation Level:  Deep  Mask Difficulty Assessment:  1 - vent by mask  Start Time: 11/28/2023 10:12 AM       Brady Man

## 2023-12-07 ENCOUNTER — APPOINTMENT (OUTPATIENT)
Dept: FAMILY MEDICINE | Facility: CLINIC | Age: 73
End: 2023-12-07
Payer: MEDICARE

## 2023-12-07 VITALS
BODY MASS INDEX: 25.32 KG/M2 | WEIGHT: 129 LBS | HEIGHT: 60 IN | OXYGEN SATURATION: 98 % | SYSTOLIC BLOOD PRESSURE: 125 MMHG | DIASTOLIC BLOOD PRESSURE: 70 MMHG | HEART RATE: 75 BPM | TEMPERATURE: 97.7 F | RESPIRATION RATE: 16 BRPM

## 2023-12-07 PROCEDURE — 99214 OFFICE O/P EST MOD 30 MIN: CPT

## 2024-01-13 ENCOUNTER — RX RENEWAL (OUTPATIENT)
Age: 74
End: 2024-01-13

## 2024-01-23 ENCOUNTER — APPOINTMENT (OUTPATIENT)
Dept: CARDIOLOGY | Facility: CLINIC | Age: 74
End: 2024-01-23
Payer: MEDICARE

## 2024-01-23 ENCOUNTER — NON-APPOINTMENT (OUTPATIENT)
Age: 74
End: 2024-01-23

## 2024-01-23 VITALS
OXYGEN SATURATION: 95 % | DIASTOLIC BLOOD PRESSURE: 64 MMHG | WEIGHT: 125 LBS | HEIGHT: 60 IN | SYSTOLIC BLOOD PRESSURE: 113 MMHG | HEART RATE: 70 BPM | BODY MASS INDEX: 24.54 KG/M2

## 2024-01-23 VITALS — DIASTOLIC BLOOD PRESSURE: 60 MMHG | SYSTOLIC BLOOD PRESSURE: 112 MMHG | HEART RATE: 72 BPM

## 2024-01-23 PROCEDURE — 93000 ELECTROCARDIOGRAM COMPLETE: CPT

## 2024-01-23 PROCEDURE — 99214 OFFICE O/P EST MOD 30 MIN: CPT | Mod: 25

## 2024-01-23 RX ORDER — LOVASTATIN 40 MG/1
40 TABLET ORAL
Qty: 90 | Refills: 3 | Status: ACTIVE | COMMUNITY
Start: 2023-02-20 | End: 1900-01-01

## 2024-01-23 NOTE — ASSESSMENT
[FreeTextEntry1] : Impression: 1.  Coronary artery disease status post stenting in the past 2.  Hypertension well-controlled 3.  Hyperlipidemia now on increased statin dose l 4.  Atypical chest pain most probably noncardiac in nature  Plan: 1.  Lipid profile liver profile and CPK 2.  Continue current medical regimen

## 2024-01-23 NOTE — REASON FOR VISIT
[Hyperlipidemia] : hyperlipidemia [Hypertension] : hypertension [Coronary Artery Disease] : coronary artery disease [FreeTextEntry1] : Patient returns for follow-up.  She is doing fairly well.  She offers no complaints of shortness of breath palpitations dizziness or syncope.  She did have 1 nonexertional left-sided sharp pain last week.  She does not remember how long it lasted.  She is tolerating the increased dose of lovastatin.

## 2024-02-06 ENCOUNTER — APPOINTMENT (OUTPATIENT)
Dept: CARDIOLOGY | Facility: CLINIC | Age: 74
End: 2024-02-06
Payer: MEDICARE

## 2024-02-06 VITALS
HEART RATE: 57 BPM | SYSTOLIC BLOOD PRESSURE: 115 MMHG | WEIGHT: 128 LBS | DIASTOLIC BLOOD PRESSURE: 66 MMHG | BODY MASS INDEX: 25.13 KG/M2 | HEIGHT: 60 IN

## 2024-02-06 VITALS — SYSTOLIC BLOOD PRESSURE: 108 MMHG | HEART RATE: 72 BPM | DIASTOLIC BLOOD PRESSURE: 60 MMHG

## 2024-02-06 DIAGNOSIS — R07.89 OTHER CHEST PAIN: ICD-10-CM

## 2024-02-06 DIAGNOSIS — I25.10 ATHEROSCLEROTIC HEART DISEASE OF NATIVE CORONARY ARTERY W/OUT ANGINA PECTORIS: ICD-10-CM

## 2024-02-06 PROCEDURE — 99215 OFFICE O/P EST HI 40 MIN: CPT | Mod: 25

## 2024-02-06 PROCEDURE — 93000 ELECTROCARDIOGRAM COMPLETE: CPT

## 2024-02-06 NOTE — REASON FOR VISIT
[Other: ____] : [unfilled] [FreeTextEntry1] : Patient returns for follow-up.  She states that since the day after she was last here she had developed frequent episodes of left-sided chest pain.  She states they would not exertion related.  She states they were unlikely pain that she had prior to her stents.  There was no shortness of breath associated with that it would last for several hours at a time.  She states she has not had 1 since yesterday morning.

## 2024-02-06 NOTE — ASSESSMENT
[FreeTextEntry1] : Impression: 1.  Atypical chest pain times several weeks in a patient with a history of coronary disease.  Pain does not sound anginal in nature although cannot entirely rule it out.  Last cardiac catheterization in 2022 revealed stents to be patent and had minimal nonobstructive disease.  Plan: 1.  For now we will get vasodilator myocardial perfusion imaging 2.  Have told patient that if her pain returns she should go to the emergency room so that she could have troponins drawn.

## 2024-03-05 ENCOUNTER — APPOINTMENT (OUTPATIENT)
Dept: CARDIOLOGY | Facility: CLINIC | Age: 74
End: 2024-03-05

## 2024-03-20 ENCOUNTER — APPOINTMENT (OUTPATIENT)
Dept: FAMILY MEDICINE | Facility: CLINIC | Age: 74
End: 2024-03-20
Payer: MEDICARE

## 2024-03-20 VITALS
DIASTOLIC BLOOD PRESSURE: 56 MMHG | RESPIRATION RATE: 17 BRPM | BODY MASS INDEX: 25.91 KG/M2 | OXYGEN SATURATION: 95 % | WEIGHT: 132 LBS | TEMPERATURE: 97.2 F | SYSTOLIC BLOOD PRESSURE: 118 MMHG | HEIGHT: 60 IN | HEART RATE: 71 BPM

## 2024-03-20 PROCEDURE — G2211 COMPLEX E/M VISIT ADD ON: CPT

## 2024-03-20 PROCEDURE — 36415 COLL VENOUS BLD VENIPUNCTURE: CPT

## 2024-03-20 PROCEDURE — 99215 OFFICE O/P EST HI 40 MIN: CPT

## 2024-03-20 RX ORDER — FUROSEMIDE 40 MG/1
40 TABLET ORAL
Qty: 90 | Refills: 3 | Status: ACTIVE | COMMUNITY
Start: 2023-06-21 | End: 1900-01-01

## 2024-03-20 NOTE — ASSESSMENT
[FreeTextEntry1] : Assessment and plan:  1.  CML presently well-controlled patient is followed closely by hematology oncology she will continue to Tasigna 150 mg 2 capsules in the a.m. and 2 capsules in the p.m. patient tolerating medications she does have some minor side effects.  2.  Hypertension continue present medical management with enalapril 2.5 mg patient recently seen by cardiology found to have fluid retention and furosemide has been increased to 40 mg 1 tablet daily.  3.  Gastroesophageal reflux disease patient states that she is feeling well with pantoprazole 40 mg p.o. daily.  4.  Hyperuricemia continue allopurinol 300 mg daily I will be checking comprehensive blood work I will check kidney functions if kidney functions are severely compromised I will decrease the dose of allopurinol to 100 mg.  5.  Hyperlipidemia patient does follow a low-fat low-cholesterol diet and patient will continue lovastatin 40 mg at bedtime.  6.  Hypothyroidism check TSH, free T3 and free T4 for now continue levothyroxine 112 mcg daily.  7.  Lumbar stenosis with lumbar radiculopathy and peripheral neuropathy continue gabapentin 400 mg twice a day.  8.  Comprehensive blood work drawn in office by examiner, reviewed with patient all medications necessary refills were sent to the pharmacy.  9.  Total time spent face-to-face and non-face-to-face time 45 minutes the majority of which was spent on counseling and coordination of care.  Patient is compliant with diet and medications.

## 2024-03-20 NOTE — PHYSICAL EXAM
[No Acute Distress] : no acute distress [Well Nourished] : well nourished [Well Developed] : well developed [Normal Voice/Communication] : normal voice/communication [Ill-Appearing] : ill-appearing [Normal Sclera/Conjunctiva] : normal sclera/conjunctiva [PERRL] : pupils equal round and reactive to light [No Lymphadenopathy] : no lymphadenopathy [No JVD] : no jugular venous distention [Supple] : supple [Normal Rate] : normal rate  [Regular Rhythm] : with a regular rhythm [Normal S1, S2] : normal S1 and S2 [No Carotid Bruits] : no carotid bruits [No Abdominal Bruit] : a ~M bruit was not heard ~T in the abdomen [No Varicosities] : no varicosities [Pedal Pulses Present] : the pedal pulses are present [No Edema] : there was no peripheral edema [No Palpable Aorta] : no palpable aorta [No Extremity Clubbing/Cyanosis] : no extremity clubbing/cyanosis [Non Tender] : non-tender [Soft] : abdomen soft [No Masses] : no abdominal mass palpated [Non-distended] : non-distended [Normal Bowel Sounds] : normal bowel sounds [Coordination Grossly Intact] : coordination grossly intact [No Focal Deficits] : no focal deficits [Normal Gait] : normal gait [Deep Tendon Reflexes (DTR)] : deep tendon reflexes were 2+ and symmetric [Speech Grossly Normal] : speech grossly normal [Normal Affect] : the affect was normal [Memory Grossly Normal] : memory grossly normal [Alert and Oriented x3] : oriented to person, place, and time [Normal Mood] : the mood was normal [Normal Insight/Judgement] : insight and judgment were intact [Normal] : affect was normal and insight and judgment were intact [de-identified] : Epigastric pain occasional physical exam unremarkable today [de-identified] : Back pain has improved [de-identified] : Left knee joint effusion

## 2024-03-20 NOTE — COUNSELING
[Fall prevention counseling provided] : Fall prevention counseling provided [Adequate lighting] : Adequate lighting [No throw rugs] : No throw rugs [Use recommended devices] : Use recommended devices [Use proper foot wear] : Use proper foot wear [FreeTextEntry1] : cane and walker [Sleep ___ hours/day] : Sleep [unfilled] hours/day [Behavioral health counseling provided] : Behavioral health counseling provided [Plan in advance] : Plan in advance [Engage in a relaxing activity] : Engage in a relaxing activity [None] : None [AUDIT-C Screening administered and reviewed] : AUDIT-C Screening administered and reviewed [Good understanding] : Patient has a good understanding of lifestyle changes and steps needed to achieve self management goal

## 2024-03-20 NOTE — REVIEW OF SYSTEMS
[Chest Pain] : no chest pain [Palpitations] : no palpitations [Leg Claudication] : no leg claudication [Orthopnea] : no orthopnea [Lower Ext Edema] : no lower extremity edema [Abdominal Pain] : abdominal pain [Constipation] : no constipation [Nausea] : no nausea [Diarrhea] : diarrhea [Vomiting] : no vomiting [Melena] : no melena [Heartburn] : heartburn [Joint Pain] : joint pain [Joint Stiffness] : joint stiffness [Joint Swelling] : no joint swelling [Muscle Pain] : muscle pain [Muscle Weakness] : no muscle weakness [Back Pain] : back pain [Mole Changes] : no mole changes [Itching] : no itching [Nail Changes] : no nail changes [Hair Changes] : no hair changes [Skin Rash] : no skin rash [Headache] : no headache [Dizziness] : dizziness [Fainting] : no fainting [Memory Loss] : no memory loss [Confusion] : no confusion [Unsteady Walking] : no ataxia [Negative] : Psychiatric [de-identified] :  pruritic scalp resolved

## 2024-03-20 NOTE — HISTORY OF PRESENT ILLNESS
[FreeTextEntry1] : Follow-up and disease management. [de-identified] : Patient is a 73-year-old female who presents today for follow-up and disease management medical history significant for CML, hypertension, reflux, hyperuricemia, hyperlipidemia, hypothyroidism and peripheral neuropathy secondary to lumbar radiculopathy and lumbar stenosis.  Presently the patient is awake alert and oriented x 3 in no acute distress calm and cooperative.  Patient recently seen by hematology basically was given a clean bill of health patient needs to continue all of her medications.  At today's visit I will be obtaining comprehensive blood work.

## 2024-03-20 NOTE — CURRENT MEDS
[None] : Patient does not have any barriers to medication adherence [Takes medication as prescribed] : takes [No] : Did not review medication list for presence of high-risk medications.

## 2024-03-20 NOTE — HEALTH RISK ASSESSMENT
[No] : No [No falls in past year] : Patient reported no falls in the past year [Little interest or pleasure doing things] : 1) Little interest or pleasure doing things [Feeling down, depressed, or hopeless] : 2) Feeling down, depressed, or hopeless [PHQ-2 Negative - No further assessment needed] : PHQ-2 Negative - No further assessment needed [0] : 2) Feeling down, depressed, or hopeless: Not at all (0) [Audit-CScore] : 0 [[id number]] : 0 [With Patient/Caregiver] : , with patient/caregiver [Designated Healthcare Proxy] : Designated healthcare proxy [Reviewed no changes] : Reviewed, no changes [Name: ___] : Health Care Proxy's Name: [unfilled]  [Relationship: ___] : Relationship: [unfilled] [Aggressive treatment] : aggressive treatment [AdvancecareDate] : 03/24 [I will adhere to the patient's wishes.] : I will adhere to the patient's wishes. [5-9] : 5-9 [Former] : Former [> 15 Years] : > 15 Years

## 2024-03-21 LAB
ALBUMIN SERPL ELPH-MCNC: 3.9 G/DL
ALP BLD-CCNC: 65 U/L
ALT SERPL-CCNC: 12 U/L
ANION GAP SERPL CALC-SCNC: 9 MMOL/L
AST SERPL-CCNC: 24 U/L
BASOPHILS # BLD AUTO: 0.03 K/UL
BASOPHILS NFR BLD AUTO: 0.6 %
BILIRUB SERPL-MCNC: 0.4 MG/DL
BUN SERPL-MCNC: 33 MG/DL
CALCIUM SERPL-MCNC: 8.9 MG/DL
CHLORIDE SERPL-SCNC: 109 MMOL/L
CO2 SERPL-SCNC: 25 MMOL/L
CREAT SERPL-MCNC: 1.04 MG/DL
EGFR: 57 ML/MIN/1.73M2
EOSINOPHIL # BLD AUTO: 0.21 K/UL
EOSINOPHIL NFR BLD AUTO: 4.5 %
FERRITIN SERPL-MCNC: 57 NG/ML
FOLATE SERPL-MCNC: >20 NG/ML
GLUCOSE SERPL-MCNC: 126 MG/DL
HCT VFR BLD CALC: 31.3 %
HGB BLD-MCNC: 9.7 G/DL
IMM GRANULOCYTES NFR BLD AUTO: 0.2 %
IRON SATN MFR SERPL: 12 %
IRON SERPL-MCNC: 33 UG/DL
LYMPHOCYTES # BLD AUTO: 1.63 K/UL
LYMPHOCYTES NFR BLD AUTO: 35.3 %
MAN DIFF?: NORMAL
MCHC RBC-ENTMCNC: 30.5 PG
MCHC RBC-ENTMCNC: 31 GM/DL
MCV RBC AUTO: 98.4 FL
MONOCYTES # BLD AUTO: 0.4 K/UL
MONOCYTES NFR BLD AUTO: 8.7 %
NEUTROPHILS # BLD AUTO: 2.34 K/UL
NEUTROPHILS NFR BLD AUTO: 50.7 %
PLATELET # BLD AUTO: 150 K/UL
POTASSIUM SERPL-SCNC: 4.5 MMOL/L
PROT SERPL-MCNC: 6.4 G/DL
RBC # BLD: 3.18 M/UL
RBC # FLD: 16 %
SODIUM SERPL-SCNC: 144 MMOL/L
T3FREE SERPL-MCNC: 1.82 PG/ML
T4 FREE SERPL-MCNC: 1.1 NG/DL
TIBC SERPL-MCNC: 277 UG/DL
TSH SERPL-ACNC: 5.03 UIU/ML
UIBC SERPL-MCNC: 243 UG/DL
VIT B12 SERPL-MCNC: 378 PG/ML
WBC # FLD AUTO: 4.62 K/UL

## 2024-03-25 ENCOUNTER — RX RENEWAL (OUTPATIENT)
Age: 74
End: 2024-03-25

## 2024-03-25 RX ORDER — PANTOPRAZOLE 40 MG/1
40 TABLET, DELAYED RELEASE ORAL
Qty: 90 | Refills: 3 | Status: ACTIVE | COMMUNITY
Start: 2023-01-25 | End: 1900-01-01

## 2024-03-26 ENCOUNTER — RX RENEWAL (OUTPATIENT)
Age: 74
End: 2024-03-26

## 2024-04-12 ENCOUNTER — APPOINTMENT (OUTPATIENT)
Dept: FAMILY MEDICINE | Facility: CLINIC | Age: 74
End: 2024-04-12
Payer: MEDICARE

## 2024-04-12 VITALS
SYSTOLIC BLOOD PRESSURE: 128 MMHG | WEIGHT: 131 LBS | OXYGEN SATURATION: 100 % | HEART RATE: 93 BPM | BODY MASS INDEX: 25.72 KG/M2 | HEIGHT: 60 IN | DIASTOLIC BLOOD PRESSURE: 64 MMHG

## 2024-04-12 DIAGNOSIS — J01.10 ACUTE FRONTAL SINUSITIS, UNSPECIFIED: ICD-10-CM

## 2024-04-12 PROCEDURE — 99214 OFFICE O/P EST MOD 30 MIN: CPT

## 2024-04-12 RX ORDER — AMOXICILLIN AND CLAVULANATE POTASSIUM 500; 125 MG/1; MG/1
500-125 TABLET, FILM COATED ORAL
Qty: 14 | Refills: 0 | Status: ACTIVE | COMMUNITY
Start: 2024-04-12 | End: 1900-01-01

## 2024-04-12 NOTE — ASSESSMENT
[FreeTextEntry1] : Bacterial sinusitis versus head cold Patient very adamant that she would like antibiotics Discussed risk versus benefits Using patient in decision making we will prescribe Augmentin for 7 days Discussed symptomatic relief including warm liquids, rest, Tylenol, and Flonase Follow-up or return back to office if symptoms are persistent

## 2024-04-12 NOTE — PHYSICAL EXAM
[No Acute Distress] : no acute distress [Well Nourished] : well nourished [Well Developed] : well developed [Well-Appearing] : well-appearing [Normal Sclera/Conjunctiva] : normal sclera/conjunctiva [PERRL] : pupils equal round and reactive to light [EOMI] : extraocular movements intact [Normal Outer Ear/Nose] : the outer ears and nose were normal in appearance [Normal Oropharynx] : the oropharynx was normal [No JVD] : no jugular venous distention [No Lymphadenopathy] : no lymphadenopathy [Supple] : supple [Thyroid Normal, No Nodules] : the thyroid was normal and there were no nodules present [No Respiratory Distress] : no respiratory distress  [No Accessory Muscle Use] : no accessory muscle use [Clear to Auscultation] : lungs were clear to auscultation bilaterally [Normal Rate] : normal rate  [Regular Rhythm] : with a regular rhythm [Normal S1, S2] : normal S1 and S2 [No Murmur] : no murmur heard [No Carotid Bruits] : no carotid bruits [No Abdominal Bruit] : a ~M bruit was not heard ~T in the abdomen [No Varicosities] : no varicosities [Pedal Pulses Present] : the pedal pulses are present [No Edema] : there was no peripheral edema [No Palpable Aorta] : no palpable aorta [No Extremity Clubbing/Cyanosis] : no extremity clubbing/cyanosis [Soft] : abdomen soft [Non Tender] : non-tender [Non-distended] : non-distended [No Masses] : no abdominal mass palpated [No HSM] : no HSM [Normal Bowel Sounds] : normal bowel sounds [Normal Posterior Cervical Nodes] : no posterior cervical lymphadenopathy [Normal Anterior Cervical Nodes] : no anterior cervical lymphadenopathy [No CVA Tenderness] : no CVA  tenderness [No Spinal Tenderness] : no spinal tenderness [No Joint Swelling] : no joint swelling [Grossly Normal Strength/Tone] : grossly normal strength/tone [No Rash] : no rash [Coordination Grossly Intact] : coordination grossly intact [No Focal Deficits] : no focal deficits [Normal Gait] : normal gait [Deep Tendon Reflexes (DTR)] : deep tendon reflexes were 2+ and symmetric [Normal Affect] : the affect was normal [Normal Insight/Judgement] : insight and judgment were intact [de-identified] : Injected nasal turbinates

## 2024-04-12 NOTE — HISTORY OF PRESENT ILLNESS
[FreeTextEntry8] : A couple days ago developed headaches, runny nose - with thick nasal discharge, malaise, nasal congestion, intermittent cough that has been worsening over the past couple of days. Tested today and was negative for rapid strep and flu Headaches described as frontal pressure-like headaches History of CML in remission

## 2024-04-12 NOTE — REVIEW OF SYSTEMS
[Fatigue] : fatigue [Nasal Discharge] : nasal discharge [Headache] : headache [Negative] : Heme/Lymph [FreeTextEntry4] : Frontal sinus percussion or tenderness

## 2024-04-15 LAB
BACTERIA THROAT CULT: NORMAL
INFLUENZA A RESULT: NOT DETECTED
INFLUENZA B RESULT: NOT DETECTED
RESP SYN VIRUS RESULT: NOT DETECTED
SARS-COV-2 RESULT: NOT DETECTED

## 2024-05-29 ENCOUNTER — NON-APPOINTMENT (OUTPATIENT)
Age: 74
End: 2024-05-29

## 2024-05-29 ENCOUNTER — APPOINTMENT (OUTPATIENT)
Dept: CARDIOLOGY | Facility: CLINIC | Age: 74
End: 2024-05-29
Payer: MEDICARE

## 2024-05-29 VITALS — BODY MASS INDEX: 25.52 KG/M2 | OXYGEN SATURATION: 99 % | WEIGHT: 130 LBS | HEIGHT: 60 IN | HEART RATE: 67 BPM

## 2024-05-29 VITALS — SYSTOLIC BLOOD PRESSURE: 132 MMHG | HEART RATE: 68 BPM | DIASTOLIC BLOOD PRESSURE: 60 MMHG

## 2024-05-29 PROCEDURE — G2211 COMPLEX E/M VISIT ADD ON: CPT

## 2024-05-29 PROCEDURE — 99214 OFFICE O/P EST MOD 30 MIN: CPT

## 2024-05-29 PROCEDURE — 93000 ELECTROCARDIOGRAM COMPLETE: CPT

## 2024-05-29 NOTE — ASSESSMENT
[FreeTextEntry1] :  pression: 1.  Coronary artery disease status post stenting currently doing well Numeral 2.  Dyslipidemia tolerating increased dose of atorvastatin started several months ago  Plan: 1.  For now continue current medical regimen  2.  Lipid profile liver profile and CPK soon

## 2024-05-29 NOTE — REASON FOR VISIT
[Hyperlipidemia] : hyperlipidemia [Coronary Artery Disease] : coronary artery disease [FreeTextEntry1] : Patient returns for followup. Feeling well. Offers no complaints of chest discomfort shortness of breath palpitations dizziness or syncope.

## 2024-06-12 ENCOUNTER — RX RENEWAL (OUTPATIENT)
Age: 74
End: 2024-06-12

## 2024-06-12 RX ORDER — FLUTICASONE PROPIONATE 50 UG/1
50 SPRAY, METERED NASAL DAILY
Qty: 16 | Refills: 0 | Status: ACTIVE | COMMUNITY
Start: 2024-04-12 | End: 1900-01-01

## 2024-06-22 ENCOUNTER — RX RENEWAL (OUTPATIENT)
Age: 74
End: 2024-06-22

## 2024-06-26 ENCOUNTER — APPOINTMENT (OUTPATIENT)
Dept: FAMILY MEDICINE | Facility: CLINIC | Age: 74
End: 2024-06-26
Payer: MEDICARE

## 2024-06-26 VITALS
DIASTOLIC BLOOD PRESSURE: 70 MMHG | OXYGEN SATURATION: 96 % | WEIGHT: 131 LBS | HEART RATE: 73 BPM | TEMPERATURE: 97.6 F | SYSTOLIC BLOOD PRESSURE: 118 MMHG | BODY MASS INDEX: 25.72 KG/M2 | HEIGHT: 60 IN | RESPIRATION RATE: 16 BRPM

## 2024-06-26 DIAGNOSIS — M10.9 GOUT, UNSPECIFIED: ICD-10-CM

## 2024-06-26 DIAGNOSIS — M48.07 SPINAL STENOSIS, LUMBOSACRAL REGION: ICD-10-CM

## 2024-06-26 DIAGNOSIS — E03.9 HYPOTHYROIDISM, UNSPECIFIED: ICD-10-CM

## 2024-06-26 DIAGNOSIS — G62.9 POLYNEUROPATHY, UNSPECIFIED: ICD-10-CM

## 2024-06-26 DIAGNOSIS — I10 ESSENTIAL (PRIMARY) HYPERTENSION: ICD-10-CM

## 2024-06-26 DIAGNOSIS — K21.9 GASTRO-ESOPHAGEAL REFLUX DISEASE W/OUT ESOPHAGITIS: ICD-10-CM

## 2024-06-26 DIAGNOSIS — C92.10 CHRONIC MYELOID LEUKEMIA, BCR/ABL-POSITIVE, NOT HAVING ACHIEVED REMISSION: ICD-10-CM

## 2024-06-26 DIAGNOSIS — E78.5 HYPERLIPIDEMIA, UNSPECIFIED: ICD-10-CM

## 2024-06-26 DIAGNOSIS — M54.16 RADICULOPATHY, LUMBAR REGION: ICD-10-CM

## 2024-06-26 PROCEDURE — G2211 COMPLEX E/M VISIT ADD ON: CPT

## 2024-06-26 PROCEDURE — 36415 COLL VENOUS BLD VENIPUNCTURE: CPT

## 2024-06-26 PROCEDURE — 99214 OFFICE O/P EST MOD 30 MIN: CPT

## 2024-06-27 LAB
ALBUMIN SERPL ELPH-MCNC: 4.1 G/DL
ALP BLD-CCNC: 72 U/L
ALT SERPL-CCNC: 14 U/L
ANION GAP SERPL CALC-SCNC: 13 MMOL/L
AST SERPL-CCNC: 24 U/L
BASOPHILS # BLD AUTO: 0.04 K/UL
BASOPHILS NFR BLD AUTO: 0.8 %
BILIRUB SERPL-MCNC: 0.5 MG/DL
BUN SERPL-MCNC: 36 MG/DL
CALCIUM SERPL-MCNC: 9.3 MG/DL
CHLORIDE SERPL-SCNC: 105 MMOL/L
CHOLEST SERPL-MCNC: 136 MG/DL
CK SERPL-CCNC: 128 U/L
CO2 SERPL-SCNC: 23 MMOL/L
CREAT SERPL-MCNC: 1.28 MG/DL
EGFR: 45 ML/MIN/1.73M2
EOSINOPHIL # BLD AUTO: 0.25 K/UL
EOSINOPHIL NFR BLD AUTO: 5.3 %
GLUCOSE SERPL-MCNC: 91 MG/DL
HCT VFR BLD CALC: 33 %
HDLC SERPL-MCNC: 66 MG/DL
HGB BLD-MCNC: 9.8 G/DL
IMM GRANULOCYTES NFR BLD AUTO: 0.2 %
LDLC SERPL CALC-MCNC: 58 MG/DL
LYMPHOCYTES # BLD AUTO: 1.57 K/UL
LYMPHOCYTES NFR BLD AUTO: 33.1 %
MAN DIFF?: NORMAL
MCHC RBC-ENTMCNC: 29.7 GM/DL
MCHC RBC-ENTMCNC: 30.1 PG
MCV RBC AUTO: 101.2 FL
MONOCYTES # BLD AUTO: 0.45 K/UL
MONOCYTES NFR BLD AUTO: 9.5 %
NEUTROPHILS # BLD AUTO: 2.43 K/UL
NEUTROPHILS NFR BLD AUTO: 51.1 %
NONHDLC SERPL-MCNC: 70 MG/DL
PLATELET # BLD AUTO: 184 K/UL
POTASSIUM SERPL-SCNC: 4.7 MMOL/L
PROT SERPL-MCNC: 6.9 G/DL
RBC # BLD: 3.26 M/UL
RBC # FLD: 14.6 %
SODIUM SERPL-SCNC: 141 MMOL/L
T3FREE SERPL-MCNC: 2.16 PG/ML
T4 FREE SERPL-MCNC: 1.3 NG/DL
TRIGL SERPL-MCNC: 57 MG/DL
TSH SERPL-ACNC: 1.86 UIU/ML
URATE SERPL-MCNC: 4.2 MG/DL
WBC # FLD AUTO: 4.75 K/UL

## 2024-08-12 ENCOUNTER — RX RENEWAL (OUTPATIENT)
Age: 74
End: 2024-08-12

## 2024-09-05 ENCOUNTER — APPOINTMENT (OUTPATIENT)
Dept: FAMILY MEDICINE | Facility: CLINIC | Age: 74
End: 2024-09-05
Payer: MEDICARE

## 2024-09-05 VITALS
BODY MASS INDEX: 24.54 KG/M2 | WEIGHT: 125 LBS | HEIGHT: 60 IN | TEMPERATURE: 97.3 F | HEART RATE: 75 BPM | SYSTOLIC BLOOD PRESSURE: 118 MMHG | OXYGEN SATURATION: 98 % | DIASTOLIC BLOOD PRESSURE: 60 MMHG | RESPIRATION RATE: 17 BRPM

## 2024-09-05 DIAGNOSIS — E78.5 HYPERLIPIDEMIA, UNSPECIFIED: ICD-10-CM

## 2024-09-05 DIAGNOSIS — C92.10 CHRONIC MYELOID LEUKEMIA, BCR/ABL-POSITIVE, NOT HAVING ACHIEVED REMISSION: ICD-10-CM

## 2024-09-05 DIAGNOSIS — I10 ESSENTIAL (PRIMARY) HYPERTENSION: ICD-10-CM

## 2024-09-05 DIAGNOSIS — E03.9 HYPOTHYROIDISM, UNSPECIFIED: ICD-10-CM

## 2024-09-05 DIAGNOSIS — R10.9 UNSPECIFIED ABDOMINAL PAIN: ICD-10-CM

## 2024-09-05 DIAGNOSIS — G62.9 POLYNEUROPATHY, UNSPECIFIED: ICD-10-CM

## 2024-09-05 DIAGNOSIS — K21.9 GASTRO-ESOPHAGEAL REFLUX DISEASE W/OUT ESOPHAGITIS: ICD-10-CM

## 2024-09-05 PROCEDURE — G2211 COMPLEX E/M VISIT ADD ON: CPT

## 2024-09-05 PROCEDURE — 99214 OFFICE O/P EST MOD 30 MIN: CPT

## 2024-09-05 RX ORDER — FAMOTIDINE 20 MG/1
20 TABLET, FILM COATED ORAL
Qty: 90 | Refills: 1 | Status: ACTIVE | COMMUNITY
Start: 2024-09-05 | End: 1900-01-01

## 2024-09-05 NOTE — HEALTH RISK ASSESSMENT
[No] : In the past 12 months have you used drugs other than those required for medical reasons? No [No falls in past year] : Patient reported no falls in the past year [Little interest or pleasure doing things] : 1) Little interest or pleasure doing things [Feeling down, depressed, or hopeless] : 2) Feeling down, depressed, or hopeless [0] : 2) Feeling down, depressed, or hopeless: Not at all (0) [PHQ-2 Negative - No further assessment needed] : PHQ-2 Negative - No further assessment needed [Former] : Former [5-9] : 5-9 [> 15 Years] : > 15 Years [Audit-CScore] : 0 [[id number]] : 0

## 2024-09-05 NOTE — ASSESSMENT
[FreeTextEntry1] : Assessment and plan:  1.  CML presently patient is doing extremely well seen by hematology oncology and patient will continue to [employer] 150 mg 2 capsules twice a day.  2.  Essential hypertension excellent blood pressure control patient is on low-dose enalapril 2.5 mg.  3.  Occasionally patient does have fluid retention she does have furosemide available.  4.  Gastroesophageal reflux patient feels that she is having breakthrough with abdominal burning therefore she will continue pantoprazole in a.m. and I added famotidine 20 mg at bedtime she will notify me if that works for her if not we need her to be reevaluated by gastroenterology and possibly do a repeat upper endoscopy.  5.  Hyperuricemia well-controlled with allopurinol patient takes 300 mg daily.  6.  Hyperlipidemia patient does follow a low-fat low-cholesterol diet and does continue lovastatin 40 mg at bedtime.  7.  Hypothyroidism I will be checking free T3 free T4 and TSH continue levothyroxine 125 mcg.  8.  Peripheral neuropathy continue gabapentin 400 mg twice a day.  9.  Total time spent face-to-face and non-face-to-face time 35 minutes the majority of which was spent on counseling and coordination of care.

## 2024-09-05 NOTE — HISTORY OF PRESENT ILLNESS
[FreeTextEntry1] : Dyspepsia. [de-identified] : Patient is 74-year-old female presents today for follow-up and disease management chief complaint today abdominal pain which patient states that she was taken OTC medication and the pain was well-controlled she ran out of it and now does not remember the exact name.  Patient has been seen on a regular basis by gastroenterology she does have a history of chronic reflux she should be on proton pump inhibitor.  Medical history significant for acid reflux, CML, hypertension, hyperlipidemia, hypothyroidism and peripheral neuropathy.

## 2024-09-05 NOTE — PHYSICAL EXAM
[No Acute Distress] : no acute distress [Well Nourished] : well nourished [Well Developed] : well developed [Normal Voice/Communication] : normal voice/communication [Ill-Appearing] : ill-appearing [Normal Sclera/Conjunctiva] : normal sclera/conjunctiva [PERRL] : pupils equal round and reactive to light [No JVD] : no jugular venous distention [No Lymphadenopathy] : no lymphadenopathy [Supple] : supple [Normal Rate] : normal rate  [Regular Rhythm] : with a regular rhythm [Normal S1, S2] : normal S1 and S2 [No Carotid Bruits] : no carotid bruits [No Abdominal Bruit] : a ~M bruit was not heard ~T in the abdomen [No Varicosities] : no varicosities [Pedal Pulses Present] : the pedal pulses are present [No Edema] : there was no peripheral edema [No Palpable Aorta] : no palpable aorta [No Extremity Clubbing/Cyanosis] : no extremity clubbing/cyanosis [Soft] : abdomen soft [Non Tender] : non-tender [Non-distended] : non-distended [No Masses] : no abdominal mass palpated [Normal Bowel Sounds] : normal bowel sounds [Coordination Grossly Intact] : coordination grossly intact [No Focal Deficits] : no focal deficits [Normal Gait] : normal gait [Deep Tendon Reflexes (DTR)] : deep tendon reflexes were 2+ and symmetric [Speech Grossly Normal] : speech grossly normal [Memory Grossly Normal] : memory grossly normal [Normal Affect] : the affect was normal [Alert and Oriented x3] : oriented to person, place, and time [Normal Mood] : the mood was normal [Normal Insight/Judgement] : insight and judgment were intact [Normal] : affect was normal and insight and judgment were intact [de-identified] : Epigastric pain occasional physical exam unremarkable today [de-identified] : Left knee joint effusion [de-identified] : Back pain has improved

## 2024-09-05 NOTE — REVIEW OF SYSTEMS
[Abdominal Pain] : abdominal pain [Heartburn] : heartburn [Joint Pain] : joint pain [Joint Stiffness] : joint stiffness [Muscle Pain] : muscle pain [Back Pain] : back pain [Dizziness] : dizziness [Negative] : Heme/Lymph [Chest Pain] : no chest pain [Palpitations] : no palpitations [Leg Claudication] : no leg claudication [Lower Ext Edema] : no lower extremity edema [Orthopnea] : no orthopnea [Nausea] : no nausea [Constipation] : no constipation [Diarrhea] : diarrhea [Vomiting] : no vomiting [Melena] : no melena [Joint Swelling] : no joint swelling [Muscle Weakness] : no muscle weakness [Itching] : no itching [Mole Changes] : no mole changes [Nail Changes] : no nail changes [Hair Changes] : no hair changes [Skin Rash] : no skin rash [Headache] : no headache [Fainting] : no fainting [Confusion] : no confusion [Memory Loss] : no memory loss [Unsteady Walking] : no ataxia [de-identified] :  pruritic scalp resolved

## 2024-09-12 ENCOUNTER — RX RENEWAL (OUTPATIENT)
Age: 74
End: 2024-09-12

## 2024-10-02 ENCOUNTER — NON-APPOINTMENT (OUTPATIENT)
Age: 74
End: 2024-10-02

## 2024-10-02 ENCOUNTER — APPOINTMENT (OUTPATIENT)
Dept: CARDIOLOGY | Facility: CLINIC | Age: 74
End: 2024-10-02
Payer: MEDICARE

## 2024-10-02 VITALS — HEART RATE: 60 BPM | SYSTOLIC BLOOD PRESSURE: 110 MMHG | DIASTOLIC BLOOD PRESSURE: 60 MMHG

## 2024-10-02 VITALS — HEIGHT: 60 IN | OXYGEN SATURATION: 96 % | WEIGHT: 128 LBS | BODY MASS INDEX: 25.13 KG/M2 | HEART RATE: 95 BPM

## 2024-10-02 DIAGNOSIS — Z95.5 PRESENCE OF CORONARY ANGIOPLASTY IMPLANT AND GRAFT: ICD-10-CM

## 2024-10-02 DIAGNOSIS — I25.10 ATHEROSCLEROTIC HEART DISEASE OF NATIVE CORONARY ARTERY W/OUT ANGINA PECTORIS: ICD-10-CM

## 2024-10-02 DIAGNOSIS — E78.5 HYPERLIPIDEMIA, UNSPECIFIED: ICD-10-CM

## 2024-10-02 PROCEDURE — 93000 ELECTROCARDIOGRAM COMPLETE: CPT

## 2024-10-02 PROCEDURE — 99214 OFFICE O/P EST MOD 30 MIN: CPT

## 2024-10-02 PROCEDURE — G2211 COMPLEX E/M VISIT ADD ON: CPT

## 2024-10-02 NOTE — ASSESSMENT
[FreeTextEntry1] :  pression: 1.  Coronary artery disease status post stenting currently doing well Numeral 2.  Dyslipidemia tolerating increased dose of atorvastatin started several months ago LDL at goal in June  Plan: 1.  For now continue current medical regimen 2.  Will send for [facility] records 3.  Return for follow-up in 4 months time depending on clinical course at some point could consider repeat coronary disease evaluation

## 2024-10-02 NOTE — REASON FOR VISIT
[Hyperlipidemia] : hyperlipidemia [Coronary Artery Disease] : coronary artery disease [FreeTextEntry1] : Patient returns for follow-up.  She is doing fairly well from a cardiac perspective.  She states she sometimes gets a little short of breath but she does not think it is exertion related.  She is always concerned over the status of her stents.  Of note is the fact that she was recently hospitalized at [facility] apparently for renal failure that she states she was told was due to dehydration.  We do not have these records at this time.  She states she is still on enalapril.  She believes she is still on furosemide as well.  Of note is the fact that her last coronary disease evaluation was in February 2022 when she underwent catheterization that revealed patent stents.

## 2024-10-15 ENCOUNTER — RX RENEWAL (OUTPATIENT)
Age: 74
End: 2024-10-15

## 2024-10-15 ENCOUNTER — APPOINTMENT (OUTPATIENT)
Dept: FAMILY MEDICINE | Facility: CLINIC | Age: 74
End: 2024-10-15
Payer: MEDICARE

## 2024-10-15 VITALS
HEIGHT: 60 IN | OXYGEN SATURATION: 95 % | DIASTOLIC BLOOD PRESSURE: 62 MMHG | RESPIRATION RATE: 18 BRPM | BODY MASS INDEX: 24.35 KG/M2 | WEIGHT: 124 LBS | HEART RATE: 79 BPM | SYSTOLIC BLOOD PRESSURE: 102 MMHG | TEMPERATURE: 97.5 F

## 2024-10-15 DIAGNOSIS — I25.10 ATHEROSCLEROTIC HEART DISEASE OF NATIVE CORONARY ARTERY W/OUT ANGINA PECTORIS: ICD-10-CM

## 2024-10-15 DIAGNOSIS — C92.10 CHRONIC MYELOID LEUKEMIA, BCR/ABL-POSITIVE, NOT HAVING ACHIEVED REMISSION: ICD-10-CM

## 2024-10-15 DIAGNOSIS — N17.9 ACUTE KIDNEY FAILURE, UNSPECIFIED: ICD-10-CM

## 2024-10-15 DIAGNOSIS — N18.30 CHRONIC KIDNEY DISEASE, STAGE 3 UNSPECIFIED: ICD-10-CM

## 2024-10-15 PROCEDURE — 90662 IIV NO PRSV INCREASED AG IM: CPT

## 2024-10-15 PROCEDURE — G0008: CPT

## 2024-10-15 PROCEDURE — 99215 OFFICE O/P EST HI 40 MIN: CPT

## 2024-10-16 LAB
ALBUMIN SERPL ELPH-MCNC: 3.7 G/DL
ALP BLD-CCNC: 106 U/L
ALT SERPL-CCNC: 13 U/L
ANION GAP SERPL CALC-SCNC: 13 MMOL/L
AST SERPL-CCNC: 17 U/L
BASOPHILS # BLD AUTO: 0.03 K/UL
BASOPHILS NFR BLD AUTO: 0.5 %
BILIRUB SERPL-MCNC: 0.4 MG/DL
BUN SERPL-MCNC: 24 MG/DL
CALCIUM SERPL-MCNC: 8.4 MG/DL
CHLORIDE SERPL-SCNC: 107 MMOL/L
CO2 SERPL-SCNC: 22 MMOL/L
CREAT SERPL-MCNC: 1.07 MG/DL
CREAT SPEC-SCNC: 72 MG/DL
EGFR: 55 ML/MIN/1.73M2
EOSINOPHIL # BLD AUTO: 0.13 K/UL
EOSINOPHIL NFR BLD AUTO: 2.3 %
GLUCOSE SERPL-MCNC: 135 MG/DL
HCT VFR BLD CALC: 26.8 %
HGB BLD-MCNC: 8.3 G/DL
IMM GRANULOCYTES NFR BLD AUTO: 0.2 %
LYMPHOCYTES # BLD AUTO: 1.24 K/UL
LYMPHOCYTES NFR BLD AUTO: 21.8 %
MAN DIFF?: NORMAL
MCHC RBC-ENTMCNC: 30.3 PG
MCHC RBC-ENTMCNC: 31 GM/DL
MCV RBC AUTO: 97.8 FL
MICROALBUMIN 24H UR DL<=1MG/L-MCNC: <1.2 MG/DL
MICROALBUMIN/CREAT 24H UR-RTO: NORMAL MG/G
MONOCYTES # BLD AUTO: 0.51 K/UL
MONOCYTES NFR BLD AUTO: 8.9 %
NEUTROPHILS # BLD AUTO: 3.78 K/UL
NEUTROPHILS NFR BLD AUTO: 66.3 %
PLATELET # BLD AUTO: 165 K/UL
POTASSIUM SERPL-SCNC: 5.1 MMOL/L
PROT SERPL-MCNC: 6.4 G/DL
RBC # BLD: 2.74 M/UL
RBC # FLD: 14.6 %
SODIUM SERPL-SCNC: 142 MMOL/L
WBC # FLD AUTO: 5.7 K/UL

## 2024-10-21 ENCOUNTER — RX RENEWAL (OUTPATIENT)
Age: 74
End: 2024-10-21

## 2024-11-11 ENCOUNTER — RX RENEWAL (OUTPATIENT)
Age: 74
End: 2024-11-11

## 2024-12-18 ENCOUNTER — APPOINTMENT (OUTPATIENT)
Dept: FAMILY MEDICINE | Facility: CLINIC | Age: 74
End: 2024-12-18
Payer: MEDICARE

## 2024-12-18 VITALS
DIASTOLIC BLOOD PRESSURE: 68 MMHG | OXYGEN SATURATION: 95 % | HEART RATE: 60 BPM | TEMPERATURE: 97.9 F | SYSTOLIC BLOOD PRESSURE: 131 MMHG | RESPIRATION RATE: 16 BRPM | HEIGHT: 60 IN | BODY MASS INDEX: 24.54 KG/M2 | WEIGHT: 125 LBS

## 2024-12-18 DIAGNOSIS — E78.5 HYPERLIPIDEMIA, UNSPECIFIED: ICD-10-CM

## 2024-12-18 DIAGNOSIS — C92.10 CHRONIC MYELOID LEUKEMIA, BCR/ABL-POSITIVE, NOT HAVING ACHIEVED REMISSION: ICD-10-CM

## 2024-12-18 DIAGNOSIS — M10.9 GOUT, UNSPECIFIED: ICD-10-CM

## 2024-12-18 DIAGNOSIS — Z95.5 PRESENCE OF CORONARY ANGIOPLASTY IMPLANT AND GRAFT: ICD-10-CM

## 2024-12-18 DIAGNOSIS — E66.9 OBESITY, UNSPECIFIED: ICD-10-CM

## 2024-12-18 DIAGNOSIS — G62.9 POLYNEUROPATHY, UNSPECIFIED: ICD-10-CM

## 2024-12-18 DIAGNOSIS — E03.9 HYPOTHYROIDISM, UNSPECIFIED: ICD-10-CM

## 2024-12-18 DIAGNOSIS — I25.10 ATHEROSCLEROTIC HEART DISEASE OF NATIVE CORONARY ARTERY W/OUT ANGINA PECTORIS: ICD-10-CM

## 2024-12-18 DIAGNOSIS — K21.9 GASTRO-ESOPHAGEAL REFLUX DISEASE W/OUT ESOPHAGITIS: ICD-10-CM

## 2024-12-18 DIAGNOSIS — I10 ESSENTIAL (PRIMARY) HYPERTENSION: ICD-10-CM

## 2024-12-18 PROCEDURE — 99214 OFFICE O/P EST MOD 30 MIN: CPT

## 2024-12-18 PROCEDURE — 36415 COLL VENOUS BLD VENIPUNCTURE: CPT

## 2024-12-18 PROCEDURE — G2211 COMPLEX E/M VISIT ADD ON: CPT

## 2024-12-19 LAB
ALBUMIN SERPL ELPH-MCNC: 3.8 G/DL
ALP BLD-CCNC: 87 U/L
ALT SERPL-CCNC: 11 U/L
ANION GAP SERPL CALC-SCNC: 12 MMOL/L
AST SERPL-CCNC: 23 U/L
BASOPHILS # BLD AUTO: 0.03 K/UL
BASOPHILS NFR BLD AUTO: 0.7 %
BILIRUB SERPL-MCNC: 0.4 MG/DL
BUN SERPL-MCNC: 19 MG/DL
CALCIUM SERPL-MCNC: 9 MG/DL
CHLORIDE SERPL-SCNC: 109 MMOL/L
CHOLEST SERPL-MCNC: 127 MG/DL
CO2 SERPL-SCNC: 23 MMOL/L
CREAT SERPL-MCNC: 1.03 MG/DL
EGFR: 58 ML/MIN/1.73M2
EOSINOPHIL # BLD AUTO: 0.35 K/UL
EOSINOPHIL NFR BLD AUTO: 7.9 %
FERRITIN SERPL-MCNC: 54 NG/ML
FOLATE SERPL-MCNC: 14.7 NG/ML
GLUCOSE SERPL-MCNC: 87 MG/DL
HCT VFR BLD CALC: 29.2 %
HDLC SERPL-MCNC: 63 MG/DL
HGB BLD-MCNC: 8.7 G/DL
IMM GRANULOCYTES NFR BLD AUTO: 0.2 %
IRON SATN MFR SERPL: 15 %
IRON SERPL-MCNC: 43 UG/DL
LDLC SERPL CALC-MCNC: 52 MG/DL
LYMPHOCYTES # BLD AUTO: 1.84 K/UL
LYMPHOCYTES NFR BLD AUTO: 41.3 %
MAN DIFF?: NORMAL
MCHC RBC-ENTMCNC: 29.1 PG
MCHC RBC-ENTMCNC: 29.8 G/DL
MCV RBC AUTO: 97.7 FL
MONOCYTES # BLD AUTO: 0.38 K/UL
MONOCYTES NFR BLD AUTO: 8.5 %
NEUTROPHILS # BLD AUTO: 1.84 K/UL
NEUTROPHILS NFR BLD AUTO: 41.4 %
NONHDLC SERPL-MCNC: 63 MG/DL
PLATELET # BLD AUTO: 158 K/UL
POTASSIUM SERPL-SCNC: 4.9 MMOL/L
PROT SERPL-MCNC: 7 G/DL
RBC # BLD: 2.99 M/UL
RBC # FLD: 14.6 %
SODIUM SERPL-SCNC: 143 MMOL/L
T3FREE SERPL-MCNC: 2.1 PG/ML
T4 FREE SERPL-MCNC: 1.2 NG/DL
TIBC SERPL-MCNC: 281 UG/DL
TRIGL SERPL-MCNC: 49 MG/DL
TSH SERPL-ACNC: 1.67 UIU/ML
UIBC SERPL-MCNC: 238 UG/DL
URATE SERPL-MCNC: 3.8 MG/DL
VIT B12 SERPL-MCNC: 412 PG/ML
WBC # FLD AUTO: 4.45 K/UL

## 2024-12-31 ENCOUNTER — RX RENEWAL (OUTPATIENT)
Age: 74
End: 2024-12-31

## 2025-01-13 ENCOUNTER — RX RENEWAL (OUTPATIENT)
Age: 75
End: 2025-01-13

## 2025-01-27 ENCOUNTER — RX RENEWAL (OUTPATIENT)
Age: 75
End: 2025-01-27

## 2025-01-29 NOTE — ED ADULT NURSE NOTE - ALCOHOL PRE SCREEN (AUDIT - C)
[facility] called per Naomy Underwood, Nursing Supervisor preceptee, to arrange BLS transport to [facility]. Requested information provided. ETA [phone]-- DAISY Lewis RN made aware.   
Statement Selected

## 2025-02-03 ENCOUNTER — APPOINTMENT (OUTPATIENT)
Dept: CARDIOLOGY | Facility: CLINIC | Age: 75
End: 2025-02-03
Payer: MEDICARE

## 2025-02-03 ENCOUNTER — NON-APPOINTMENT (OUTPATIENT)
Age: 75
End: 2025-02-03

## 2025-02-03 VITALS — SYSTOLIC BLOOD PRESSURE: 122 MMHG | HEART RATE: 64 BPM | DIASTOLIC BLOOD PRESSURE: 60 MMHG

## 2025-02-03 DIAGNOSIS — I25.10 ATHEROSCLEROTIC HEART DISEASE OF NATIVE CORONARY ARTERY W/OUT ANGINA PECTORIS: ICD-10-CM

## 2025-02-03 DIAGNOSIS — I10 ESSENTIAL (PRIMARY) HYPERTENSION: ICD-10-CM

## 2025-02-03 DIAGNOSIS — E78.5 HYPERLIPIDEMIA, UNSPECIFIED: ICD-10-CM

## 2025-02-03 PROCEDURE — 93000 ELECTROCARDIOGRAM COMPLETE: CPT

## 2025-02-03 PROCEDURE — G2211 COMPLEX E/M VISIT ADD ON: CPT

## 2025-02-03 PROCEDURE — 99214 OFFICE O/P EST MOD 30 MIN: CPT

## 2025-02-05 ENCOUNTER — RX RENEWAL (OUTPATIENT)
Age: 75
End: 2025-02-05

## 2025-02-05 RX ORDER — LEVOTHYROXINE SODIUM 0.11 MG/1
112 TABLET ORAL
Qty: 90 | Refills: 3 | Status: ACTIVE | COMMUNITY
Start: 2025-02-05 | End: 1900-01-01

## 2025-02-27 NOTE — CARDIOLOGY SUMMARY
Bed: B-04  Expected date: 12/22/20  Expected time:   Means of arrival:   Comments:  1740 Sylvie Case, RN  12/22/20 [id number]
Infusion Nursing Note:  Avis COLE MunozHumberto presents today for Port labs.    Patient seen by provider today: No   present during visit today: Not Applicable.    Note: N/A.    Intravenous Access:  Implanted Port.    Treatment Conditions:  Not Applicable.    Post Infusion Assessment:  Blood return noted.  Site patent and intact, free from redness, edema or discomfort.  No evidence of extravasations.  Access discontinued per protocol.     Discharge Plan:   Discharge instructions reviewed with: Patient.  Patient and/or family verbalized understanding of discharge instructions and all questions answered.  AVS to patient via [employer].  Patient will return 3/26/2025 for appt with Dr. Gill for next appointment.   Patient discharged in stable condition accompanied by: self.  Departure Mode: Ambulatory.    Louise Sanders RN    
[___] : [unfilled]

## 2025-04-07 ENCOUNTER — RX RENEWAL (OUTPATIENT)
Age: 75
End: 2025-04-07

## 2025-04-09 ENCOUNTER — APPOINTMENT (OUTPATIENT)
Dept: FAMILY MEDICINE | Facility: CLINIC | Age: 75
End: 2025-04-09
Payer: MEDICARE

## 2025-04-09 VITALS
HEIGHT: 60 IN | SYSTOLIC BLOOD PRESSURE: 101 MMHG | HEART RATE: 67 BPM | TEMPERATURE: 97.5 F | DIASTOLIC BLOOD PRESSURE: 60 MMHG | BODY MASS INDEX: 27.48 KG/M2 | RESPIRATION RATE: 16 BRPM | OXYGEN SATURATION: 96 % | WEIGHT: 140 LBS

## 2025-04-09 DIAGNOSIS — G89.29 DORSALGIA, UNSPECIFIED: ICD-10-CM

## 2025-04-09 DIAGNOSIS — M54.9 DORSALGIA, UNSPECIFIED: ICD-10-CM

## 2025-04-09 DIAGNOSIS — I10 ESSENTIAL (PRIMARY) HYPERTENSION: ICD-10-CM

## 2025-04-09 DIAGNOSIS — E03.9 HYPOTHYROIDISM, UNSPECIFIED: ICD-10-CM

## 2025-04-09 DIAGNOSIS — E78.5 HYPERLIPIDEMIA, UNSPECIFIED: ICD-10-CM

## 2025-04-09 DIAGNOSIS — G62.9 POLYNEUROPATHY, UNSPECIFIED: ICD-10-CM

## 2025-04-09 DIAGNOSIS — C92.10 CHRONIC MYELOID LEUKEMIA, BCR/ABL-POSITIVE, NOT HAVING ACHIEVED REMISSION: ICD-10-CM

## 2025-04-09 DIAGNOSIS — M10.9 GOUT, UNSPECIFIED: ICD-10-CM

## 2025-04-09 DIAGNOSIS — K21.9 GASTRO-ESOPHAGEAL REFLUX DISEASE W/OUT ESOPHAGITIS: ICD-10-CM

## 2025-04-09 DIAGNOSIS — N18.30 CHRONIC KIDNEY DISEASE, STAGE 3 UNSPECIFIED: ICD-10-CM

## 2025-04-09 PROCEDURE — 99214 OFFICE O/P EST MOD 30 MIN: CPT

## 2025-04-09 PROCEDURE — G2211 COMPLEX E/M VISIT ADD ON: CPT

## 2025-04-16 NOTE — ED PROVIDER NOTE - CHIEF COMPLAINT
Procedure: colonoscopy in 2026 (January to April anytime)  Diagnosis: UC history   Prep (if applicable): Suprep split dose      Preapproval for Entyvio subcutaneous q2w   The patient is a 71y Female complaining of chest pain.

## 2025-04-22 ENCOUNTER — OUTPATIENT (OUTPATIENT)
Dept: OUTPATIENT SERVICES | Facility: HOSPITAL | Age: 75
LOS: 1 days | End: 2025-04-22
Payer: MEDICARE

## 2025-04-22 ENCOUNTER — APPOINTMENT (OUTPATIENT)
Dept: RADIOLOGY | Facility: HOSPITAL | Age: 75
End: 2025-04-22

## 2025-04-22 DIAGNOSIS — Z98.89 OTHER SPECIFIED POSTPROCEDURAL STATES: Chronic | ICD-10-CM

## 2025-04-22 DIAGNOSIS — Z98.890 OTHER SPECIFIED POSTPROCEDURAL STATES: Chronic | ICD-10-CM

## 2025-04-22 DIAGNOSIS — Z96.653 PRESENCE OF ARTIFICIAL KNEE JOINT, BILATERAL: Chronic | ICD-10-CM

## 2025-04-22 DIAGNOSIS — K46.9 UNSPECIFIED ABDOMINAL HERNIA WITHOUT OBSTRUCTION OR GANGRENE: Chronic | ICD-10-CM

## 2025-04-22 DIAGNOSIS — Z00.8 ENCOUNTER FOR OTHER GENERAL EXAMINATION: ICD-10-CM

## 2025-04-22 PROCEDURE — 73562 X-RAY EXAM OF KNEE 3: CPT | Mod: 26,LT

## 2025-04-22 PROCEDURE — 73562 X-RAY EXAM OF KNEE 3: CPT

## 2025-04-29 ENCOUNTER — RX RENEWAL (OUTPATIENT)
Age: 75
End: 2025-04-29

## 2025-05-08 DIAGNOSIS — J06.9 ACUTE UPPER RESPIRATORY INFECTION, UNSPECIFIED: ICD-10-CM

## 2025-05-14 ENCOUNTER — RX RENEWAL (OUTPATIENT)
Age: 75
End: 2025-05-14

## 2025-06-03 ENCOUNTER — APPOINTMENT (OUTPATIENT)
Dept: CARDIOLOGY | Facility: CLINIC | Age: 75
End: 2025-06-03
Payer: MEDICARE

## 2025-06-03 ENCOUNTER — NON-APPOINTMENT (OUTPATIENT)
Age: 75
End: 2025-06-03

## 2025-06-03 VITALS — HEART RATE: 60 BPM | SYSTOLIC BLOOD PRESSURE: 140 MMHG | DIASTOLIC BLOOD PRESSURE: 60 MMHG

## 2025-06-03 VITALS
HEIGHT: 60 IN | BODY MASS INDEX: 27.48 KG/M2 | HEART RATE: 73 BPM | RESPIRATION RATE: 18 BRPM | OXYGEN SATURATION: 95 % | WEIGHT: 140 LBS | SYSTOLIC BLOOD PRESSURE: 121 MMHG | DIASTOLIC BLOOD PRESSURE: 67 MMHG

## 2025-06-03 DIAGNOSIS — M79.603 PAIN IN ARM, UNSPECIFIED: ICD-10-CM

## 2025-06-03 DIAGNOSIS — I25.10 ATHEROSCLEROTIC HEART DISEASE OF NATIVE CORONARY ARTERY W/OUT ANGINA PECTORIS: ICD-10-CM

## 2025-06-03 PROCEDURE — G2211 COMPLEX E/M VISIT ADD ON: CPT

## 2025-06-03 PROCEDURE — 93000 ELECTROCARDIOGRAM COMPLETE: CPT

## 2025-06-03 PROCEDURE — 99214 OFFICE O/P EST MOD 30 MIN: CPT

## 2025-07-02 ENCOUNTER — APPOINTMENT (OUTPATIENT)
Dept: FAMILY MEDICINE | Facility: CLINIC | Age: 75
End: 2025-07-02
Payer: MEDICARE

## 2025-07-02 VITALS
SYSTOLIC BLOOD PRESSURE: 134 MMHG | HEIGHT: 60 IN | WEIGHT: 140 LBS | BODY MASS INDEX: 27.48 KG/M2 | OXYGEN SATURATION: 96 % | DIASTOLIC BLOOD PRESSURE: 68 MMHG | HEART RATE: 64 BPM

## 2025-07-02 PROCEDURE — 99215 OFFICE O/P EST HI 40 MIN: CPT

## 2025-07-02 PROCEDURE — 36415 COLL VENOUS BLD VENIPUNCTURE: CPT

## 2025-07-03 LAB
ALBUMIN SERPL ELPH-MCNC: 3.9 G/DL
ALP BLD-CCNC: 93 U/L
ALT SERPL-CCNC: 19 U/L
ANION GAP SERPL CALC-SCNC: 12 MMOL/L
AST SERPL-CCNC: 25 U/L
BILIRUB SERPL-MCNC: 0.4 MG/DL
BUN SERPL-MCNC: 30 MG/DL
CALCIUM SERPL-MCNC: 9.2 MG/DL
CHLORIDE SERPL-SCNC: 111 MMOL/L
CHOLEST SERPL-MCNC: 136 MG/DL
CO2 SERPL-SCNC: 20 MMOL/L
CREAT SERPL-MCNC: 1.11 MG/DL
EGFRCR SERPLBLD CKD-EPI 2021: 52 ML/MIN/1.73M2
FERRITIN SERPL-MCNC: 27 NG/ML
FOLATE SERPL-MCNC: 13 NG/ML
GLUCOSE SERPL-MCNC: 96 MG/DL
HCT VFR BLD CALC: 31.1 %
HDLC SERPL-MCNC: 58 MG/DL
HGB BLD-MCNC: 9.2 G/DL
IRON SATN MFR SERPL: 12 %
IRON SERPL-MCNC: 40 UG/DL
LDLC SERPL-MCNC: 67 MG/DL
MCHC RBC-ENTMCNC: 28.5 PG
MCHC RBC-ENTMCNC: 29.6 G/DL
MCV RBC AUTO: 96.3 FL
NONHDLC SERPL-MCNC: 78 MG/DL
PLATELET # BLD AUTO: 171 K/UL
POTASSIUM SERPL-SCNC: 5 MMOL/L
PROT SERPL-MCNC: 6.9 G/DL
RBC # BLD: 3.23 M/UL
RBC # FLD: 14.6 %
SODIUM SERPL-SCNC: 143 MMOL/L
T3FREE SERPL-MCNC: 2.41 PG/ML
T4 FREE SERPL-MCNC: 1.7 NG/DL
TIBC SERPL-MCNC: 338 UG/DL
TRIGL SERPL-MCNC: 46 MG/DL
TSH SERPL-ACNC: 0.12 UIU/ML
UIBC SERPL-MCNC: 298 UG/DL
URATE SERPL-MCNC: 4 MG/DL
VIT B12 SERPL-MCNC: 400 PG/ML
WBC # FLD AUTO: 3.8 K/UL

## 2025-07-08 PROBLEM — R79.89 ABNORMAL TSH: Status: ACTIVE | Noted: 2025-07-08

## 2025-07-25 ENCOUNTER — LABORATORY RESULT (OUTPATIENT)
Age: 75
End: 2025-07-25

## 2025-07-25 NOTE — ASU DISCHARGE PLAN (ADULT/PEDIATRIC) - MEDICATION INSTRUCTIONS
take Tylenol as needed for pain [Joint Pain] : joint pain [Joint Stiffness] : joint stiffness [Joint Swelling] : no joint swelling [Negative] : Heme/Lymph

## 2025-07-30 RX ORDER — LEVOTHYROXINE SODIUM 0.1 MG/1
100 TABLET ORAL DAILY
Qty: 90 | Refills: 3 | Status: ACTIVE | COMMUNITY
Start: 2025-07-29

## 2025-08-22 RX ORDER — AZITHROMYCIN 250 MG/1
250 TABLET, FILM COATED ORAL
Qty: 1 | Refills: 0 | Status: ACTIVE | COMMUNITY
Start: 2025-08-22 | End: 1900-01-01